# Patient Record
Sex: FEMALE | NOT HISPANIC OR LATINO | Employment: OTHER | URBAN - METROPOLITAN AREA
[De-identification: names, ages, dates, MRNs, and addresses within clinical notes are randomized per-mention and may not be internally consistent; named-entity substitution may affect disease eponyms.]

---

## 2023-09-01 PROBLEM — I10 ESSENTIAL HYPERTENSION: Status: ACTIVE | Noted: 2023-09-01

## 2023-09-01 PROBLEM — R60.0 EDEMA OF LEFT LOWER EXTREMITY: Status: ACTIVE | Noted: 2023-09-01

## 2023-09-01 PROBLEM — D72.829 LEUKOCYTOSIS: Status: ACTIVE | Noted: 2023-09-01

## 2023-09-01 PROBLEM — E78.5 HYPERLIPIDEMIA: Status: ACTIVE | Noted: 2023-09-01

## 2023-09-01 PROBLEM — E11.9 TYPE 2 DIABETES MELLITUS WITHOUT COMPLICATIONS (MULTI): Status: ACTIVE | Noted: 2023-09-01

## 2023-09-01 PROBLEM — C85.90 LYMPHOMA (MULTI): Status: ACTIVE | Noted: 2023-09-01

## 2023-09-01 RX ORDER — DOXYCYCLINE 100 MG/1
CAPSULE ORAL
COMMUNITY
Start: 2017-06-04 | End: 2023-11-30 | Stop reason: ALTCHOICE

## 2023-09-01 RX ORDER — METFORMIN HYDROCHLORIDE 500 MG/1
1 TABLET ORAL 2 TIMES DAILY
COMMUNITY
Start: 2017-09-19 | End: 2023-11-30 | Stop reason: DRUGHIGH

## 2023-09-01 RX ORDER — SULFAMETHOXAZOLE AND TRIMETHOPRIM 800; 160 MG/1; MG/1
1 TABLET ORAL 2 TIMES DAILY
COMMUNITY
Start: 2017-06-06 | End: 2023-10-20 | Stop reason: ALTCHOICE

## 2023-09-01 RX ORDER — CHOLECALCIFEROL (VITAMIN D3) 25 MCG
1 TABLET ORAL
COMMUNITY

## 2023-09-01 RX ORDER — LOSARTAN POTASSIUM 25 MG/1
1 TABLET ORAL DAILY
COMMUNITY
End: 2023-10-20

## 2023-09-01 RX ORDER — HYDROCORTISONE 1 %
CREAM (GRAM) TOPICAL
COMMUNITY
Start: 2017-06-06 | End: 2023-11-30 | Stop reason: WASHOUT

## 2023-09-01 RX ORDER — METFORMIN HYDROCHLORIDE 500 MG/1
TABLET ORAL NIGHTLY
COMMUNITY
End: 2023-11-30 | Stop reason: DRUGHIGH

## 2023-09-01 RX ORDER — VIT C/E/ZN/COPPR/LUTEIN/ZEAXAN 250MG-90MG
1 CAPSULE ORAL DAILY
COMMUNITY
End: 2023-11-30 | Stop reason: ALTCHOICE

## 2023-09-01 RX ORDER — AMLODIPINE BESYLATE 10 MG/1
1 TABLET ORAL DAILY
COMMUNITY
End: 2023-10-05

## 2023-09-01 RX ORDER — FLUTICASONE PROPIONATE 50 MCG
1 SPRAY, SUSPENSION (ML) NASAL DAILY
COMMUNITY
Start: 2023-05-30 | End: 2023-11-30 | Stop reason: ALTCHOICE

## 2023-09-01 RX ORDER — METFORMIN HYDROCHLORIDE 500 MG/1
3 TABLET ORAL DAILY
COMMUNITY
End: 2023-11-30 | Stop reason: DRUGHIGH

## 2023-09-01 RX ORDER — ASPIRIN 81 MG/1
1 TABLET ORAL DAILY
COMMUNITY
Start: 2016-11-09

## 2023-09-01 RX ORDER — ATENOLOL 50 MG/1
1 TABLET ORAL DAILY
COMMUNITY
End: 2023-10-05

## 2023-09-01 RX ORDER — GUAIFENESIN 1200 MG
TABLET, EXTENDED RELEASE 12 HR ORAL AS NEEDED
COMMUNITY
Start: 2016-11-09

## 2023-10-20 DIAGNOSIS — I10 PRIMARY HYPERTENSION: ICD-10-CM

## 2023-10-20 RX ORDER — LOSARTAN POTASSIUM 25 MG/1
25 TABLET ORAL DAILY
Qty: 90 TABLET | Refills: 3 | Status: SHIPPED | OUTPATIENT
Start: 2023-10-20 | End: 2024-02-19 | Stop reason: SDUPTHER

## 2023-11-21 ENCOUNTER — OFFICE VISIT (OUTPATIENT)
Dept: HEMATOLOGY/ONCOLOGY | Facility: CLINIC | Age: 88
End: 2023-11-21
Payer: MEDICARE

## 2023-11-21 VITALS
SYSTOLIC BLOOD PRESSURE: 163 MMHG | HEIGHT: 60 IN | DIASTOLIC BLOOD PRESSURE: 82 MMHG | OXYGEN SATURATION: 93 % | RESPIRATION RATE: 16 BRPM | HEART RATE: 67 BPM | TEMPERATURE: 97 F | WEIGHT: 159.39 LBS | BODY MASS INDEX: 31.29 KG/M2

## 2023-11-21 DIAGNOSIS — C83.30 DLBCL (DIFFUSE LARGE B CELL LYMPHOMA) (MULTI): ICD-10-CM

## 2023-11-21 LAB
BASOPHILS # BLD AUTO: 0.07 X10*3/UL (ref 0–0.1)
BASOPHILS NFR BLD AUTO: 0.6 %
EOSINOPHIL # BLD AUTO: 0.14 X10*3/UL (ref 0–0.4)
EOSINOPHIL NFR BLD AUTO: 1.3 %
ERYTHROCYTE [DISTWIDTH] IN BLOOD BY AUTOMATED COUNT: 13 % (ref 11.5–14.5)
HCT VFR BLD AUTO: 40.8 % (ref 36–46)
HGB BLD-MCNC: 13.3 G/DL (ref 12–16)
IMM GRANULOCYTES # BLD AUTO: 0.03 X10*3/UL (ref 0–0.5)
IMM GRANULOCYTES NFR BLD AUTO: 0.3 % (ref 0–0.9)
LYMPHOCYTES # BLD AUTO: 2.55 X10*3/UL (ref 0.8–3)
LYMPHOCYTES NFR BLD AUTO: 23.3 %
MCH RBC QN AUTO: 32.2 PG (ref 26–34)
MCHC RBC AUTO-ENTMCNC: 32.6 G/DL (ref 32–36)
MCV RBC AUTO: 99 FL (ref 80–100)
MONOCYTES # BLD AUTO: 0.97 X10*3/UL (ref 0.05–0.8)
MONOCYTES NFR BLD AUTO: 8.9 %
NEUTROPHILS # BLD AUTO: 7.18 X10*3/UL (ref 1.6–5.5)
NEUTROPHILS NFR BLD AUTO: 65.6 %
PLATELET # BLD AUTO: 424 X10*3/UL (ref 150–450)
RBC # BLD AUTO: 4.13 X10*6/UL (ref 4–5.2)
WBC # BLD AUTO: 10.9 X10*3/UL (ref 4.4–11.3)

## 2023-11-21 PROCEDURE — 1126F AMNT PAIN NOTED NONE PRSNT: CPT | Performed by: INTERNAL MEDICINE

## 2023-11-21 PROCEDURE — 85025 COMPLETE CBC W/AUTO DIFF WBC: CPT | Performed by: INTERNAL MEDICINE

## 2023-11-21 PROCEDURE — 1159F MED LIST DOCD IN RCRD: CPT | Performed by: INTERNAL MEDICINE

## 2023-11-21 PROCEDURE — 99213 OFFICE O/P EST LOW 20 MIN: CPT | Performed by: INTERNAL MEDICINE

## 2023-11-21 PROCEDURE — 3079F DIAST BP 80-89 MM HG: CPT | Performed by: INTERNAL MEDICINE

## 2023-11-21 PROCEDURE — 36415 COLL VENOUS BLD VENIPUNCTURE: CPT | Performed by: INTERNAL MEDICINE

## 2023-11-21 PROCEDURE — 3077F SYST BP >= 140 MM HG: CPT | Performed by: INTERNAL MEDICINE

## 2023-11-21 ASSESSMENT — PATIENT HEALTH QUESTIONNAIRE - PHQ9
1. LITTLE INTEREST OR PLEASURE IN DOING THINGS: NOT AT ALL
8. MOVING OR SPEAKING SO SLOWLY THAT OTHER PEOPLE COULD HAVE NOTICED. OR THE OPPOSITE, BEING SO FIGETY OR RESTLESS THAT YOU HAVE BEEN MOVING AROUND A LOT MORE THAN USUAL: NOT AT ALL
3. TROUBLE FALLING OR STAYING ASLEEP OR SLEEPING TOO MUCH: NOT AT ALL
3. TROUBLE FALLING OR STAYING ASLEEP OR SLEEPING TOO MUCH: NOT AT ALL
2. FEELING DOWN, DEPRESSED OR HOPELESS: NOT AT ALL
7. TROUBLE CONCENTRATING ON THINGS, SUCH AS READING THE NEWSPAPER OR WATCHING TELEVISION: NOT AT ALL
4. FEELING TIRED OR HAVING LITTLE ENERGY: NOT AT ALL
2. FEELING DOWN, DEPRESSED, IRRITABLE, OR HOPELESS: 0
SUM OF ALL RESPONSES TO PHQ QUESTIONS 1-9: 0
4. FEELING TIRED OR HAVING LITTLE ENERGY: 0
3. TROUBLE FALLING OR STAYING ASLEEP OR SLEEPING TOO MUCH: 0
7. TROUBLE CONCENTRATING ON THINGS, SUCH AS READING THE NEWSPAPER OR WATCHING TELEVISION: NOT AT ALL
10. IF YOU CHECKED OFF ANY PROBLEMS, HOW DIFFICULT HAVE THESE PROBLEMS MADE IT FOR YOU TO DO YOUR WORK, TAKE CARE OF THINGS AT HOME, OR GET ALONG WITH OTHER PEOPLE: NOT DIFFICULT AT ALL
1. LITTLE INTEREST OR PLEASURE IN DOING THINGS: NOT AT ALL
8. MOVING OR SPEAKING SO SLOWLY THAT OTHER PEOPLE COULD HAVE NOTICED. OR THE OPPOSITE, BEING SO FIGETY OR RESTLESS THAT YOU HAVE BEEN MOVING AROUND A LOT MORE THAN USUAL: NOT AT ALL
6. FEELING BAD ABOUT YOURSELF - OR THAT YOU ARE A FAILURE OR HAVE LET YOURSELF OR YOUR FAMILY DOWN: NOT AT ALL
9. THOUGHTS THAT YOU WOULD BE BETTER OFF DEAD, OR OF HURTING YOURSELF: 0
2. FEELING DOWN, DEPRESSED, IRRITABLE, OR HOPELESS: NOT AT ALL
6. FEELING BAD ABOUT YOURSELF - OR THAT YOU ARE A FAILURE OR HAVE LET YOURSELF OR YOUR FAMILY DOWN: NOT AT ALL
5. POOR APPETITE OR OVEREATING: NOT AT ALL
10. IF YOU CHECKED OFF ANY PROBLEMS, HOW DIFFICULT HAVE THESE PROBLEMS MADE IT FOR YOU TO DO YOUR WORK, TAKE CARE OF THINGS AT HOME, OR GET ALONG WITH OTHER PEOPLE: NOT DIFFICULT AT ALL
9. THOUGHTS THAT YOU WOULD BE BETTER OFF DEAD, OR OF HURTING YOURSELF: NOT AT ALL
1. LITTLE INTEREST OR PLEASURE IN DOING THINGS: 0
5. POOR APPETITE OR OVEREATING: NOT AT ALL
6. FEELING BAD ABOUT YOURSELF - OR THAT YOU ARE A FAILURE OR HAVE LET YOURSELF OR YOUR FAMILY DOWN: 0
4. FEELING TIRED OR HAVING LITTLE ENERGY: NOT AT ALL
SUM OF ALL RESPONSES TO PHQ QUESTIONS 1-9: 0
8. MOVING OR SPEAKING SO SLOWLY THAT OTHER PEOPLE COULD HAVE NOTICED. OR THE OPPOSITE, BEING SO FIGETY OR RESTLESS THAT YOU HAVE BEEN MOVING AROUND A LOT MORE THAN USUAL: 0
5. POOR APPETITE OR OVEREATING: 0
7. TROUBLE CONCENTRATING ON THINGS, SUCH AS READING THE NEWSPAPER OR WATCHING TELEVISION: 0
9. THOUGHTS THAT YOU WOULD BE BETTER OFF DEAD, OR OF HURTING YOURSELF: NOT AT ALL

## 2023-11-21 ASSESSMENT — PAIN SCALES - GENERAL: PAINLEVEL: 0-NO PAIN

## 2023-11-21 ASSESSMENT — ENCOUNTER SYMPTOMS
OCCASIONAL FEELINGS OF UNSTEADINESS: 0
DEPRESSION: 0
LOSS OF SENSATION IN FEET: 0

## 2023-11-21 NOTE — PATIENT INSTRUCTIONS
Today you met with Dr. Means and he has ordered a CT with a follow up appt afterward.  And...if all is good, you will be able to see your PCP going forward.

## 2023-11-21 NOTE — PROGRESS NOTES
"Patient ID: Soni Herrera is a 88 y.o. female.  Referring Physician: No referring provider defined for this encounter.  Primary Care Provider: MEGHANN Alcocer      Subjective    HPI  Treatment Synopsis:    s/p mini-R-CHOP x 6 and s/p splenectomy Sep 2014 for DLBCL      History of Present Illness:      Interval History:    Patient presents for follow up. On assessment she is doing well. Appetite and energy are good and she remains active. Denies fever, chills, night sweats, anorexia,  weight loss, CP, SOB, abd pain, N/V/D/C, bleeding, and any other complaints at this time.  Review of Systems   Constitutional: Negative.    HENT:  Negative.     Eyes: Negative.    Respiratory: Negative.     Cardiovascular: Negative.    Gastrointestinal: Negative.    Genitourinary: Negative.          Objective   BSA: 1.75 meters squared  /82 (BP Location: Left arm, Patient Position: Sitting, BP Cuff Size: Adult)   Pulse 67   Temp 36.1 °C (97 °F) (Temporal)   Resp 16   Ht 1.53 m (5' 0.24\")   Wt 72.3 kg (159 lb 6.3 oz)   SpO2 93%   BMI 30.89 kg/m²     Family History   Problem Relation Name Age of Onset    Heart disease Mother      Hypertension Mother      Prostate cancer Son          last june     Oncology History    No history exists.       Soni Herrera  has no history on file for tobacco use.  She  has no history on file for alcohol use.  She  has no history on file for drug use.    Physical Exam  Constitutional:       Appearance: Normal appearance.   HENT:      Head: Normocephalic and atraumatic.      Right Ear: External ear normal.      Left Ear: External ear normal.      Nose: Nose normal.      Mouth/Throat:      Mouth: Mucous membranes are moist.   Eyes:      Extraocular Movements: Extraocular movements intact.      Pupils: Pupils are equal, round, and reactive to light.   Pulmonary:      Effort: Pulmonary effort is normal.      Breath sounds: Normal breath sounds.   Abdominal:      General: Abdomen is flat.     "  Palpations: Abdomen is soft.   Musculoskeletal:         General: Normal range of motion.      Cervical back: Normal range of motion and neck supple.   Neurological:      General: No focal deficit present.      Mental Status: She is oriented to person, place, and time.         Performance Status:  Symptomatic; fully ambulatory    Assessment/Plan    CT CAP : RTC 8 weeks.  Patient with a history of lymphoma treated with Rituxan CHOP x 6 and splenectomy in 2014.  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  CT chest abdomen and pelvis prescribed.  If negative patient will be discharged from oncologic surveillance.    Diagnoses and all orders for this visit:  DLBCL (diffuse large B cell lymphoma) (CMS/HCC)  -     CBC and Auto Differential; Future  -     CT chest abdomen pelvis w IV contrast; Future  -     Clinic Appointment Request Follow Up; Future             Keith-Ez Means MD

## 2023-11-22 ASSESSMENT — ENCOUNTER SYMPTOMS
CARDIOVASCULAR NEGATIVE: 1
GASTROINTESTINAL NEGATIVE: 1
RESPIRATORY NEGATIVE: 1
EYES NEGATIVE: 1
CONSTITUTIONAL NEGATIVE: 1

## 2023-11-30 ENCOUNTER — OFFICE VISIT (OUTPATIENT)
Dept: PRIMARY CARE | Facility: CLINIC | Age: 88
End: 2023-11-30
Payer: MEDICARE

## 2023-11-30 VITALS
OXYGEN SATURATION: 93 % | WEIGHT: 160 LBS | DIASTOLIC BLOOD PRESSURE: 68 MMHG | HEIGHT: 62 IN | TEMPERATURE: 96.7 F | HEART RATE: 68 BPM | BODY MASS INDEX: 29.44 KG/M2 | SYSTOLIC BLOOD PRESSURE: 136 MMHG

## 2023-11-30 DIAGNOSIS — I10 ESSENTIAL HYPERTENSION: ICD-10-CM

## 2023-11-30 DIAGNOSIS — E78.2 MIXED HYPERLIPIDEMIA: ICD-10-CM

## 2023-11-30 DIAGNOSIS — N18.31 STAGE 3A CHRONIC KIDNEY DISEASE (MULTI): Primary | ICD-10-CM

## 2023-11-30 DIAGNOSIS — E11.9 TYPE 2 DIABETES MELLITUS WITHOUT COMPLICATION, WITHOUT LONG-TERM CURRENT USE OF INSULIN (MULTI): ICD-10-CM

## 2023-11-30 PROBLEM — R60.0 EDEMA OF LEFT LOWER EXTREMITY: Status: RESOLVED | Noted: 2023-09-01 | Resolved: 2023-11-30

## 2023-11-30 LAB — POC HEMOGLOBIN A1C: 7.2 % (ref 4.2–6.5)

## 2023-11-30 PROCEDURE — 83036 HEMOGLOBIN GLYCOSYLATED A1C: CPT | Performed by: NURSE PRACTITIONER

## 2023-11-30 PROCEDURE — 3078F DIAST BP <80 MM HG: CPT | Performed by: NURSE PRACTITIONER

## 2023-11-30 PROCEDURE — 1159F MED LIST DOCD IN RCRD: CPT | Performed by: NURSE PRACTITIONER

## 2023-11-30 PROCEDURE — 1036F TOBACCO NON-USER: CPT | Performed by: NURSE PRACTITIONER

## 2023-11-30 PROCEDURE — 1160F RVW MEDS BY RX/DR IN RCRD: CPT | Performed by: NURSE PRACTITIONER

## 2023-11-30 PROCEDURE — 1126F AMNT PAIN NOTED NONE PRSNT: CPT | Performed by: NURSE PRACTITIONER

## 2023-11-30 PROCEDURE — 99214 OFFICE O/P EST MOD 30 MIN: CPT | Performed by: NURSE PRACTITIONER

## 2023-11-30 PROCEDURE — 3075F SYST BP GE 130 - 139MM HG: CPT | Performed by: NURSE PRACTITIONER

## 2023-11-30 RX ORDER — METFORMIN HYDROCHLORIDE 500 MG/1
1000 TABLET ORAL
COMMUNITY

## 2023-11-30 ASSESSMENT — ENCOUNTER SYMPTOMS
ABDOMINAL PAIN: 0
SHORTNESS OF BREATH: 0
PHOTOPHOBIA: 0
HEMATURIA: 0
BACK PAIN: 0
BLOOD IN STOOL: 0
BRUISES/BLEEDS EASILY: 0
TREMORS: 0
ACTIVITY CHANGE: 0
PALPITATIONS: 0
COUGH: 0
SINUS PAIN: 0
ARTHRALGIAS: 0
NERVOUS/ANXIOUS: 0
CONSTIPATION: 0
ADENOPATHY: 0
DEPRESSION: 0
HEADACHES: 0
WHEEZING: 0
AGITATION: 0
WEAKNESS: 0
SORE THROAT: 0
DIZZINESS: 0
LOSS OF SENSATION IN FEET: 0
EYE DISCHARGE: 0
OCCASIONAL FEELINGS OF UNSTEADINESS: 0
JOINT SWELLING: 0
APPETITE CHANGE: 0
DYSURIA: 0
DIARRHEA: 0

## 2023-11-30 ASSESSMENT — PATIENT HEALTH QUESTIONNAIRE - PHQ9
SUM OF ALL RESPONSES TO PHQ9 QUESTIONS 1 AND 2: 0
1. LITTLE INTEREST OR PLEASURE IN DOING THINGS: NOT AT ALL
2. FEELING DOWN, DEPRESSED OR HOPELESS: NOT AT ALL

## 2023-11-30 ASSESSMENT — PAIN SCALES - GENERAL: PAINLEVEL: 0-NO PAIN

## 2023-11-30 NOTE — PROGRESS NOTES
Subjective   Patient ID: Soni Herrera is a 88 y.o. female who presents for Diabetes.    Presents today for follow-up on diabetes and hypertension.  She denies new concerns.  She brings with her today her daughter-in-law who helps with history and recent events.  She notes that her  passed away in August of this year.  He was sick for approximately 2 weeks before he passed away.  She states that she is living independently in her home.  Her children do check on her intermittently.  She is able to manage her meals and her ADLs and IADLs.  Hemoglobin A1c in office today is 7.2 which is improved from 7.4 in July.  She denies any periods of low blood sugars.    Diabetes  She presents for her follow-up diabetic visit. She has type 2 diabetes mellitus. Pertinent negatives for hypoglycemia include no dizziness, headaches, nervousness/anxiousness or tremors. Pertinent negatives for diabetes include no chest pain, no polyuria and no weakness. There are no hypoglycemic complications. Risk factors for coronary artery disease include dyslipidemia, diabetes mellitus, sedentary lifestyle and family history. Current diabetic treatment includes oral agent (monotherapy). She is compliant with treatment all of the time. She is following a diabetic and generally healthy diet. Meal planning includes avoidance of concentrated sweets. She rarely participates in exercise. There is no change in her home blood glucose trend. An ACE inhibitor/angiotensin II receptor blocker is being taken. She sees a podiatrist.       Review of Systems   Constitutional:  Negative for activity change and appetite change.   HENT:  Negative for congestion, ear pain, hearing loss, sinus pain and sore throat.    Eyes:  Negative for photophobia, discharge and visual disturbance.   Respiratory:  Negative for cough, shortness of breath and wheezing.    Cardiovascular:  Negative for chest pain, palpitations and leg swelling.   Gastrointestinal:  Negative for  "abdominal pain, blood in stool, constipation and diarrhea.   Endocrine: Negative for cold intolerance, heat intolerance and polyuria.   Genitourinary:  Negative for dysuria and hematuria.   Musculoskeletal:  Negative for arthralgias, back pain and joint swelling.   Skin:  Negative for rash.   Allergic/Immunologic: Negative for environmental allergies and food allergies.   Neurological:  Negative for dizziness, tremors, syncope, weakness and headaches.   Hematological:  Negative for adenopathy. Does not bruise/bleed easily.   Psychiatric/Behavioral:  Negative for agitation and suicidal ideas. The patient is not nervous/anxious.        Objective   /68   Pulse 68   Temp 35.9 °C (96.7 °F) (Temporal)   Ht 1.575 m (5' 2\")   Wt 72.6 kg (160 lb)   SpO2 93%   BMI 29.26 kg/m²     Physical Exam  Constitutional:       General: She is not in acute distress.     Appearance: Normal appearance.   HENT:      Head: Normocephalic.      Right Ear: Tympanic membrane normal.      Left Ear: Tympanic membrane normal.      Nose: Nose normal.      Mouth/Throat:      Mouth: Mucous membranes are moist.   Eyes:      Conjunctiva/sclera: Conjunctivae normal.      Pupils: Pupils are equal, round, and reactive to light.   Cardiovascular:      Rate and Rhythm: Normal rate and regular rhythm.      Pulses:           Dorsalis pedis pulses are 1+ on the right side and 1+ on the left side.      Heart sounds: Normal heart sounds.   Pulmonary:      Effort: Pulmonary effort is normal.      Breath sounds: Normal breath sounds.   Abdominal:      General: Bowel sounds are normal.      Palpations: Abdomen is soft.   Musculoskeletal:         General: Normal range of motion.   Feet:      Right foot:      Protective Sensation: 2 sites tested.  2 sites sensed.      Skin integrity: Skin integrity normal.      Left foot:      Protective Sensation: 2 sites tested.  2 sites sensed.      Skin integrity: Skin integrity normal.   Skin:     General: Skin is " warm and dry.      Capillary Refill: Capillary refill takes less than 2 seconds.   Neurological:      Mental Status: She is alert and oriented to person, place, and time.   Psychiatric:         Mood and Affect: Mood normal.         Speech: Speech normal.         Assessment/Plan   Problem List Items Addressed This Visit             ICD-10-CM    Type 2 diabetes mellitus without complications (CMS/HCC) E11.9    Essential hypertension I10    Hyperlipidemia E78.5    Stage 3a chronic kidney disease (CMS/[facility]) - Primary N18.31     Focus on healthy eating including lean proteins and vegetables.  Avoid high carbohydrate high sugar foods and drinks.  Try to increase physical activity as tolerated.  Goal is for 160 minutes/week of physical activity.  Check blood pressure 2-3 times a week.  Call for blood pressures greater than 140/90.  Bring list of blood pressures to next visit.  Hemoglobin A1C is 7.2 today improved from 7.3 in May.

## 2023-11-30 NOTE — PATIENT INSTRUCTIONS
Focus on healthy eating including lean proteins and vegetables.  Avoid high carbohydrate high sugar foods and drinks.  Try to increase physical activity as tolerated.  Goal is for 160 minutes/week of physical activity.  Check blood pressure 2-3 times a week.  Call for blood pressures greater than 140/90.  Bring list of blood pressures to next visit.  Hemoglobin A1C is 7.2 today improved from 7.3 in May.

## 2024-01-16 ENCOUNTER — APPOINTMENT (OUTPATIENT)
Dept: RADIOLOGY | Facility: HOSPITAL | Age: 89
End: 2024-01-16
Payer: MEDICARE

## 2024-01-22 ENCOUNTER — APPOINTMENT (OUTPATIENT)
Dept: HEMATOLOGY/ONCOLOGY | Facility: CLINIC | Age: 89
End: 2024-01-22
Payer: MEDICARE

## 2024-01-23 ENCOUNTER — APPOINTMENT (OUTPATIENT)
Dept: HEMATOLOGY/ONCOLOGY | Facility: CLINIC | Age: 89
End: 2024-01-23
Payer: MEDICARE

## 2024-01-23 DIAGNOSIS — I10 ESSENTIAL HYPERTENSION: ICD-10-CM

## 2024-01-23 RX ORDER — ATENOLOL 50 MG/1
50 TABLET ORAL DAILY
Qty: 90 TABLET | Refills: 3 | Status: SHIPPED | OUTPATIENT
Start: 2024-01-23 | End: 2024-01-24 | Stop reason: SDUPTHER

## 2024-01-24 DIAGNOSIS — I10 ESSENTIAL HYPERTENSION: ICD-10-CM

## 2024-01-24 RX ORDER — ATENOLOL 50 MG/1
50 TABLET ORAL DAILY
Qty: 90 TABLET | Refills: 3 | Status: SHIPPED | OUTPATIENT
Start: 2024-01-24 | End: 2025-01-23

## 2024-02-13 ENCOUNTER — HOSPITAL ENCOUNTER (OUTPATIENT)
Dept: RADIOLOGY | Facility: HOSPITAL | Age: 89
Discharge: HOME | End: 2024-02-13
Payer: MEDICARE

## 2024-02-13 DIAGNOSIS — C83.30 DLBCL (DIFFUSE LARGE B CELL LYMPHOMA) (MULTI): ICD-10-CM

## 2024-02-13 LAB
CREAT SERPL-MCNC: 0.8 MG/DL (ref 0.6–1.3)
GFR SERPL CREATININE-BSD FRML MDRD: 71 ML/MIN/1.73M*2

## 2024-02-13 PROCEDURE — 71260 CT THORAX DX C+: CPT | Performed by: RADIOLOGY

## 2024-02-13 PROCEDURE — 2550000001 HC RX 255 CONTRASTS: Performed by: INTERNAL MEDICINE

## 2024-02-13 PROCEDURE — 82565 ASSAY OF CREATININE: CPT

## 2024-02-13 PROCEDURE — 74177 CT ABD & PELVIS W/CONTRAST: CPT | Performed by: RADIOLOGY

## 2024-02-13 PROCEDURE — 74177 CT ABD & PELVIS W/CONTRAST: CPT

## 2024-02-13 RX ADMIN — IOHEXOL 75 ML: 350 INJECTION, SOLUTION INTRAVENOUS at 12:02

## 2024-02-16 ENCOUNTER — TELEPHONE (OUTPATIENT)
Dept: HEMATOLOGY/ONCOLOGY | Facility: CLINIC | Age: 89
End: 2024-02-16
Payer: MEDICARE

## 2024-02-16 ENCOUNTER — OFFICE VISIT (OUTPATIENT)
Dept: HEMATOLOGY/ONCOLOGY | Facility: CLINIC | Age: 89
End: 2024-02-16
Payer: MEDICARE

## 2024-02-16 VITALS
HEART RATE: 76 BPM | DIASTOLIC BLOOD PRESSURE: 69 MMHG | OXYGEN SATURATION: 96 % | WEIGHT: 160.6 LBS | BODY MASS INDEX: 29.38 KG/M2 | RESPIRATION RATE: 17 BRPM | TEMPERATURE: 96.3 F | SYSTOLIC BLOOD PRESSURE: 147 MMHG

## 2024-02-16 DIAGNOSIS — C83.30 DLBCL (DIFFUSE LARGE B CELL LYMPHOMA) (MULTI): ICD-10-CM

## 2024-02-16 DIAGNOSIS — I10 ESSENTIAL HYPERTENSION: ICD-10-CM

## 2024-02-16 PROCEDURE — 1159F MED LIST DOCD IN RCRD: CPT | Performed by: INTERNAL MEDICINE

## 2024-02-16 PROCEDURE — 99214 OFFICE O/P EST MOD 30 MIN: CPT | Performed by: INTERNAL MEDICINE

## 2024-02-16 PROCEDURE — 1036F TOBACCO NON-USER: CPT | Performed by: INTERNAL MEDICINE

## 2024-02-16 PROCEDURE — 3078F DIAST BP <80 MM HG: CPT | Performed by: INTERNAL MEDICINE

## 2024-02-16 PROCEDURE — 3077F SYST BP >= 140 MM HG: CPT | Performed by: INTERNAL MEDICINE

## 2024-02-16 PROCEDURE — 1126F AMNT PAIN NOTED NONE PRSNT: CPT | Performed by: INTERNAL MEDICINE

## 2024-02-16 PROCEDURE — 1157F ADVNC CARE PLAN IN RCRD: CPT | Performed by: INTERNAL MEDICINE

## 2024-02-16 RX ORDER — AMLODIPINE BESYLATE 10 MG/1
10 TABLET ORAL DAILY
Qty: 90 TABLET | Refills: 3 | Status: SHIPPED | OUTPATIENT
Start: 2024-02-16 | End: 2024-02-19 | Stop reason: SDUPTHER

## 2024-02-16 ASSESSMENT — PAIN SCALES - GENERAL: PAINLEVEL: 0-NO PAIN

## 2024-02-16 NOTE — PROGRESS NOTES
Patient ID: Soni Herrera is a 88 y.o. female.  Referring Physician: Josie Means MD  [address] [place] [address]  Primary Care Provider: MEGHANN Alcocer  Visit Type:  Follow Up       Subjective    HPI  Treatment Synopsis:    s/p mini-R-CHOP x 6 and s/p splenectomy Sep 2014 for DLBCL      History of Present Illness:      Interval History:    Patient presents for follow up. On assessment she is doing well. Appetite and energy are good and she remains active. Denies fever, chills, night sweats, anorexia,  weight loss, CP, SOB, abd pain, N/V/D/C, bleeding, and any other complaints at this time.  Review of Systems - Oncology     Objective   BSA: 1.78 meters squared  /69 (BP Location: Left arm, Patient Position: Sitting, BP Cuff Size: Adult)   Pulse 76   Temp 35.7 °C (96.3 °F) (Temporal)   Resp 17   Wt 72.8 kg (160 lb 9.7 oz)   SpO2 96%   BMI 29.38 kg/m²      has a past medical history of Left upper quadrant pain (04/03/2014), Pallor (04/03/2014), Personal history of other specified conditions (03/13/2014), and Personal history of other specified conditions (04/03/2014).   has a past surgical history that includes Total abdominal hysterectomy w/ bilateral salpingoophorectomy (03/13/2014) and Tonsillectomy (03/13/2014).  Family History   Problem Relation Name Age of Onset    Heart disease Mother      Hypertension Mother      Prostate cancer Son          last june     Oncology History    No history exists.       Soni Herrera  reports that she has never smoked. She has never used smokeless tobacco.  She  reports no history of alcohol use.  She  reports no history of drug use.    Physical Exam    WBC   Date/Time Value Ref Range Status   11/21/2023 11:07 AM 10.9 4.4 - 11.3 x10*3/uL Final   05/30/2023 01:50 PM 12.4 (H) 4.5 - 11.0 K/UL Final   11/22/2022 10:49 AM 12.4 (H) 4.4 - 11.3 x10E9/L Final   11/23/2021 01:49 PM 10.9 4.4 - 11.3 x10E9/L Final     nRBC   Date Value  Ref Range Status   05/30/2023 0 0 /100 WBC Final   05/11/2021 0 0 /100 WBC Final   11/16/2020 0 0 /100 WBC Final     RBC   Date Value Ref Range Status   11/21/2023 4.13 4.00 - 5.20 x10*6/uL Final   05/30/2023 4.06 4.0 - 4.9 M/UL Final   11/22/2022 3.95 (L) 4.00 - 5.20 x10E12/L Final   11/23/2021 3.91 (L) 4.00 - 5.20 x10E12/L Final     Hemoglobin   Date Value Ref Range Status   11/21/2023 13.3 12.0 - 16.0 g/dL Final   05/30/2023 12.5 12.0 - 15.0 GM/DL Final   11/22/2022 12.8 12.0 - 16.0 g/dL Final   11/23/2021 12.6 12.0 - 16.0 g/dL Final     Hematocrit   Date Value Ref Range Status   11/21/2023 40.8 36.0 - 46.0 % Final   05/30/2023 38.6 36 - 44 % Final   11/22/2022 40.0 36.0 - 46.0 % Final   11/23/2021 38.4 36.0 - 46.0 % Final     MCV   Date/Time Value Ref Range Status   11/21/2023 11:07 AM 99 80 - 100 fL Final   05/30/2023 01:50 PM 95.1 80 - 100 FL Final   11/22/2022 10:49  (H) 80 - 100 fL Final   11/23/2021 01:49 PM 98 80 - 100 fL Final     MCH   Date/Time Value Ref Range Status   11/21/2023 11:07 AM 32.2 26.0 - 34.0 pg Final   05/30/2023 01:50 PM 30.8 26 - 34 PG Final   05/11/2021 03:30 PM 31.6 26 - 34 PG Final     MCHC   Date/Time Value Ref Range Status   11/21/2023 11:07 AM 32.6 32.0 - 36.0 g/dL Final   05/30/2023 01:50 PM 32.4 31 - 37 % Final   11/22/2022 10:49 AM 32.0 32.0 - 36.0 g/dL Final   11/23/2021 01:49 PM 32.8 32.0 - 36.0 g/dL Final     RDW   Date/Time Value Ref Range Status   11/21/2023 11:07 AM 13.0 11.5 - 14.5 % Final   11/22/2022 10:49 AM 13.2 11.5 - 14.5 % Final   11/23/2021 01:49 PM 13.3 11.5 - 14.5 % Final     Platelets   Date/Time Value Ref Range Status   11/21/2023 11:07  150 - 450 x10*3/uL Final   05/30/2023 01:50  (H) 150 - 450 K/UL Final   11/22/2022 10:49  150 - 450 x10E9/L Final   11/23/2021 01:49  150 - 450 x10E9/L Final     MPV   Date/Time Value Ref Range Status   05/30/2023 01:50 PM 10.0 7.0 - 12.6 CU Final   05/11/2021 03:30 PM 10.0 7.0 - 12.6 CU Final    11/16/2020 09:50 AM 10.4 7.0 - 12.6 CU Final     Neutrophils %   Date/Time Value Ref Range Status   11/21/2023 11:07 AM 65.6 40.0 - 80.0 % Final   11/22/2022 10:49 AM 64.4 40.0 - 80.0 % Final   11/23/2021 01:49 PM 55.3 40.0 - 80.0 % Final     Immature Granulocytes %, Automated   Date/Time Value Ref Range Status   11/21/2023 11:07 AM 0.3 0.0 - 0.9 % Final     Comment:     Immature Granulocyte Count (IG) includes promyelocytes, myelocytes and metamyelocytes but does not include bands. Percent differential counts (%) should be interpreted in the context of the absolute cell counts (cells/UL).   11/22/2022 10:49 AM 0.2 0.0 - 0.9 % Final     Comment:      Immature Granulocyte Count (IG) includes promyelocytes,    myelocytes and metamyelocytes but does not include bands.   Percent differential counts (%) should be interpreted in the   context of the absolute cell counts (cells/L).     11/23/2021 01:49 PM 0.2 0.0 - 0.9 % Final     Comment:      Immature Granulocyte Count (IG) includes promyelocytes,    myelocytes and metamyelocytes but does not include bands.   Percent differential counts (%) should be interpreted in the   context of the absolute cell counts (cells/L).       Lymphocytes %   Date/Time Value Ref Range Status   11/21/2023 11:07 AM 23.3 13.0 - 44.0 % Final   05/30/2023 01:50 PM 20.10 20 - 40 % Final   11/22/2022 10:49 AM 23.9 13.0 - 44.0 % Final     Comment:      Percent differential counts (%) should be interpreted in the   context of the absolute cell counts (cells/L).     11/23/2021 01:49 PM 31.8 13.0 - 44.0 % Final     Comment:      Percent differential counts (%) should be interpreted in the   context of the absolute cell counts (cells/L).     05/11/2021 03:30 PM 19.30 (L) 20 - 40 % Final   11/16/2020 09:50 AM 21.80 20 - 40 % Final     Monocytes %   Date/Time Value Ref Range Status   11/21/2023 11:07 AM 8.9 2.0 - 10.0 % Final   05/30/2023 01:50 PM 7.70 0 - 8 % Final   11/22/2022 10:49 AM 8.7 2.0 - 10.0  % Final   11/23/2021 01:49 PM 10.5 2.0 - 10.0 % Final   05/11/2021 03:30 PM 8.00 0 - 8 % Final   11/16/2020 09:50 AM 8.20 (H) 0 - 8 % Final     Eosinophils %   Date/Time Value Ref Range Status   11/21/2023 11:07 AM 1.3 0.0 - 6.0 % Final   11/22/2022 10:49 AM 2.2 0.0 - 6.0 % Final   11/23/2021 01:49 PM 1.6 0.0 - 6.0 % Final     Basophils %   Date/Time Value Ref Range Status   11/21/2023 11:07 AM 0.6 0.0 - 2.0 % Final   05/30/2023 01:50 PM 0.60 0 - 1 % Final   11/22/2022 10:49 AM 0.6 0.0 - 2.0 % Final   11/23/2021 01:49 PM 0.6 0.0 - 2.0 % Final     Neutrophils Absolute   Date/Time Value Ref Range Status   11/21/2023 11:07 AM 7.18 (H) 1.60 - 5.50 x10*3/uL Final     Comment:     Percent differential counts (%) should be interpreted in the context of the absolute cell counts (cells/uL).   05/30/2023 01:50 PM 8.68 (H) 1.8 - 7.7 K/UL Final   11/22/2022 10:49 AM 7.99 (H) 1.60 - 5.50 x10E9/L Final   11/23/2021 01:49 PM 6.03 (H) 1.60 - 5.50 x10E9/L Final     Immature Granulocytes Absolute, Automated   Date/Time Value Ref Range Status   11/21/2023 11:07 AM 0.03 0.00 - 0.50 x10*3/uL Final   05/30/2023 01:50 PM 0.05 0.0 - 0.1 K/UL Final   05/11/2021 03:30 PM 0.07 0.0 - 0.1 K/UL Final   11/16/2020 09:50 AM 0.04 0.0 - 0.1 K/UL Final     Lymphocytes Absolute   Date/Time Value Ref Range Status   11/21/2023 11:07 AM 2.55 0.80 - 3.00 x10*3/uL Final   05/30/2023 01:50 PM 2.49 1.2 - 3.2 K/UL Final   11/22/2022 10:49 AM 2.96 0.80 - 3.00 x10E9/L Final   11/23/2021 01:49 PM 3.47 (H) 0.80 - 3.00 x10E9/L Final     Monocytes Absolute   Date/Time Value Ref Range Status   11/21/2023 11:07 AM 0.97 (H) 0.05 - 0.80 x10*3/uL Final   05/30/2023 01:50 PM 0.95 (H) 0 - 0.8 K/UL Final   11/22/2022 10:49 AM 1.08 (H) 0.05 - 0.80 x10E9/L Final   11/23/2021 01:49 PM 1.14 (H) 0.05 - 0.80 x10E9/L Final     Eosinophils Absolute   Date/Time Value Ref Range Status   11/21/2023 11:07 AM 0.14 0.00 - 0.40 x10*3/uL Final   05/30/2023 01:50 PM 0.16 0 - 0.45 K/UL  Final   11/22/2022 10:49 AM 0.27 0.00 - 0.40 x10E9/L Final   11/23/2021 01:49 PM 0.17 0.00 - 0.40 x10E9/L Final     Basophils Absolute   Date/Time Value Ref Range Status   11/21/2023 11:07 AM 0.07 0.00 - 0.10 x10*3/uL Final   05/30/2023 01:50 PM 0.07 0.00 - 0.22 K/UL Final   11/22/2022 10:49 AM 0.07 0.00 - 0.10 x10E9/L Final   11/23/2021 01:49 PM 0.07 0.00 - 0.10 x10E9/L Final       Assessment/Plan      CT CAP : RTC 8 weeks.  Patient with a history of lymphoma treated with Rituxan CHOP x 6 and splenectomy in 2014.  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  CT chest abdomen and pelvis prescribed.  If negative patient will be discharged from oncologic surveillance.    ABDOMEN AND PELVIS:      No significant lymphadenopathy. Likely status post splenectomy. 1.9 cm hypodense lesion in the pancreatic neck. See below.      Too small to characterize hypodense lesion in the dome of the liver. Noncalcified stones or sludge in the gallbladder. Distal colon diverticulosis. Broad left lateral abdominal wall/flank hernia.      MACRO:  Incidental Finding:  There is a small cystic lesion noted within the pancreas measuring less than or equal to 25 mm.  (**-YCF-**)      Instructions:  Follow-up contrast enhanced MRI or pancreas protocol CT every two years for 4 years or more frequent imaging versus EUS/FNA in case of interval growth. (Nash AJ, Esperanza ME, Josh DE, et al. Management of Incidental Pancreatic Cysts: A White Paper of the ACR Incidental Findings Committee. J Am Aaron Radiol. 2017;14(7):911-923.) [email or web address].[email or web address]      Signed by: Angle Burnette 2/14/2024     Will follow recommendations by radiology.  MRI of abdomen ordered in 1 year.     Diagnoses and all orders for this visit:  DLBCL (diffuse large B cell lymphoma) (CMS/HCC)  -     Clinic Appointment Request Follow Up  -     MR abdomen w and wo IV contrast; Future  -     Clinic Appointment Request JOSIE VALDEZ; Future           Josie Valdez MD

## 2024-02-16 NOTE — PATIENT INSTRUCTIONS
Today you met with Dr. Means.  He will see you back in 1 year after the MRI.      Please call the office for any questions or concerns.  We ask that you notify us 5-7 days before your medications need refilled.  WILLY Garcia is strongly encouraging all patients to access their MyChart for all results and to communicate with their provider for non-urgent messages.  If an urgent/same day/sick concern response is needed, please call the office at [phone]. Thank You!

## 2024-02-19 DIAGNOSIS — I10 PRIMARY HYPERTENSION: ICD-10-CM

## 2024-02-19 DIAGNOSIS — I10 ESSENTIAL HYPERTENSION: ICD-10-CM

## 2024-02-19 RX ORDER — LOSARTAN POTASSIUM 25 MG/1
25 TABLET ORAL DAILY
Qty: 90 TABLET | Refills: 3 | Status: SHIPPED | OUTPATIENT
Start: 2024-02-19

## 2024-02-19 RX ORDER — AMLODIPINE BESYLATE 10 MG/1
10 TABLET ORAL DAILY
Qty: 90 TABLET | Refills: 3 | Status: SHIPPED | OUTPATIENT
Start: 2024-02-19

## 2024-02-19 NOTE — TELEPHONE ENCOUNTER
Lov: 2023 upcomin2024. Pt called the office and stated that her amlodipine was sent to the mail order pharmacy. She said she needs this sent in locally please to the [facility] in [place] as well as another refill which I added.  Pt is also requesting a handicap placard letter. Is this ok? If so patient would like it mailed to her once completed.

## 2024-02-29 ENCOUNTER — TELEPHONE (OUTPATIENT)
Dept: PRIMARY CARE | Facility: CLINIC | Age: 89
End: 2024-02-29
Payer: MEDICARE

## 2024-02-29 DIAGNOSIS — E11.9 TYPE 2 DIABETES MELLITUS WITHOUT COMPLICATION, WITHOUT LONG-TERM CURRENT USE OF INSULIN (MULTI): Primary | ICD-10-CM

## 2024-02-29 DIAGNOSIS — N18.31 STAGE 3A CHRONIC KIDNEY DISEASE (MULTI): ICD-10-CM

## 2024-02-29 DIAGNOSIS — C85.90 LYMPHOMA, UNSPECIFIED BODY REGION, UNSPECIFIED LYMPHOMA TYPE (MULTI): ICD-10-CM

## 2024-05-30 ENCOUNTER — OFFICE VISIT (OUTPATIENT)
Dept: PRIMARY CARE | Facility: CLINIC | Age: 89
End: 2024-05-30
Payer: MEDICARE

## 2024-05-30 VITALS
DIASTOLIC BLOOD PRESSURE: 64 MMHG | BODY MASS INDEX: 28.63 KG/M2 | SYSTOLIC BLOOD PRESSURE: 132 MMHG | TEMPERATURE: 98.1 F | OXYGEN SATURATION: 97 % | HEART RATE: 55 BPM | HEIGHT: 62 IN | WEIGHT: 155.6 LBS

## 2024-05-30 DIAGNOSIS — N18.31 STAGE 3A CHRONIC KIDNEY DISEASE (MULTI): ICD-10-CM

## 2024-05-30 DIAGNOSIS — Z00.00 ROUTINE GENERAL MEDICAL EXAMINATION AT HEALTH CARE FACILITY: Primary | ICD-10-CM

## 2024-05-30 DIAGNOSIS — E11.9 TYPE 2 DIABETES MELLITUS WITHOUT COMPLICATION, WITHOUT LONG-TERM CURRENT USE OF INSULIN (MULTI): ICD-10-CM

## 2024-05-30 DIAGNOSIS — E55.9 VITAMIN D DEFICIENCY: ICD-10-CM

## 2024-05-30 DIAGNOSIS — I10 ESSENTIAL HYPERTENSION: ICD-10-CM

## 2024-05-30 DIAGNOSIS — R53.83 FATIGUE, UNSPECIFIED TYPE: ICD-10-CM

## 2024-05-30 DIAGNOSIS — L84 CALLUS OF FOOT: ICD-10-CM

## 2024-05-30 DIAGNOSIS — E78.2 MIXED HYPERLIPIDEMIA: ICD-10-CM

## 2024-05-30 LAB
ALBUMIN SERPL BCP-MCNC: 4.3 G/DL (ref 3.4–5)
ALP SERPL-CCNC: 66 U/L (ref 33–136)
ALT SERPL W P-5'-P-CCNC: 9 U/L (ref 7–45)
ANION GAP SERPL CALC-SCNC: 18 MMOL/L (ref 10–20)
AST SERPL W P-5'-P-CCNC: 14 U/L (ref 9–39)
BASOPHILS # BLD AUTO: 0.08 X10*3/UL (ref 0–0.1)
BASOPHILS NFR BLD AUTO: 0.6 %
BILIRUB SERPL-MCNC: 0.6 MG/DL (ref 0–1.2)
BUN SERPL-MCNC: 23 MG/DL (ref 6–23)
CALCIUM SERPL-MCNC: 10.1 MG/DL (ref 8.6–10.3)
CHLORIDE SERPL-SCNC: 103 MMOL/L (ref 98–107)
CHOLEST SERPL-MCNC: 229 MG/DL (ref 0–199)
CHOLESTEROL/HDL RATIO: 4.5
CO2 SERPL-SCNC: 23 MMOL/L (ref 21–32)
CREAT SERPL-MCNC: 0.81 MG/DL (ref 0.5–1.05)
EGFRCR SERPLBLD CKD-EPI 2021: 70 ML/MIN/1.73M*2
EOSINOPHIL # BLD AUTO: 0.15 X10*3/UL (ref 0–0.4)
EOSINOPHIL NFR BLD AUTO: 1.2 %
ERYTHROCYTE [DISTWIDTH] IN BLOOD BY AUTOMATED COUNT: 13.7 % (ref 11.5–14.5)
GLUCOSE SERPL-MCNC: 135 MG/DL (ref 74–99)
HCT VFR BLD AUTO: 41.1 % (ref 36–46)
HDLC SERPL-MCNC: 51.1 MG/DL
HGB BLD-MCNC: 13.5 G/DL (ref 12–16)
IMM GRANULOCYTES # BLD AUTO: 0.03 X10*3/UL (ref 0–0.5)
IMM GRANULOCYTES NFR BLD AUTO: 0.2 % (ref 0–0.9)
LDLC SERPL CALC-MCNC: 122 MG/DL
LYMPHOCYTES # BLD AUTO: 2.63 X10*3/UL (ref 0.8–3)
LYMPHOCYTES NFR BLD AUTO: 20.6 %
MCH RBC QN AUTO: 32.4 PG (ref 26–34)
MCHC RBC AUTO-ENTMCNC: 32.8 G/DL (ref 32–36)
MCV RBC AUTO: 99 FL (ref 80–100)
MONOCYTES # BLD AUTO: 0.94 X10*3/UL (ref 0.05–0.8)
MONOCYTES NFR BLD AUTO: 7.4 %
NEUTROPHILS # BLD AUTO: 8.93 X10*3/UL (ref 1.6–5.5)
NEUTROPHILS NFR BLD AUTO: 70 %
NON HDL CHOLESTEROL: 178 MG/DL (ref 0–149)
NRBC BLD-RTO: 0 /100 WBCS (ref 0–0)
PLATELET # BLD AUTO: 449 X10*3/UL (ref 150–450)
POC HEMOGLOBIN A1C: 7.2 % (ref 4.2–6.5)
POTASSIUM SERPL-SCNC: 5.3 MMOL/L (ref 3.5–5.3)
PROT SERPL-MCNC: 7.7 G/DL (ref 6.4–8.2)
RBC # BLD AUTO: 4.17 X10*6/UL (ref 4–5.2)
SODIUM SERPL-SCNC: 139 MMOL/L (ref 136–145)
TRIGL SERPL-MCNC: 282 MG/DL (ref 0–149)
TSH SERPL-ACNC: 3.71 MIU/L (ref 0.44–3.98)
VLDL: 56 MG/DL (ref 0–40)
WBC # BLD AUTO: 12.8 X10*3/UL (ref 4.4–11.3)

## 2024-05-30 PROCEDURE — 82306 VITAMIN D 25 HYDROXY: CPT

## 2024-05-30 PROCEDURE — 1036F TOBACCO NON-USER: CPT | Performed by: NURSE PRACTITIONER

## 2024-05-30 PROCEDURE — G0439 PPPS, SUBSEQ VISIT: HCPCS | Performed by: NURSE PRACTITIONER

## 2024-05-30 PROCEDURE — 1124F ACP DISCUSS-NO DSCNMKR DOCD: CPT | Performed by: NURSE PRACTITIONER

## 2024-05-30 PROCEDURE — 84443 ASSAY THYROID STIM HORMONE: CPT

## 2024-05-30 PROCEDURE — 1157F ADVNC CARE PLAN IN RCRD: CPT | Performed by: NURSE PRACTITIONER

## 2024-05-30 PROCEDURE — 1159F MED LIST DOCD IN RCRD: CPT | Performed by: NURSE PRACTITIONER

## 2024-05-30 PROCEDURE — 1170F FXNL STATUS ASSESSED: CPT | Performed by: NURSE PRACTITIONER

## 2024-05-30 PROCEDURE — 36415 COLL VENOUS BLD VENIPUNCTURE: CPT

## 2024-05-30 PROCEDURE — 85025 COMPLETE CBC W/AUTO DIFF WBC: CPT

## 2024-05-30 PROCEDURE — 80061 LIPID PANEL: CPT

## 2024-05-30 PROCEDURE — 3075F SYST BP GE 130 - 139MM HG: CPT | Performed by: NURSE PRACTITIONER

## 2024-05-30 PROCEDURE — 80053 COMPREHEN METABOLIC PANEL: CPT

## 2024-05-30 PROCEDURE — 1160F RVW MEDS BY RX/DR IN RCRD: CPT | Performed by: NURSE PRACTITIONER

## 2024-05-30 PROCEDURE — 83036 HEMOGLOBIN GLYCOSYLATED A1C: CPT | Performed by: NURSE PRACTITIONER

## 2024-05-30 PROCEDURE — 1126F AMNT PAIN NOTED NONE PRSNT: CPT | Performed by: NURSE PRACTITIONER

## 2024-05-30 PROCEDURE — 3078F DIAST BP <80 MM HG: CPT | Performed by: NURSE PRACTITIONER

## 2024-05-30 ASSESSMENT — ENCOUNTER SYMPTOMS
BRUISES/BLEEDS EASILY: 0
JOINT SWELLING: 0
SORE THROAT: 0
ADENOPATHY: 0
DIARRHEA: 0
BLOOD IN STOOL: 0
COUGH: 0
LOSS OF SENSATION IN FEET: 0
APPETITE CHANGE: 0
WHEEZING: 0
BACK PAIN: 0
DIZZINESS: 0
ACTIVITY CHANGE: 0
OCCASIONAL FEELINGS OF UNSTEADINESS: 0
HEMATURIA: 0
HEADACHES: 0
EYE DISCHARGE: 0
PALPITATIONS: 0
AGITATION: 0
SINUS PAIN: 0
TREMORS: 0
NERVOUS/ANXIOUS: 0
DYSURIA: 0
ARTHRALGIAS: 0
SHORTNESS OF BREATH: 0
WEAKNESS: 0
DEPRESSION: 0
PHOTOPHOBIA: 0
ABDOMINAL PAIN: 0
CONSTIPATION: 0

## 2024-05-30 ASSESSMENT — ACTIVITIES OF DAILY LIVING (ADL)
DRESSING: INDEPENDENT
MANAGING_FINANCES: INDEPENDENT
TAKING_MEDICATION: INDEPENDENT
GROCERY_SHOPPING: NEEDS ASSISTANCE
DOING_HOUSEWORK: NEEDS ASSISTANCE
BATHING: INDEPENDENT

## 2024-05-30 ASSESSMENT — PATIENT HEALTH QUESTIONNAIRE - PHQ9
1. LITTLE INTEREST OR PLEASURE IN DOING THINGS: NOT AT ALL
SUM OF ALL RESPONSES TO PHQ9 QUESTIONS 1 AND 2: 0
2. FEELING DOWN, DEPRESSED OR HOPELESS: NOT AT ALL

## 2024-05-30 ASSESSMENT — PAIN SCALES - GENERAL: PAINLEVEL: 0-NO PAIN

## 2024-05-30 ASSESSMENT — LIFESTYLE VARIABLES
HOW OFTEN DO YOU HAVE A DRINK CONTAINING ALCOHOL: NEVER
HOW MANY STANDARD DRINKS CONTAINING ALCOHOL DO YOU HAVE ON A TYPICAL DAY: PATIENT DOES NOT DRINK

## 2024-05-30 NOTE — PATIENT INSTRUCTIONS
Focus on healthy eating including lean proteins and vegetables.  Avoid high carbohydrate high sugar foods and drinks.  Try to increase physical activity as tolerated.  Goal is for 160 minutes/week of physical activity.  Check blood pressure 2-3 times a week.  Call for blood pressures greater than 140/90.  Bring list of blood pressures to next visit.  Consider using mediplanner to manage your medications

## 2024-05-30 NOTE — LETTER
May 31, 2024     Soni Herrera  [address] [place] [address]      Dear Ms. Herrera:    Below are the results from your recent visit:  Total cholesterol bad cholesterol and triglycerides are elevated.  Good cholesterol is normal.  Blood sugar is elevated at 135.  Kidney and liver numbers look good.  Blood count is good.  Vitamin D level is normal.  Thyroid level is in the normal range.     Resulted Orders   POCT glycosylated hemoglobin (Hb A1C) manually resulted   Result Value Ref Range    POC HEMOGLOBIN A1c 7.2 (A) 4.2 - 6.5 %   Lipid Panel   Result Value Ref Range    Cholesterol 229 (H) 0 - 199 mg/dL      Comment:            Age      Desirable   Borderline High   High     0-19 Y     0 - 169       170 - 199     >/= 200    20-24 Y     0 - 189       190 - 224     >/= 225         >24 Y     0 - 199       200 - 239     >/= 240   **All ranges are based on fasting samples. Specific   therapeutic targets will vary based on patient-specific   cardiac risk.    Pediatric guidelines reference:Pediatrics 2011, 128(S5).Adult guidelines reference: NCEP ATPIII Guidelines,SARA 2001, 258:2486-97    Venipuncture immediately after or during the administration of Metamizole may lead to falsely low results. Testing should be performed immediately prior to Metamizole dosing.    HDL-Cholesterol 51.1 mg/dL      Comment:        Age       Very Low   Low     Normal    High    0-19 Y    < 35      < 40     40-45     ----  20-24 Y    ----     < 40      >45      ----        >24 Y      ----     < 40     40-60      >60      Cholesterol/HDL Ratio 4.5       Comment:        Ref Values  Desirable  < 3.4  High Risk  > 5.0    LDL Calculated 122 (H) <=99 mg/dL      Comment:                                  Near   Borderline      AGE      Desirable  Optimal    High     High     Very High     0-19 Y     0 - 109     ---    110-129   >/= 130     ----    20-24 Y     0 - 119     ---    120-159   >/= 160     ----      >24 Y     0 -  99   100-129  130-159    160-189     >/=190      VLDL 56 (H) 0 - 40 mg/dL    Triglycerides 282 (H) 0 - 149 mg/dL      Comment:         Age         Desirable   Borderline High   High     Very High   0 D-90 D    19 - 174         ----         ----        ----  91 D- 9 Y     0 -  74        75 -  99     >/= 100      ----    10-19 Y     0 -  89        90 - 129     >/= 130      ----    20-24 Y     0 - 114       115 - 149     >/= 150      ----         >24 Y     0 - 149       150 - 199    200- 499    >/= 500    Venipuncture immediately after or during the administration of Metamizole may lead to falsely low results. Testing should be performed immediately prior to Metamizole dosing.    Non HDL Cholesterol 178 (H) 0 - 149 mg/dL      Comment:            Age       Desirable   Borderline High   High     Very High     0-19 Y     0 - 119       120 - 144     >/= 145    >/= 160    20-24 Y     0 - 149       150 - 189     >/= 190      ----         >24 Y    30 mg/dL above LDL Cholesterol goal     CBC and Auto Differential   Result Value Ref Range    WBC 12.8 (H) 4.4 - 11.3 x10*3/uL    nRBC 0.0 0.0 - 0.0 /100 WBCs    RBC 4.17 4.00 - 5.20 x10*6/uL    Hemoglobin 13.5 12.0 - 16.0 g/dL    Hematocrit 41.1 36.0 - 46.0 %    MCV 99 80 - 100 fL    MCH 32.4 26.0 - 34.0 pg    MCHC 32.8 32.0 - 36.0 g/dL    RDW 13.7 11.5 - 14.5 %    Platelets 449 150 - 450 x10*3/uL    Neutrophils % 70.0 40.0 - 80.0 %    Immature Granulocytes %, Automated 0.2 0.0 - 0.9 %      Comment:      Immature Granulocyte Count (IG) includes promyelocytes, myelocytes and metamyelocytes but does not include bands. Percent differential counts (%) should be interpreted in the context of the absolute cell counts (cells/UL).    Lymphocytes % 20.6 13.0 - 44.0 %    Monocytes % 7.4 2.0 - 10.0 %    Eosinophils % 1.2 0.0 - 6.0 %    Basophils % 0.6 0.0 - 2.0 %    Neutrophils Absolute 8.93 (H) 1.60 - 5.50 x10*3/uL      Comment:      Percent differential counts (%) should be interpreted in the context of the  absolute cell counts (cells/uL).    Immature Granulocytes Absolute, Automated 0.03 0.00 - 0.50 x10*3/uL    Lymphocytes Absolute 2.63 0.80 - 3.00 x10*3/uL    Monocytes Absolute 0.94 (H) 0.05 - 0.80 x10*3/uL    Eosinophils Absolute 0.15 0.00 - 0.40 x10*3/uL    Basophils Absolute 0.08 0.00 - 0.10 x10*3/uL   Comprehensive Metabolic Panel   Result Value Ref Range    Glucose 135 (H) 74 - 99 mg/dL    Sodium 139 136 - 145 mmol/L    Potassium 5.3 3.5 - 5.3 mmol/L    Chloride 103 98 - 107 mmol/L    Bicarbonate 23 21 - 32 mmol/L    Anion Gap 18 10 - 20 mmol/L    Urea Nitrogen 23 6 - 23 mg/dL    Creatinine 0.81 0.50 - 1.05 mg/dL    eGFR 70 >60 mL/min/1.73m*2      Comment:      Calculations of estimated GFR are performed using the 2021 CKD-EPI Study Refit equation without the race variable for the IDMS-Traceable creatinine methods.  https://jasn.asnjournals.org/content/early/2021/09/22/ASN.0189871597    Calcium 10.1 8.6 - 10.3 mg/dL    Albumin 4.3 3.4 - 5.0 g/dL    Alkaline Phosphatase 66 33 - 136 U/L    Total Protein 7.7 6.4 - 8.2 g/dL    AST 14 9 - 39 U/L    Bilirubin, Total 0.6 0.0 - 1.2 mg/dL    ALT 9 7 - 45 U/L      Comment:      Patients treated with Sulfasalazine may generate falsely decreased results for ALT.   TSH with reflex to Free T4 if abnormal   Result Value Ref Range    Thyroid Stimulating Hormone 3.71 0.44 - 3.98 mIU/L    Narrative    TSH testing is performed using different testing methodology at [facility] than at other [facility] hospitals. Direct result comparisons should only be made within the same method.     Vitamin D 25-Hydroxy,Total (for eval of Vitamin D levels)   Result Value Ref Range    Vitamin D, 25-Hydroxy, Total 30 30 - 100 ng/mL    Narrative    Deficiency:         < 20   ng/ml  Insufficiency:      20-29  ng/ml  Sufficiency:         ng/ml  This assay accurately quantifies the sum of Vitamin D3, 25-Hydroxy and Vitamin D2,25-Hydroxy.       If you have any questions or concerns,  please don't hesitate to call.         Sincerely,      [facility]

## 2024-05-30 NOTE — PROGRESS NOTES
Subjective   Reason for Visit: Soni Herrera is an 89 y.o. female here for a Medicare Wellness visit.     Past Medical, Surgical, and Family History reviewed and updated in chart.    Reviewed all medications by prescribing practitioner or clinical pharmacist (such as prescriptions, OTCs, herbal therapies and supplements) and documented in the medical record.    Presents today for annual Medicare wellness exam.  She denies new concerns today.  Her  passed away in August 2023.  She reports that she continues to live at home.  She does have additional help outside of the home.  She has help with laundry and housework.  She manages her own meals.  She also manages her own medications.  She reports that she takes her pills out of the bottles daily for her daily doses.  Discussed with her the benefits of utilizing Medi planner system.  She agrees this would be a benefit and will start to do that.  She denies chest pain or shortness of breath.  She denies numbness or tingling to her legs.  She denies issues with urinating or having bowel movements.  She denies blood in her urine or blood in her stools.  Hemoglobin A1c in the office today is 7.2 which is the same as it was in November.  She denies any low blood sugars.  * This note was partially generated using the Dragon Voice recognition system. There may be some incorrect wording, spelling and/or spelling errors or punctuation errors that were not corrected prior to committing the note to the medical record.*        Patient Care Team:  MEGHANN Alcocer as PCP - General (Family Medicine)  MEGHANN Alcocer as PCP - tna Medicare Advantage PCP  Josie Means MD as Referring Physician (Hematology and Oncology)     Review of Systems   Constitutional:  Negative for activity change and appetite change.   HENT:  Negative for congestion, ear pain, hearing loss, sinus pain and sore throat.    Eyes:  Negative for photophobia, discharge and visual  "disturbance.   Respiratory:  Negative for cough, shortness of breath and wheezing.    Cardiovascular:  Negative for chest pain, palpitations and leg swelling.   Gastrointestinal:  Negative for abdominal pain, blood in stool, constipation and diarrhea.   Endocrine: Negative for cold intolerance, heat intolerance and polyuria.   Genitourinary:  Negative for dysuria and hematuria.   Musculoskeletal:  Negative for arthralgias, back pain and joint swelling.   Skin:  Negative for rash.   Allergic/Immunologic: Negative for environmental allergies and food allergies.   Neurological:  Negative for dizziness, tremors, syncope, weakness and headaches.   Hematological:  Negative for adenopathy. Does not bruise/bleed easily.   Psychiatric/Behavioral:  Negative for agitation and suicidal ideas. The patient is not nervous/anxious.        Objective   Vitals:  /64 (BP Location: Right arm, Patient Position: Sitting)   Pulse 55   Temp 36.7 °C (98.1 °F) (Temporal)   Ht 1.575 m (5' 2\")   Wt 70.6 kg (155 lb 9.6 oz)   SpO2 97%   BMI 28.46 kg/m²       Physical Exam  Vitals reviewed.   Constitutional:       General: She is not in acute distress.     Appearance: Normal appearance.   HENT:      Head: Normocephalic.      Right Ear: Tympanic membrane normal.      Left Ear: Tympanic membrane normal.      Nose: Nose normal.      Mouth/Throat:      Mouth: Mucous membranes are moist.   Eyes:      Conjunctiva/sclera: Conjunctivae normal.      Pupils: Pupils are equal, round, and reactive to light.   Cardiovascular:      Rate and Rhythm: Normal rate and regular rhythm.      Pulses: Normal pulses.      Heart sounds: Normal heart sounds.   Pulmonary:      Effort: Pulmonary effort is normal.      Breath sounds: Normal breath sounds.   Abdominal:      General: Bowel sounds are normal.      Palpations: Abdomen is soft.   Musculoskeletal:         General: Normal range of motion.   Skin:     General: Skin is warm and dry.      Capillary Refill: " Capillary refill takes less than 2 seconds.   Neurological:      Mental Status: She is alert and oriented to person, place, and time.   Psychiatric:         Mood and Affect: Mood normal.         Speech: Speech normal.         Assessment/Plan   Problem List Items Addressed This Visit       Type 2 diabetes mellitus without complications (Multi)    Relevant Orders    POCT glycosylated hemoglobin (Hb A1C) manually resulted (Completed)    Comprehensive Metabolic Panel    Follow Up In Primary Care - Established    Referral to Podiatry    Essential hypertension    Relevant Orders    CBC and Auto Differential    Comprehensive Metabolic Panel    Follow Up In Primary Care - Established    Hyperlipidemia    Relevant Orders    Lipid Panel    Stage 3a chronic kidney disease (Multi)    Relevant Orders    CBC and Auto Differential    Comprehensive Metabolic Panel    Follow Up In Primary Care - Established     Other Visit Diagnoses       Routine general medical examination at health care facility    -  Primary    Vitamin D deficiency        Relevant Orders    Vitamin D 25-Hydroxy,Total (for eval of Vitamin D levels)    Fatigue, unspecified type        Relevant Orders    CBC and Auto Differential    TSH with reflex to Free T4 if abnormal    Callus of foot        Relevant Orders    Referral to Podiatry        Focus on healthy eating including lean proteins and vegetables.  Avoid high carbohydrate high sugar foods and drinks.  Try to increase physical activity as tolerated.  Goal is for 160 minutes/week of physical activity.  Check blood pressure 2-3 times a week.  Call for blood pressures greater than 140/90.  Bring list of blood pressures to next visit.  Consider using mediplanner to manage your medications

## 2024-05-31 LAB — 25(OH)D3 SERPL-MCNC: 30 NG/ML (ref 30–100)

## 2024-07-25 DIAGNOSIS — I10 ESSENTIAL HYPERTENSION: Primary | ICD-10-CM

## 2024-07-25 RX ORDER — ATENOLOL 50 MG/1
50 TABLET ORAL DAILY
Qty: 90 TABLET | Refills: 1 | Status: SHIPPED | OUTPATIENT
Start: 2024-07-25 | End: 2025-01-21

## 2024-10-07 DIAGNOSIS — E11.9 TYPE 2 DIABETES MELLITUS WITHOUT COMPLICATION, WITHOUT LONG-TERM CURRENT USE OF INSULIN (MULTI): Primary | ICD-10-CM

## 2024-10-07 RX ORDER — METFORMIN HYDROCHLORIDE 500 MG/1
1000 TABLET ORAL
Qty: 60 TABLET | Refills: 2 | Status: SHIPPED | OUTPATIENT
Start: 2024-10-07 | End: 2025-01-05

## 2024-11-05 DIAGNOSIS — E11.9 TYPE 2 DIABETES MELLITUS WITHOUT COMPLICATION, WITHOUT LONG-TERM CURRENT USE OF INSULIN (MULTI): ICD-10-CM

## 2024-11-05 RX ORDER — METFORMIN HYDROCHLORIDE 500 MG/1
1000 TABLET ORAL
Qty: 180 TABLET | Refills: 1 | Status: SHIPPED | OUTPATIENT
Start: 2024-11-05 | End: 2025-11-05

## 2024-11-25 DIAGNOSIS — I10 PRIMARY HYPERTENSION: ICD-10-CM

## 2024-11-26 RX ORDER — LOSARTAN POTASSIUM 25 MG/1
25 TABLET ORAL DAILY
Qty: 90 TABLET | Refills: 3 | Status: SHIPPED | OUTPATIENT
Start: 2024-11-26

## 2024-12-03 ENCOUNTER — APPOINTMENT (OUTPATIENT)
Dept: PRIMARY CARE | Facility: CLINIC | Age: 89
End: 2024-12-03
Payer: MEDICARE

## 2024-12-11 ENCOUNTER — APPOINTMENT (OUTPATIENT)
Dept: RADIOLOGY | Facility: HOSPITAL | Age: 89
DRG: 065 | End: 2024-12-11
Payer: MEDICARE

## 2024-12-11 ENCOUNTER — APPOINTMENT (OUTPATIENT)
Dept: CARDIOLOGY | Facility: HOSPITAL | Age: 89
DRG: 065 | End: 2024-12-11
Payer: MEDICARE

## 2024-12-11 ENCOUNTER — HOSPITAL ENCOUNTER (INPATIENT)
Facility: HOSPITAL | Age: 89
DRG: 065 | End: 2024-12-11
Attending: EMERGENCY MEDICINE | Admitting: INTERNAL MEDICINE
Payer: MEDICARE

## 2024-12-11 DIAGNOSIS — I49.8 OTHER CARDIAC ARRHYTHMIA: ICD-10-CM

## 2024-12-11 DIAGNOSIS — G45.8 OTHER TRANSIENT CEREBRAL ISCHEMIC ATTACKS AND RELATED SYNDROMES: ICD-10-CM
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
## 2024-12-11 DIAGNOSIS — R79.89 ELEVATED TROPONIN: ICD-10-CM

## 2024-12-11 DIAGNOSIS — I63.9 STROKE WITH CEREBRAL ISCHEMIA (MULTI): ICD-10-CM

## 2024-12-11 DIAGNOSIS — I63.522 ACUTE ISCHEMIC LEFT ACA STROKE (MULTI): ICD-10-CM

## 2024-12-11 DIAGNOSIS — R21 RASH: ICD-10-CM

## 2024-12-11 DIAGNOSIS — N30.00 ACUTE CYSTITIS WITHOUT HEMATURIA: ICD-10-CM

## 2024-12-11 LAB
ABO GROUP (TYPE) IN BLOOD: NORMAL
ALBUMIN SERPL BCP-MCNC: 3.6 G/DL (ref 3.4–5)
ALP SERPL-CCNC: 76 U/L (ref 33–136)
ALT SERPL W P-5'-P-CCNC: 16 U/L (ref 7–45)
ANION GAP BLDV CALCULATED.4IONS-SCNC: 12 MMOL/L (ref 10–25)
ANION GAP SERPL CALC-SCNC: 16 MMOL/L (ref 10–20)
ANTIBODY SCREEN: NORMAL
APPEARANCE UR: ABNORMAL
AST SERPL W P-5'-P-CCNC: 24 U/L (ref 9–39)
BACTERIA #/AREA URNS AUTO: ABNORMAL /HPF
BASE EXCESS BLDV CALC-SCNC: -1.2 MMOL/L (ref -2–3)
BASOPHILS # BLD AUTO: 0.1 X10*3/UL (ref 0–0.1)
BASOPHILS NFR BLD AUTO: 0.4 %
BILIRUB SERPL-MCNC: 1.5 MG/DL (ref 0–1.2)
BILIRUB UR STRIP.AUTO-MCNC: NEGATIVE MG/DL
BNP SERPL-MCNC: 136 PG/ML (ref 0–99)
BNP SERPL-MCNC: 143 PG/ML (ref 0–99)
BODY TEMPERATURE: ABNORMAL
BUN SERPL-MCNC: 28 MG/DL (ref 6–23)
CA-I BLDV-SCNC: 1.25 MMOL/L (ref 1.1–1.33)
CALCIUM SERPL-MCNC: 9.2 MG/DL (ref 8.6–10.3)
CARDIAC TROPONIN I PNL SERPL HS: 224 NG/L (ref 0–13)
CARDIAC TROPONIN I PNL SERPL HS: 226 NG/L (ref 0–13)
CHLORIDE BLDV-SCNC: 101 MMOL/L (ref 98–107)
CHLORIDE SERPL-SCNC: 101 MMOL/L (ref 98–107)
CHOLEST SERPL-MCNC: 197 MG/DL (ref 0–199)
CHOLESTEROL/HDL RATIO: 3.8
CK SERPL-CCNC: 432 U/L (ref 0–215)
CO2 SERPL-SCNC: 25 MMOL/L (ref 21–32)
COLOR UR: ABNORMAL
CREAT SERPL-MCNC: 0.87 MG/DL (ref 0.5–1.05)
EGFRCR SERPLBLD CKD-EPI 2021: 64 ML/MIN/1.73M*2
EOSINOPHIL # BLD AUTO: 0.05 X10*3/UL (ref 0–0.4)
EOSINOPHIL NFR BLD AUTO: 0.2 %
ERYTHROCYTE [DISTWIDTH] IN BLOOD BY AUTOMATED COUNT: 13.3 % (ref 11.5–14.5)
FLUAV RNA RESP QL NAA+PROBE: NOT DETECTED
FLUBV RNA RESP QL NAA+PROBE: NOT DETECTED
GLUCOSE BLD MANUAL STRIP-MCNC: 175 MG/DL (ref 74–99)
GLUCOSE BLD MANUAL STRIP-MCNC: 230 MG/DL (ref 74–99)
GLUCOSE BLDV-MCNC: 314 MG/DL (ref 74–99)
GLUCOSE SERPL-MCNC: 282 MG/DL (ref 74–99)
GLUCOSE UR STRIP.AUTO-MCNC: NORMAL MG/DL
HCO3 BLDV-SCNC: 24.8 MMOL/L (ref 22–26)
HCT VFR BLD AUTO: 40.9 % (ref 36–46)
HCT VFR BLD EST: 52 % (ref 36–46)
HDLC SERPL-MCNC: 51.3 MG/DL
HGB BLD-MCNC: 13.6 G/DL (ref 12–16)
HGB BLDV-MCNC: 17.3 G/DL (ref 12–16)
IMM GRANULOCYTES # BLD AUTO: 0.14 X10*3/UL (ref 0–0.5)
IMM GRANULOCYTES NFR BLD AUTO: 0.6 % (ref 0–0.9)
INHALED O2 CONCENTRATION: 21 %
INR PPP: 1.1 (ref 0.9–1.1)
KETONES UR STRIP.AUTO-MCNC: ABNORMAL MG/DL
LACTATE BLDV-SCNC: 2 MMOL/L (ref 0.4–2)
LACTATE SERPL-SCNC: 1.6 MMOL/L (ref 0.4–2)
LDLC SERPL CALC-MCNC: 113 MG/DL
LEUKOCYTE ESTERASE UR QL STRIP.AUTO: ABNORMAL
LYMPHOCYTES # BLD AUTO: 2.14 X10*3/UL (ref 0.8–3)
LYMPHOCYTES NFR BLD AUTO: 9.2 %
MAGNESIUM SERPL-MCNC: 1.57 MG/DL (ref 1.6–2.4)
MCH RBC QN AUTO: 32.2 PG (ref 26–34)
MCHC RBC AUTO-ENTMCNC: 33.3 G/DL (ref 32–36)
MCV RBC AUTO: 97 FL (ref 80–100)
MONOCYTES # BLD AUTO: 1.12 X10*3/UL (ref 0.05–0.8)
MONOCYTES NFR BLD AUTO: 4.8 %
NEUTROPHILS # BLD AUTO: 19.83 X10*3/UL (ref 1.6–5.5)
NEUTROPHILS NFR BLD AUTO: 84.8 %
NITRITE UR QL STRIP.AUTO: NEGATIVE
NON HDL CHOLESTEROL: 146 MG/DL (ref 0–149)
NRBC BLD-RTO: 0 /100 WBCS (ref 0–0)
OXYHGB MFR BLDV: 23.5 % (ref 45–75)
PCO2 BLDV: 45 MM HG (ref 41–51)
PH BLDV: 7.35 PH (ref 7.33–7.43)
PH UR STRIP.AUTO: 6 [PH]
PLATELET # BLD AUTO: 454 X10*3/UL (ref 150–450)
PO2 BLDV: 18 MM HG (ref 35–45)
POTASSIUM BLDV-SCNC: 3.6 MMOL/L (ref 3.5–5.3)
POTASSIUM SERPL-SCNC: 3.7 MMOL/L (ref 3.5–5.3)
PROT SERPL-MCNC: 6.9 G/DL (ref 6.4–8.2)
PROT UR STRIP.AUTO-MCNC: ABNORMAL MG/DL
PROTHROMBIN TIME: 12.2 SECONDS (ref 9.8–12.8)
RBC # BLD AUTO: 4.22 X10*6/UL (ref 4–5.2)
RBC # UR STRIP.AUTO: ABNORMAL /UL
RBC #/AREA URNS AUTO: ABNORMAL /HPF
RH FACTOR (ANTIGEN D): NORMAL
SAO2 % BLDV: 24 % (ref 45–75)
SARS-COV-2 RNA RESP QL NAA+PROBE: NOT DETECTED
SODIUM BLDV-SCNC: 134 MMOL/L (ref 136–145)
SODIUM SERPL-SCNC: 138 MMOL/L (ref 136–145)
SP GR UR STRIP.AUTO: 1.03
SQUAMOUS #/AREA URNS AUTO: ABNORMAL /HPF
TRIGL SERPL-MCNC: 165 MG/DL (ref 0–149)
UROBILINOGEN UR STRIP.AUTO-MCNC: ABNORMAL MG/DL
VLDL: 33 MG/DL (ref 0–40)
WBC # BLD AUTO: 23.4 X10*3/UL (ref 4.4–11.3)
WBC #/AREA URNS AUTO: ABNORMAL /HPF

## 2024-12-11 PROCEDURE — 70547 MR ANGIOGRAPHY NECK W/O DYE: CPT | Performed by: STUDENT IN AN ORGANIZED HEALTH CARE EDUCATION/TRAINING PROGRAM

## 2024-12-11 PROCEDURE — 82947 ASSAY GLUCOSE BLOOD QUANT: CPT

## 2024-12-11 PROCEDURE — 87086 URINE CULTURE/COLONY COUNT: CPT | Mod: GEALAB | Performed by: HEALTH CARE PROVIDER

## 2024-12-11 PROCEDURE — 85610 PROTHROMBIN TIME: CPT | Performed by: HEALTH CARE PROVIDER

## 2024-12-11 PROCEDURE — 36415 COLL VENOUS BLD VENIPUNCTURE: CPT | Performed by: HEALTH CARE PROVIDER

## 2024-12-11 PROCEDURE — 99223 1ST HOSP IP/OBS HIGH 75: CPT | Performed by: INTERNAL MEDICINE

## 2024-12-11 PROCEDURE — 70450 CT HEAD/BRAIN W/O DYE: CPT | Performed by: RADIOLOGY

## 2024-12-11 PROCEDURE — 36415 COLL VENOUS BLD VENIPUNCTURE: CPT | Performed by: EMERGENCY MEDICINE

## 2024-12-11 PROCEDURE — 87636 SARSCOV2 & INF A&B AMP PRB: CPT | Performed by: HEALTH CARE PROVIDER

## 2024-12-11 PROCEDURE — 1200000002 HC GENERAL ROOM WITH TELEMETRY DAILY

## 2024-12-11 PROCEDURE — 2500000002 HC RX 250 W HCPCS SELF ADMINISTERED DRUGS (ALT 637 FOR MEDICARE OP, ALT 636 FOR OP/ED): Performed by: INTERNAL MEDICINE

## 2024-12-11 PROCEDURE — 2500000004 HC RX 250 GENERAL PHARMACY W/ HCPCS (ALT 636 FOR OP/ED): Performed by: HEALTH CARE PROVIDER

## 2024-12-11 PROCEDURE — 72128 CT CHEST SPINE W/O DYE: CPT | Mod: RCN | Performed by: RADIOLOGY

## 2024-12-11 PROCEDURE — 70544 MR ANGIOGRAPHY HEAD W/O DYE: CPT

## 2024-12-11 PROCEDURE — 83880 ASSAY OF NATRIURETIC PEPTIDE: CPT | Performed by: INTERNAL MEDICINE

## 2024-12-11 PROCEDURE — 94760 N-INVAS EAR/PLS OXIMETRY 1: CPT

## 2024-12-11 PROCEDURE — 86850 RBC ANTIBODY SCREEN: CPT | Performed by: HEALTH CARE PROVIDER

## 2024-12-11 PROCEDURE — 82565 ASSAY OF CREATININE: CPT | Performed by: HEALTH CARE PROVIDER

## 2024-12-11 PROCEDURE — 72125 CT NECK SPINE W/O DYE: CPT

## 2024-12-11 PROCEDURE — 70551 MRI BRAIN STEM W/O DYE: CPT

## 2024-12-11 PROCEDURE — 84484 ASSAY OF TROPONIN QUANT: CPT | Performed by: HEALTH CARE PROVIDER

## 2024-12-11 PROCEDURE — 70544 MR ANGIOGRAPHY HEAD W/O DYE: CPT | Performed by: STUDENT IN AN ORGANIZED HEALTH CARE EDUCATION/TRAINING PROGRAM

## 2024-12-11 PROCEDURE — 93005 ELECTROCARDIOGRAM TRACING: CPT

## 2024-12-11 PROCEDURE — 72125 CT NECK SPINE W/O DYE: CPT | Performed by: RADIOLOGY

## 2024-12-11 PROCEDURE — 74176 CT ABD & PELVIS W/O CONTRAST: CPT | Performed by: RADIOLOGY

## 2024-12-11 PROCEDURE — 96374 THER/PROPH/DIAG INJ IV PUSH: CPT | Mod: 59

## 2024-12-11 PROCEDURE — 80061 LIPID PANEL: CPT | Performed by: INTERNAL MEDICINE

## 2024-12-11 PROCEDURE — 72131 CT LUMBAR SPINE W/O DYE: CPT | Mod: RCN

## 2024-12-11 PROCEDURE — 81001 URINALYSIS AUTO W/SCOPE: CPT | Performed by: HEALTH CARE PROVIDER

## 2024-12-11 PROCEDURE — 70450 CT HEAD/BRAIN W/O DYE: CPT

## 2024-12-11 PROCEDURE — 2500000001 HC RX 250 WO HCPCS SELF ADMINISTERED DRUGS (ALT 637 FOR MEDICARE OP): Performed by: INTERNAL MEDICINE

## 2024-12-11 PROCEDURE — 83036 HEMOGLOBIN GLYCOSYLATED A1C: CPT | Mod: GEALAB | Performed by: INTERNAL MEDICINE

## 2024-12-11 PROCEDURE — 70551 MRI BRAIN STEM W/O DYE: CPT | Performed by: STUDENT IN AN ORGANIZED HEALTH CARE EDUCATION/TRAINING PROGRAM

## 2024-12-11 PROCEDURE — P9612 CATHETERIZE FOR URINE SPEC: HCPCS

## 2024-12-11 PROCEDURE — 71250 CT THORAX DX C-: CPT | Performed by: RADIOLOGY

## 2024-12-11 PROCEDURE — 84132 ASSAY OF SERUM POTASSIUM: CPT | Performed by: HEALTH CARE PROVIDER

## 2024-12-11 PROCEDURE — 96361 HYDRATE IV INFUSION ADD-ON: CPT

## 2024-12-11 PROCEDURE — 72131 CT LUMBAR SPINE W/O DYE: CPT | Mod: RCN | Performed by: RADIOLOGY

## 2024-12-11 PROCEDURE — 83605 ASSAY OF LACTIC ACID: CPT | Performed by: HEALTH CARE PROVIDER

## 2024-12-11 PROCEDURE — 99285 EMERGENCY DEPT VISIT HI MDM: CPT | Mod: 25 | Performed by: EMERGENCY MEDICINE

## 2024-12-11 PROCEDURE — 85025 COMPLETE CBC W/AUTO DIFF WBC: CPT | Performed by: HEALTH CARE PROVIDER

## 2024-12-11 PROCEDURE — 72128 CT CHEST SPINE W/O DYE: CPT | Mod: RCN

## 2024-12-11 PROCEDURE — 70547 MR ANGIOGRAPHY NECK W/O DYE: CPT

## 2024-12-11 PROCEDURE — 2500000004 HC RX 250 GENERAL PHARMACY W/ HCPCS (ALT 636 FOR OP/ED): Performed by: INTERNAL MEDICINE

## 2024-12-11 PROCEDURE — 74176 CT ABD & PELVIS W/O CONTRAST: CPT

## 2024-12-11 PROCEDURE — 83735 ASSAY OF MAGNESIUM: CPT | Performed by: HEALTH CARE PROVIDER

## 2024-12-11 PROCEDURE — 83880 ASSAY OF NATRIURETIC PEPTIDE: CPT | Performed by: HEALTH CARE PROVIDER

## 2024-12-11 PROCEDURE — 84484 ASSAY OF TROPONIN QUANT: CPT | Performed by: EMERGENCY MEDICINE

## 2024-12-11 PROCEDURE — 82550 ASSAY OF CK (CPK): CPT | Performed by: HEALTH CARE PROVIDER

## 2024-12-11 RX ORDER — HYDRALAZINE HYDROCHLORIDE 25 MG/1
25 TABLET, FILM COATED ORAL EVERY 6 HOURS PRN
Status: DISCONTINUED | OUTPATIENT
Start: 2024-12-13 | End: 2024-12-15

## 2024-12-11 RX ORDER — ATENOLOL 50 MG/1
50 TABLET ORAL DAILY
Status: DISCONTINUED | OUTPATIENT
Start: 2024-12-11 | End: 2024-12-18 | Stop reason: HOSPADM

## 2024-12-11 RX ORDER — INSULIN LISPRO 100 [IU]/ML
0-10 INJECTION, SOLUTION INTRAVENOUS; SUBCUTANEOUS
Status: DISCONTINUED | OUTPATIENT
Start: 2024-12-11 | End: 2024-12-16

## 2024-12-11 RX ORDER — ENOXAPARIN SODIUM 100 MG/ML
40 INJECTION SUBCUTANEOUS EVERY 24 HOURS
Status: DISCONTINUED | OUTPATIENT
Start: 2024-12-11 | End: 2024-12-18 | Stop reason: HOSPADM

## 2024-12-11 RX ORDER — SODIUM CHLORIDE, SODIUM LACTATE, POTASSIUM CHLORIDE, CALCIUM CHLORIDE 600; 310; 30; 20 MG/100ML; MG/100ML; MG/100ML; MG/100ML
50 INJECTION, SOLUTION INTRAVENOUS CONTINUOUS
Status: ACTIVE | OUTPATIENT
Start: 2024-12-11 | End: 2024-12-12

## 2024-12-11 RX ORDER — MAGNESIUM SULFATE HEPTAHYDRATE 40 MG/ML
2 INJECTION, SOLUTION INTRAVENOUS ONCE
Status: COMPLETED | OUTPATIENT
Start: 2024-12-11 | End: 2024-12-11

## 2024-12-11 RX ORDER — CEFTRIAXONE 1 G/50ML
1 INJECTION, SOLUTION INTRAVENOUS ONCE
Status: COMPLETED | OUTPATIENT
Start: 2024-12-11 | End: 2024-12-11

## 2024-12-11 RX ORDER — ONDANSETRON HYDROCHLORIDE 2 MG/ML
4 INJECTION, SOLUTION INTRAVENOUS ONCE
Status: COMPLETED | OUTPATIENT
Start: 2024-12-11 | End: 2024-12-11

## 2024-12-11 RX ORDER — HYDRALAZINE HYDROCHLORIDE 20 MG/ML
10 INJECTION INTRAMUSCULAR; INTRAVENOUS
Status: ACTIVE | OUTPATIENT
Start: 2024-12-11 | End: 2024-12-13

## 2024-12-11 RX ORDER — DEXTROSE 50 % IN WATER (D50W) INTRAVENOUS SYRINGE
12.5
Status: DISCONTINUED | OUTPATIENT
Start: 2024-12-11 | End: 2024-12-18 | Stop reason: HOSPADM

## 2024-12-11 RX ORDER — DEXTROSE 50 % IN WATER (D50W) INTRAVENOUS SYRINGE
25
Status: DISCONTINUED | OUTPATIENT
Start: 2024-12-11 | End: 2024-12-18 | Stop reason: HOSPADM

## 2024-12-11 RX ORDER — ATORVASTATIN CALCIUM 80 MG/1
80 TABLET, FILM COATED ORAL NIGHTLY
Status: DISCONTINUED | OUTPATIENT
Start: 2024-12-11 | End: 2024-12-18 | Stop reason: HOSPADM

## 2024-12-11 RX ORDER — ACETAMINOPHEN 650 MG/1
650 SUPPOSITORY RECTAL EVERY 4 HOURS PRN
Status: DISCONTINUED | OUTPATIENT
Start: 2024-12-11 | End: 2024-12-18 | Stop reason: HOSPADM

## 2024-12-11 RX ORDER — AMLODIPINE BESYLATE 10 MG/1
10 TABLET ORAL DAILY
Status: DISCONTINUED | OUTPATIENT
Start: 2024-12-11 | End: 2024-12-18 | Stop reason: HOSPADM

## 2024-12-11 RX ORDER — ASPIRIN 81 MG/1
81 TABLET ORAL DAILY
Status: DISCONTINUED | OUTPATIENT
Start: 2024-12-11 | End: 2024-12-11 | Stop reason: SDUPTHER

## 2024-12-11 RX ORDER — LABETALOL HYDROCHLORIDE 5 MG/ML
10 INJECTION, SOLUTION INTRAVENOUS EVERY 10 MIN PRN
Status: ACTIVE | OUTPATIENT
Start: 2024-12-11 | End: 2024-12-13

## 2024-12-11 RX ORDER — ACETAMINOPHEN 325 MG/1
650 TABLET ORAL EVERY 4 HOURS PRN
Status: DISCONTINUED | OUTPATIENT
Start: 2024-12-11 | End: 2024-12-18 | Stop reason: HOSPADM

## 2024-12-11 RX ORDER — LOSARTAN POTASSIUM 50 MG/1
25 TABLET ORAL DAILY
Status: DISCONTINUED | OUTPATIENT
Start: 2024-12-11 | End: 2024-12-18 | Stop reason: HOSPADM

## 2024-12-11 RX ORDER — CEFTRIAXONE 1 G/50ML
1 INJECTION, SOLUTION INTRAVENOUS EVERY 24 HOURS
Status: DISCONTINUED | OUTPATIENT
Start: 2024-12-12 | End: 2024-12-14

## 2024-12-11 RX ORDER — ACETAMINOPHEN 160 MG/5ML
650 SOLUTION ORAL EVERY 4 HOURS PRN
Status: DISCONTINUED | OUTPATIENT
Start: 2024-12-11 | End: 2024-12-18 | Stop reason: HOSPADM

## 2024-12-11 RX ORDER — NAPROXEN SODIUM 220 MG/1
81 TABLET, FILM COATED ORAL DAILY
Status: DISCONTINUED | OUTPATIENT
Start: 2024-12-11 | End: 2024-12-13

## 2024-12-11 SDOH — SOCIAL STABILITY: SOCIAL INSECURITY: ABUSE: ADULT

## 2024-12-11 SDOH — SOCIAL STABILITY: SOCIAL INSECURITY: WITHIN THE LAST YEAR, HAVE YOU BEEN HUMILIATED OR EMOTIONALLY ABUSED IN OTHER WAYS BY YOUR PARTNER OR EX-PARTNER?: NO

## 2024-12-11 SDOH — SOCIAL STABILITY: SOCIAL INSECURITY
WITHIN THE LAST YEAR, HAVE YOU BEEN RAPED OR FORCED TO HAVE ANY KIND OF SEXUAL ACTIVITY BY YOUR PARTNER OR EX-PARTNER?: NO

## 2024-12-11 SDOH — SOCIAL STABILITY: SOCIAL INSECURITY
WITHIN THE LAST YEAR, HAVE YOU BEEN KICKED, HIT, SLAPPED, OR OTHERWISE PHYSICALLY HURT BY YOUR PARTNER OR EX-PARTNER?: NO

## 2024-12-11 SDOH — SOCIAL STABILITY: SOCIAL INSECURITY: WERE YOU ABLE TO COMPLETE ALL THE BEHAVIORAL HEALTH SCREENINGS?: YES

## 2024-12-11 SDOH — SOCIAL STABILITY: SOCIAL INSECURITY: HAVE YOU HAD ANY THOUGHTS OF HARMING ANYONE ELSE?: NO

## 2024-12-11 SDOH — SOCIAL STABILITY: SOCIAL INSECURITY: WITHIN THE LAST YEAR, HAVE YOU BEEN AFRAID OF YOUR PARTNER OR EX-PARTNER?: NO

## 2024-12-11 SDOH — ECONOMIC STABILITY: FOOD INSECURITY: WITHIN THE PAST 12 MONTHS, THE FOOD YOU BOUGHT JUST DIDN'T LAST AND YOU DIDN'T HAVE MONEY TO GET MORE.: NEVER TRUE

## 2024-12-11 SDOH — SOCIAL STABILITY: SOCIAL INSECURITY: ARE THERE ANY APPARENT SIGNS OF INJURIES/BEHAVIORS THAT COULD BE RELATED TO ABUSE/NEGLECT?: NO

## 2024-12-11 SDOH — SOCIAL STABILITY: SOCIAL INSECURITY: DO YOU FEEL UNSAFE GOING BACK TO THE PLACE WHERE YOU ARE LIVING?: NO

## 2024-12-11 SDOH — ECONOMIC STABILITY: INCOME INSECURITY: IN THE PAST 12 MONTHS HAS THE ELECTRIC, GAS, OIL, OR WATER COMPANY THREATENED TO SHUT OFF SERVICES IN YOUR HOME?: NO

## 2024-12-11 SDOH — ECONOMIC STABILITY: FOOD INSECURITY: WITHIN THE PAST 12 MONTHS, YOU WORRIED THAT YOUR FOOD WOULD RUN OUT BEFORE YOU GOT THE MONEY TO BUY MORE.: NEVER TRUE

## 2024-12-11 SDOH — SOCIAL STABILITY: SOCIAL INSECURITY: HAVE YOU HAD THOUGHTS OF HARMING ANYONE ELSE?: NO

## 2024-12-11 SDOH — SOCIAL STABILITY: SOCIAL INSECURITY: ARE YOU OR HAVE YOU BEEN THREATENED OR ABUSED PHYSICALLY, EMOTIONALLY, OR SEXUALLY BY ANYONE?: NO

## 2024-12-11 SDOH — SOCIAL STABILITY: SOCIAL INSECURITY: DO YOU FEEL ANYONE HAS EXPLOITED OR TAKEN ADVANTAGE OF YOU FINANCIALLY OR OF YOUR PERSONAL PROPERTY?: NO

## 2024-12-11 SDOH — SOCIAL STABILITY: SOCIAL INSECURITY: HAS ANYONE EVER THREATENED TO HURT YOUR FAMILY OR YOUR PETS?: NO

## 2024-12-11 SDOH — SOCIAL STABILITY: SOCIAL INSECURITY: DOES ANYONE TRY TO KEEP YOU FROM HAVING/CONTACTING OTHER FRIENDS OR DOING THINGS OUTSIDE YOUR HOME?: NO

## 2024-12-11 ASSESSMENT — LIFESTYLE VARIABLES
AUDIT-C TOTAL SCORE: 0
HOW OFTEN DO YOU HAVE 6 OR MORE DRINKS ON ONE OCCASION: NEVER
EVER FELT BAD OR GUILTY ABOUT YOUR DRINKING: NO
HOW OFTEN DO YOU HAVE A DRINK CONTAINING ALCOHOL: NEVER
AUDIT-C TOTAL SCORE: 0
TOTAL SCORE: 0
SKIP TO QUESTIONS 9-10: 1
HOW MANY STANDARD DRINKS CONTAINING ALCOHOL DO YOU HAVE ON A TYPICAL DAY: PATIENT DOES NOT DRINK
HAVE YOU EVER FELT YOU SHOULD CUT DOWN ON YOUR DRINKING: NO
HAVE PEOPLE ANNOYED YOU BY CRITICIZING YOUR DRINKING: NO
EVER HAD A DRINK FIRST THING IN THE MORNING TO STEADY YOUR NERVES TO GET RID OF A HANGOVER: NO

## 2024-12-11 ASSESSMENT — COGNITIVE AND FUNCTIONAL STATUS - GENERAL
WALKING IN HOSPITAL ROOM: A LOT
DAILY ACTIVITIY SCORE: 18
STANDING UP FROM CHAIR USING ARMS: A LOT
MOVING TO AND FROM BED TO CHAIR: A LOT
DAILY ACTIVITIY SCORE: 20
DRESSING REGULAR UPPER BODY CLOTHING: A LITTLE
MOBILITY SCORE: 13
TOILETING: A LITTLE
STANDING UP FROM CHAIR USING ARMS: A LOT
CLIMB 3 TO 5 STEPS WITH RAILING: A LOT
TOILETING: A LITTLE
DRESSING REGULAR LOWER BODY CLOTHING: A LITTLE
MOVING FROM LYING ON BACK TO SITTING ON SIDE OF FLAT BED WITH BEDRAILS: A LITTLE
CLIMB 3 TO 5 STEPS WITH RAILING: A LOT
HELP NEEDED FOR BATHING: A LITTLE
MOVING TO AND FROM BED TO CHAIR: A LOT
HELP NEEDED FOR BATHING: A LOT
TURNING FROM BACK TO SIDE WHILE IN FLAT BAD: A LOT
DRESSING REGULAR LOWER BODY CLOTHING: A LOT
PATIENT BASELINE BEDBOUND: NO
WALKING IN HOSPITAL ROOM: A LOT
TURNING FROM BACK TO SIDE WHILE IN FLAT BAD: A LOT
MOVING FROM LYING ON BACK TO SITTING ON SIDE OF FLAT BED WITH BEDRAILS: A LOT
DRESSING REGULAR UPPER BODY CLOTHING: A LITTLE
MOBILITY SCORE: 12

## 2024-12-11 ASSESSMENT — COLUMBIA-SUICIDE SEVERITY RATING SCALE - C-SSRS
6. HAVE YOU EVER DONE ANYTHING, STARTED TO DO ANYTHING, OR PREPARED TO DO ANYTHING TO END YOUR LIFE?: NO
2. HAVE YOU ACTUALLY HAD ANY THOUGHTS OF KILLING YOURSELF?: NO
1. IN THE PAST MONTH, HAVE YOU WISHED YOU WERE DEAD OR WISHED YOU COULD GO TO SLEEP AND NOT WAKE UP?: NO

## 2024-12-11 ASSESSMENT — ACTIVITIES OF DAILY LIVING (ADL)
FEEDING YOURSELF: INDEPENDENT
TOILETING: INDEPENDENT
HEARING - RIGHT EAR: FUNCTIONAL
JUDGMENT_ADEQUATE_SAFELY_COMPLETE_DAILY_ACTIVITIES: YES
DRESSING YOURSELF: INDEPENDENT
ADEQUATE_TO_COMPLETE_ADL: YES
GROOMING: INDEPENDENT
LACK_OF_TRANSPORTATION: NO
PATIENT'S MEMORY ADEQUATE TO SAFELY COMPLETE DAILY ACTIVITIES?: YES
ASSISTIVE_DEVICE: EYEGLASSES;CANE
BATHING: INDEPENDENT
HEARING - LEFT EAR: FUNCTIONAL
WALKS IN HOME: INDEPENDENT
LACK_OF_TRANSPORTATION: NO

## 2024-12-11 ASSESSMENT — PATIENT HEALTH QUESTIONNAIRE - PHQ9
SUM OF ALL RESPONSES TO PHQ9 QUESTIONS 1 & 2: 0
1. LITTLE INTEREST OR PLEASURE IN DOING THINGS: NOT AT ALL
2. FEELING DOWN, DEPRESSED OR HOPELESS: NOT AT ALL

## 2024-12-11 ASSESSMENT — PAIN - FUNCTIONAL ASSESSMENT
PAIN_FUNCTIONAL_ASSESSMENT: 0-10

## 2024-12-11 ASSESSMENT — PAIN SCALES - GENERAL
PAINLEVEL_OUTOF10: 0 - NO PAIN

## 2024-12-11 NOTE — PROGRESS NOTES
Pharmacy Medication History Review    Soni Herrera is a 89 y.o. female admitted for Stroke with cerebral ischemia (Multi). Pharmacy reviewed the patient's [id number] medications and allergies for accuracy.    The list below reflectives the updated PTA list. Please review each medication in order reconciliation for additional clarification and justification.  Prior to Admission Medications   Prescriptions Last Dose Informant Patient Reported? Taking?   acetaminophen (TylenoL) 325 mg capsule 12/10/2024 Morning Self Yes Yes   Sig: Take by mouth if needed.   amLODIPine (Norvasc) 10 mg tablet 12/10/2024 Morning Self Yes Yes   Sig: Take 1 tablet (10 mg) by mouth once daily.   aspirin 81 mg EC tablet 12/10/2024 Morning Self Yes Yes   Sig: Take 1 tablet (81 mg) by mouth once daily.   atenolol (Tenormin) 50 mg tablet 12/10/2024 Morning Self Yes Yes   Sig: Take 1 tablet (50 mg) by mouth once daily.   fish oil concentrate (Omega-3) 120-180 mg capsule 12/10/2024 Morning Self Yes Yes   Sig: Take 1 capsule (1 g) by mouth once daily in the morning.   losartan (Cozaar) 25 mg tablet 12/10/2024 Morning Self Yes Yes   Sig: Take 1 tablet (25 mg) by mouth once daily.   metFORMIN (Glucophage) 500 mg tablet 12/10/2024 Morning Self Yes Yes   Sig: Take 2 tablets (1,000 mg) by mouth once daily with breakfast.      Facility-Administered Medications: None           The list below reflectives the updated allergy list. Please review each documented allergy for additional clarification and justification.  Allergies  Reviewed by Neil Banegas RN on 12/11/2024        Severity Reactions Comments    Doxycycline Not Specified Unknown             Below are additional concerns with the patient's PTA list.      ZEKE ASH

## 2024-12-11 NOTE — PROGRESS NOTES
12/11/24 1411   Discharge Planning   Living Arrangements Alone  (May discharge to son and ashley in laws home in [facility])   Support Systems Children;Friends/neighbors   Assistance Needed A&OX4; independent with ADLs with cane at times; doesn't drive anymore; room air baseline and currently room air; PCP Jocelyn Bowens CNP   Type of Residence Private residence   Number of Stairs to Enter Residence 3   Number of Stairs Within Residence 14  (7up / 7down)   Do you have animals or pets at home? No   Who is requesting discharge planning? Provider   Home or Post Acute Services In home services;None   Expected Discharge Disposition Home H  (DC dispo pending workup and PT OT darryn. Patient denies snf need-history of [facility]. Preference is home with home care. Pt may dc to son/ashley in law home in [place])   Does the patient need discharge transport arranged? Yes   RoundTrip coordination needed? Yes   Has discharge transport been arranged? No   Financial Resource Strain   How hard is it for you to pay for the very basics like food, housing, medical care, and heating? Not hard   Housing Stability   In the last 12 months, was there a time when you were not able to pay the mortgage or rent on time? N   In the past 12 months, how many times have you moved where you were living? 0   At any time in the past 12 months, were you homeless or living in a shelter (including now)? N   Transportation Needs   In the past 12 months, has lack of transportation kept you from medical appointments or from getting medications? no   In the past 12 months, has lack of transportation kept you from meetings, work, or from getting things needed for daily living? No   Stroke Family Assessment   Stroke Family Assessment Needed Yes   Primary Caregiver/Family After Discharge Adult Child   Physical Layout of Home Two Story   Caregiver/Family can provide assistance with ADL's Yes   Caregiver/Family can provide mobility assistance for  transfers in and out of bed, chair or car Yes   Medications: Caregiver/Family can obtain prescriptions Yes   Medications: Caregiver/Family can assist with medication administration Yes   Medications: Caregiver/Family verbalizes understanding of medications Yes   Caregiver/Family agrees with discharge plan to home Yes   Caregiver/Family verbalizes capability of caring for patient at home Yes   Intensity of Service   Intensity of Service 0-30 min     12/11/2024 1415pm  Spoke with patient and patient's daughter in law bedside in ED. Patient answered all questions appropriately

## 2024-12-11 NOTE — H&P
History Of Present Illness  Soni Herrera is a 89 y.o. female with past medical history of diabetes, hypertension, hyperlipidemia, stage IIIa CKD, 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, who came to hospital secondary to fall.  Patient does not fully remember what happened.  She remembers being on the floor by her bed but was unsure how she had fallen.  She states she is unable to get up but not sure why she could not get up.  Patient lives alone and  came in today and found the patient and EMS was called.  Patient denies fever, chills, chest pain, shortness of breath, diarrhea, dysuria or edema of the lower extremities.  She does remember having an episode of vomiting this morning.  Patient denies any pain of her head.  Her son and daughter-in-law were at the bedside.  Son states that he attempted to call her last night but was unable to reach her, but this is not uncommon as she does go to bed early.  Son states he did talk to her 2 nights ago and that patient's grandson saw the patient over this past weekend.  Otherwise, 10 point review of systems is benign.     Past Medical History  Past Medical History:   Diagnosis Date    Left upper quadrant pain 04/03/2014    Abdominal pain, left upper quadrant    Pallor 04/03/2014    Pallor    Personal history of other specified conditions 03/13/2014    History of chest pain    Personal history of other specified conditions 04/03/2014    History of fatigue       Surgical History  Past Surgical History:   Procedure Laterality Date    TONSILLECTOMY  03/13/2014    Tonsillectomy    TOTAL ABDOMINAL HYSTERECTOMY W/ BILATERAL SALPINGOOPHORECTOMY  03/13/2014    Total Abdominal Hysterectomy With Removal Of Both Ovaries        Social History  She reports that she has never smoked. She has never used smokeless tobacco. She reports that she does not drink alcohol and does not use drugs.    Family History  Family History  "  Problem Relation Name Age of Onset    Heart disease Mother      Hypertension Mother      Prostate cancer Son          last june        Allergies  Doxycycline    Review of Systems  -As stated in the HPI.  Otherwise, 10 point review of systems is benign.  Physical Exam  General: Patient is alert and oriented to self, birthday, president, year, month and age.  No acute distress.  HEENT: Clear sclera.  CVS: RRR.  Lungs: CTAB.   Abdomen: Soft.  Nontender.  Bowel sounds present.  Extremities: No pitting edema bilateral ankles.  Right lower extremity, anterior and lateral proximal aspect of the with mild erythema.  Left, fourth toe with ecchymosis.  Bilateral feet are both warm and dorsalis pedis pulses present bilaterally.  Neurologic: NIH stroke score is approximately 4 (1 point for drift of the right leg, 1 point for drift of the left leg, 1 point for decreased sensation of the left arm and decrease sensation of the left leg, 1 point for mild to moderate aphasia)  Psychiatric: Cooperative.      Last Recorded Vitals  Blood pressure 149/83, pulse 68, temperature 36.5 °C (97.7 °F), temperature source Temporal, resp. rate 16, height 1.575 m (5' 2\"), weight 71.6 kg (157 lb 13.6 oz), SpO2 95%.    Relevant Results      Results for orders placed or performed during the hospital encounter of 12/11/24 (from the past 24 hours)   CBC and Auto Differential   Result Value Ref Range    WBC 23.4 (H) 4.4 - 11.3 x10*3/uL    nRBC 0.0 0.0 - 0.0 /100 WBCs    RBC 4.22 4.00 - 5.20 x10*6/uL    Hemoglobin 13.6 12.0 - 16.0 g/dL    Hematocrit 40.9 36.0 - 46.0 %    MCV 97 80 - 100 fL    MCH 32.2 26.0 - 34.0 pg    MCHC 33.3 32.0 - 36.0 g/dL    RDW 13.3 11.5 - 14.5 %    Platelets 454 (H) 150 - 450 x10*3/uL    Neutrophils % 84.8 40.0 - 80.0 %    Immature Granulocytes %, Automated 0.6 0.0 - 0.9 %    Lymphocytes % 9.2 13.0 - 44.0 %    Monocytes % 4.8 2.0 - 10.0 %    Eosinophils % 0.2 0.0 - 6.0 %    Basophils % 0.4 0.0 - 2.0 %    Neutrophils Absolute " 19.83 (H) 1.60 - 5.50 x10*3/uL    Immature Granulocytes Absolute, Automated 0.14 0.00 - 0.50 x10*3/uL    Lymphocytes Absolute 2.14 0.80 - 3.00 x10*3/uL    Monocytes Absolute 1.12 (H) 0.05 - 0.80 x10*3/uL    Eosinophils Absolute 0.05 0.00 - 0.40 x10*3/uL    Basophils Absolute 0.10 0.00 - 0.10 x10*3/uL   Magnesium   Result Value Ref Range    Magnesium 1.57 (L) 1.60 - 2.40 mg/dL   Comprehensive metabolic panel   Result Value Ref Range    Glucose 282 (H) 74 - 99 mg/dL    Sodium 138 136 - 145 mmol/L    Potassium 3.7 3.5 - 5.3 mmol/L    Chloride 101 98 - 107 mmol/L    Bicarbonate 25 21 - 32 mmol/L    Anion Gap 16 10 - 20 mmol/L    Urea Nitrogen 28 (H) 6 - 23 mg/dL    Creatinine 0.87 0.50 - 1.05 mg/dL    eGFR 64 >60 mL/min/1.73m*2    Calcium 9.2 8.6 - 10.3 mg/dL    Albumin 3.6 3.4 - 5.0 g/dL    Alkaline Phosphatase 76 33 - 136 U/L    Total Protein 6.9 6.4 - 8.2 g/dL    AST 24 9 - 39 U/L    Bilirubin, Total 1.5 (H) 0.0 - 1.2 mg/dL    ALT 16 7 - 45 U/L   Lactate   Result Value Ref Range    Lactate 1.6 0.4 - 2.0 mmol/L   Protime-INR   Result Value Ref Range    Protime 12.2 9.8 - 12.8 seconds    INR 1.1 0.9 - 1.1   Troponin I, High Sensitivity   Result Value Ref Range    Troponin I, High Sensitivity 226 (HH) 0 - 13 ng/L   B-Type Natriuretic Peptide   Result Value Ref Range     (H) 0 - 99 pg/mL   Blood Gas Venous Full Panel   Result Value Ref Range    POCT pH, Venous 7.35 7.33 - 7.43 pH    POCT pCO2, Venous 45 41 - 51 mm Hg    POCT pO2, Venous 18 (L) 35 - 45 mm Hg    POCT SO2, Venous 24 (L) 45 - 75 %    POCT Oxy Hemoglobin, Venous 23.5 (L) 45.0 - 75.0 %    POCT Hematocrit Calculated, Venous 52.0 (H) 36.0 - 46.0 %    POCT Sodium, Venous 134 (L) 136 - 145 mmol/L    POCT Potassium, Venous 3.6 3.5 - 5.3 mmol/L    POCT Chloride, Venous 101 98 - 107 mmol/L    POCT Ionized Calicum, Venous 1.25 1.10 - 1.33 mmol/L    POCT Glucose, Venous 314 (H) 74 - 99 mg/dL    POCT Lactate, Venous 2.0 0.4 - 2.0 mmol/L    POCT Base Excess,  Venous -1.2 -2.0 - 3.0 mmol/L    POCT HCO3 Calculated, Venous 24.8 22.0 - 26.0 mmol/L    POCT Hemoglobin, Venous 17.3 (H) 12.0 - 16.0 g/dL    POCT Anion Gap, Venous 12.0 10.0 - 25.0 mmol/L    Patient Temperature      FiO2 21 %   Type And Screen   Result Value Ref Range    ABO TYPE A     Rh TYPE POS     ANTIBODY SCREEN NEG    Creatine Kinase   Result Value Ref Range    Creatine Kinase 432 (H) 0 - 215 U/L   Sars-CoV-2 PCR   Result Value Ref Range    Coronavirus 2019, PCR Not Detected Not Detected   Influenza A, and B PCR   Result Value Ref Range    Flu A Result Not Detected Not Detected    Flu B Result Not Detected Not Detected   Urinalysis with Reflex Culture and Microscopic   Result Value Ref Range    Color, Urine Light-Orange (N) Light-Yellow, Yellow, Dark-Yellow    Appearance, Urine Ex.Turbid (N) Clear    Specific Gravity, Urine 1.031 1.005 - 1.035    pH, Urine 6.0 5.0, 5.5, 6.0, 6.5, 7.0, 7.5, 8.0    Protein, Urine 600 (3+) (A) NEGATIVE, 10 (TRACE), 20 (TRACE) mg/dL    Glucose, Urine Normal Normal mg/dL    Blood, Urine 0.2 (2+) (A) NEGATIVE    Ketones, Urine 10 (1+) (A) NEGATIVE mg/dL    Bilirubin, Urine NEGATIVE NEGATIVE    Urobilinogen, Urine 2 (1+) (A) Normal mg/dL    Nitrite, Urine NEGATIVE NEGATIVE    Leukocyte Esterase, Urine 25 Mahesh/µL (A) NEGATIVE   Troponin I, High Sensitivity   Result Value Ref Range    Troponin I, High Sensitivity 224 (HH) 0 - 13 ng/L   Microscopic Only, Urine   Result Value Ref Range    WBC, Urine 21-50 (A) 1-5, NONE /HPF    RBC, Urine 11-20 (A) NONE, 1-2, 3-5 /HPF    Squamous Epithelial Cells, Urine 10-25 (FEW) Reference range not established. /HPF    Bacteria, Urine 4+ (A) NONE SEEN /HPF   Lipid Panel   Result Value Ref Range    Cholesterol 197 0 - 199 mg/dL    HDL-Cholesterol 51.3 mg/dL    Cholesterol/HDL Ratio 3.8     LDL Calculated 113 (H) <=99 mg/dL    VLDL 33 0 - 40 mg/dL    Triglycerides 165 (H) 0 - 149 mg/dL    Non HDL Cholesterol 146 0 - 149 mg/dL   B-Type Natriuretic Peptide    Result Value Ref Range     (H) 0 - 99 pg/mL   POCT GLUCOSE   Result Value Ref Range    POCT Glucose 230 (H) 74 - 99 mg/dL        CT thoracic spine wo IV contrast    Result Date: 12/11/2024  No evidence for a fracture on this exam. Mild thoracic spondylosis.   MACRO: none   Signed by: Von Nettles 12/11/2024 1:21 PM Dictation workstation:   [id number]    CT lumbar spine wo IV contrast    Result Date: 12/11/2024  1. No acute fractures are identified. There is chronic deformity of L4 as detailed above, unchanged compared to the study from 02/13/2024. 2. L3 through S1 spondylosis is present as detailed above. Canal and foraminal assessment is limited from L3 through S1 due to patient body habitus.   MACRO: none   Signed by: Von Nettles 12/11/2024 1:19 PM Dictation workstation:   [id number]    CT chest abdomen pelvis wo IV contrast    Result Date: 12/11/2024  1. Herniation of a small amount of retroperitoneal fat into the lower left hemithorax through a probable chronic defect within left hemidiaphragm with associated left basilar atelectasis noted. 2. Cholelithiasis. 3. Status post splenectomy. 4. Hypodense lesion within the neck of the pancreas better depicted on the previous scan with contrast. This is probably unchanged. 5. Large herniation through the anterolateral abdominal wall on the left. No evidence for incarceration. 6. Extensive colonic diverticulosis without CT evidence for diverticulitis. 7. Status post hysterectomy. 8. Remote wedge deformity of L4.     MACRO: none   Signed by: Von Nettles 12/11/2024 1:12 PM Dictation workstation:   [id number]    CT cervical spine wo IV contrast    Result Date: 12/11/2024  No evidence for a fracture on this exam. Cervical spondylosis as described.   MACRO: none   Signed by: Von Nettles 12/11/2024 1:00 PM Dictation workstation:   [id number]    CT head wo IV contrast    Result Date: 12/11/2024  Exam demonstrates findings consistent with an acute left  anterior cerebral artery infarct. No evidence for hemorrhage. There is only regional mass effect upon the adjacent sulci with no midline shift. Age-related atrophy is present.     MACRO: Von Nettles communicated these findings  by EPIC secure chat with JAKOB CHOU on 12/11/2024 at 12:53 pm.  (**-RCF-**) Findings:  See findings.     Signed by: Von Nettles 12/11/2024 12:53 PM Dictation workstation:   [id number]         Assessment/Plan   Assessment & Plan  Stroke with cerebral ischemia (Multi)    Soni Herrera is a 89 y.o. 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     Acute stroke  -CT of the head with acute left anterior cerebral artery infarct with no evidence for hemorrhage.  -ER provider spoke with Dr. Bill, neurology at [facility], who did not recommend any acute interventions.  -Will place on aspirin, atorvastatin 80 mg.  -Check echo, MRI/MRI of the brain, MRA of the neck.  -Check lipid panel, HbA1c.  -Consult neurology.  -NIH stroke score is approximately 4 at the time my exam today as stated above.  -Monitor.  -Patient's son and daughter-in-law are at the bedside and all their questions were answered.    UTI  -Continue IV Rocephin that was started in the ER.  -Follow urine culture.   -Monitor.    Mildly elevated CPK  -Likely secondary to fall and patient being on the ground.  -Patient given IV fluids in the ER.  Will continue with maintenance IV fluids and monitor.    Elevated troponin  -Likely secondary to demand ischemia.  Patient denies chest pain but is not a good historian of the last few days.  -I reviewed patient's EKG which reveals normal sinus rhythm with no acute ST-T changes  -Check echo.  -Consult cardiology.    Right lower extremity skin changes  -Likely  secondary to fall.  Patient being placed on Rocephin for UTI as well.  Will continue to monitor.    Hypomagnesemia  -Replace and monitor.    Lumbar spine deformity  -L4 chronic deformity found on CT scan.  Will monitor and follow-up with PCP as an outpatient.    Diabetes  -Holding home medication of metformin.  -Will place on SSI and check an HbA1c and monitor.    Hypertension  -Holding home meds of amlodipine, losartan and atenolol to allow for permissive hypertension in the setting of acute CVA.  -Monitor.    History of diffuse large B-cell lymphoma  -Recommend follow-up with her regular oncologist as an outpatient and was seen by Dr. Means on 2/16/24.     Hyperlipidemia  -Will place on a atorvastatin 80 mg daily due to acute CVA.    Stage IIIa CKD  -Renal function is around baseline.  Monitor.    DVT prophylaxis  -Lovenox subcu.      Dorian Barry DO

## 2024-12-11 NOTE — ED PROVIDER NOTES
"HPI   Chief Complaint   Patient presents with    Fall     Pt with CO fall this am per pt.   found her on the ground and states that she has not been over for a week.  Pt was soiled in urine but no stool noted.  Pts bathroom was \"tore up\" according to EMS.  Pt has a large Red area on her R hip.         CC: Altered mental state, confusion, unwitnessed fall   HPI:   89-year-old female brought to ED via EMS EMS reported patient was found lying next to her bed next to emesis patient does not remember falling she does not know how long she was on the floor, apparently she has home health care unsure of how often, patient is alert and oriented x 2, confused about events over the last 24 hours, she is complaining of bilateral hip pain, she is denying any chest pain or abdominal pain, she is denying any shortness of breath, patient is a poor historian, per her EMR she does have a history of lymphoma in remission, s/p mini-R-CHOP x 6 and s/p splenectomy Sep 2014, hypertension hyperlipidemia, non-insulin-dependent type 2 diabetes.    Additional Limitations to History:   External Records Reviewed: I reviewed recent and relevant outside records including   History Obtained From:     Past Medical History: Per HPI  Medications: Reviewed in EMR and with patient  Allergies:  Reviewed in EMR  Past Surgical History:   Social History:     ------------------------------------------------------------------------------------------------------  Physical Exam:  --Vital signs reviewed in nursing triage note, EMR flow sheets, and at patient's bedside  GEN:  A&Ox2, no acute distress, appears comfortable.    EYES: EOMI, non-injected sclera.  ENT: Moist mucous membranes, no apparent injuries or lesions.   CARDIO: Normal rate and regular rhythm. No murmurs, rubs, or gallops.  2+ equal pulses of the distal extremities.   PULM: Clear to auscultation bilaterally. No rales, rhonchi, or wheezes. Good symmetric chest expansion.  GI: Soft, " non-tender, non-distended.  Large left abdominal hernia, no rebound tenderness or guarding.  SKIN: Warm and dry, no rashes or lesions.  MSK: Lower extremities range of motion is limited, bilateral weakness without contractures.  Upper extremities range of motion intact bilaterally  EXT: Nonpitting lower peripheral edema, no contusions, or wounds.   NEURO:  Sensation to light touch intact and equal bilaterally in upper and lower extremities.    PSYCH: Appropriate mood and behavior, converses and responds appropriately during exam.  -------------------------------------------------------------------------------------------------------        Differential Diagnoses Considered:   Chronic Medical Conditions Significantly Affecting Care:   Diagnostic testing considered: [PERC, D-Dimer, PECARN, etc.]    - EKG interpreted by myself normal sinus rhythm ventricular rate 66 KY interval 158 normal QRS duration no prolonged QT/QTc no obvious ST elevation, depression or acute ischemic findings.  - I independently interpreted: [CXR, CT, POCUS, etc. including your interpretation]  - Labs notable for     Escalation of Care: Appropriate for   Social Determinants of Health Significantly Affecting Care: [Homelessness, lacking transportation, uninsured, unable to afford medications]  Prescription Drug Consideration: [Antibiotics, antivirals, pain medications, etc.]  Discussion of Management with Other Providers:  I discussed the patient/results with: [admitting team, consultant, radiologist, social work, EPAT, case management, PT/OT, RT, PCP, etc.]      Azam Redman PA-C              Patient History   Past Medical History:   Diagnosis Date    Left upper quadrant pain 04/03/2014    Abdominal pain, left upper quadrant    Pallor 04/03/2014    Pallor    Personal history of other specified conditions 03/13/2014    History of chest pain    Personal history of other specified conditions 04/03/2014    History of fatigue     Past Surgical  History:   Procedure Laterality Date    TONSILLECTOMY  03/13/2014    Tonsillectomy    TOTAL ABDOMINAL HYSTERECTOMY W/ BILATERAL SALPINGOOPHORECTOMY  03/13/2014    Total Abdominal Hysterectomy With Removal Of Both Ovaries     Family History   Problem Relation Name Age of Onset    Heart disease Mother      Hypertension Mother      Prostate cancer Son          last june     Social History     Tobacco Use    Smoking status: Never    Smokeless tobacco: Never   Vaping Use    Vaping status: Never Used   Substance Use Topics    Alcohol use: Never    Drug use: Never       Physical Exam   ED Triage Vitals [12/11/24 1121]   Temperature Heart Rate Respirations BP   36.6 °C (97.9 °F) 67 18 148/56      Pulse Ox Temp src Heart Rate Source Patient Position   96 % -- -- --      BP Location FiO2 (%)     -- --       Physical Exam  Neurological:      Mental Status: She is alert.      Cranial Nerves: No dysarthria or facial asymmetry.      Sensory: Sensation is intact.           ED Course & MDM   Diagnoses as of 12/13/24 0758   Fall, initial encounter   Elevated troponin   Acute ischemic left ANASTACIO stroke (Multi)   Acute cystitis without hematuria   Stroke with cerebral ischemia (Multi)                 No data recorded     [place] Coma Scale Score: 14 (12/11/24 1127 : Neil Banegas RN)                           Medical Decision Making  89-year-old female with unwitnessed fall, unknown downtime, confusion in the setting of acute ischemic left ANASTACIO stroke, discussed with brain attack attending, TNK is not warranted, patient does not warrant transfer to [facility], she is hemodynamically stable, alert and oriented to person and place however confused about events, patient also found to have urinary tract infection, she appears nontoxic and well-hydrated, she does have elevated troponins and leukocytosis, possibly mild rhabdomyolysis with CK at 432 remaining electrolytes appear unremarkable she was given Rocephin and lactated Ringer's, patient will  be admitted for stroke workup        Procedure  Procedures     Azam Redamn PA-C  12/11/24 1133       Azam Redman PA-C  12/11/24 1304       Azam Redman PA-C  12/13/24 0825

## 2024-12-11 NOTE — ED PROVIDER NOTES
The patient was seen by the midlevel/resident.  I have personally saw the patient and made/approved the management plan and take responsibility for the patient management.  I reviewed the EKG's (when done) and agree with the interpretation.  I have seen and examined the patient; agree with the workup, evaluation, MDM, and diagnosis.  The care plan has been discussed with the midlevel/resident; I have reviewed the note and agree with the documented findings.   Patient presented after a fall that was unwitnessed.  She was found with some vomiting as well.  Worked up in ED with CT scan and labs.  She is found to have elevated troponin slight elevated CK.  Plan is to hospitalize and further evaluate and treat.  Patient is agreeable with plan. Found to have a stroke on CT. Talked to BAT and can keep at [facility].     Diagnoses as of 12/11/24 1510   Fall, initial encounter   Elevated troponin   Acute ischemic left ANASTACIO stroke (Multi)   Acute cystitis without hematuria   Stroke with cerebral ischemia (Multi)     MD Leoncio Whitney MD  12/11/24 1510

## 2024-12-11 NOTE — CARE PLAN
"The patient's goals for the shift include  \"go home\"    The clinical goals for the shift include  pt will show no new neuro deficits during shift      Problem: General Stroke  Goal: Establish a mutual long term goal with patient by discharge  Outcome: Progressing     Problem: General Stroke  Goal: Demonstrate improvement in neurological exam throughout the shift  Outcome: Progressing     Problem: General Stroke  Goal: Maintain BP within ordered limits throughout shift  Outcome: Progressing     Problem: General Stroke  Goal: Participate in treatment (ie., meds, therapy) throughout shift  Outcome: Progressing       "

## 2024-12-12 ENCOUNTER — APPOINTMENT (OUTPATIENT)
Dept: CARDIOLOGY | Facility: HOSPITAL | Age: 89
DRG: 065 | End: 2024-12-12
Payer: MEDICARE

## 2024-12-12 ENCOUNTER — APPOINTMENT (OUTPATIENT)
Dept: RADIOLOGY | Facility: HOSPITAL | Age: 89
DRG: 065 | End: 2024-12-12
Payer: MEDICARE

## 2024-12-12 LAB
ALBUMIN SERPL BCP-MCNC: 3.1 G/DL (ref 3.4–5)
ANION GAP SERPL CALC-SCNC: 17 MMOL/L (ref 10–20)
AORTIC VALVE MEAN GRADIENT: 13 MMHG
AORTIC VALVE PEAK VELOCITY: 2.47 M/S
ATRIAL RATE: 66 BPM
AV PEAK GRADIENT: 24 MMHG
AVA (PEAK VEL): 1.67 CM2
AVA (VTI): 1.92 CM2
BUN SERPL-MCNC: 48 MG/DL (ref 6–23)
CALCIUM SERPL-MCNC: 8.2 MG/DL (ref 8.6–10.3)
CHLORIDE SERPL-SCNC: 100 MMOL/L (ref 98–107)
CK SERPL-CCNC: 155 U/L (ref 0–215)
CO2 SERPL-SCNC: 23 MMOL/L (ref 21–32)
CREAT SERPL-MCNC: 1.04 MG/DL (ref 0.5–1.05)
EGFRCR SERPLBLD CKD-EPI 2021: 52 ML/MIN/1.73M*2
EJECTION FRACTION APICAL 4 CHAMBER: 67.7
EJECTION FRACTION: 63 %
ERYTHROCYTE [DISTWIDTH] IN BLOOD BY AUTOMATED COUNT: 13.7 % (ref 11.5–14.5)
EST. AVERAGE GLUCOSE BLD GHB EST-MCNC: 171 MG/DL
EST. AVERAGE GLUCOSE BLD GHB EST-MCNC: 174 MG/DL
GLUCOSE BLD MANUAL STRIP-MCNC: 195 MG/DL (ref 74–99)
GLUCOSE BLD MANUAL STRIP-MCNC: 233 MG/DL (ref 74–99)
GLUCOSE BLD MANUAL STRIP-MCNC: 257 MG/DL (ref 74–99)
GLUCOSE BLD MANUAL STRIP-MCNC: 275 MG/DL (ref 74–99)
GLUCOSE BLD MANUAL STRIP-MCNC: 279 MG/DL (ref 74–99)
GLUCOSE SERPL-MCNC: 299 MG/DL (ref 74–99)
HBA1C MFR BLD: 7.6 %
HBA1C MFR BLD: 7.7 %
HCT VFR BLD AUTO: 37.6 % (ref 36–46)
HGB BLD-MCNC: 12.1 G/DL (ref 12–16)
HOLD SPECIMEN: NORMAL
LEFT ATRIUM VOLUME AREA LENGTH INDEX BSA: 26.6 ML/M2
LEFT VENTRICLE INTERNAL DIMENSION DIASTOLE: 3.62 CM (ref 3.5–6)
LEFT VENTRICULAR OUTFLOW TRACT DIAMETER: 1.96 CM
MAGNESIUM SERPL-MCNC: 1.99 MG/DL (ref 1.6–2.4)
MCH RBC QN AUTO: 31.9 PG (ref 26–34)
MCHC RBC AUTO-ENTMCNC: 32.2 G/DL (ref 32–36)
MCV RBC AUTO: 99 FL (ref 80–100)
MITRAL VALVE E/A RATIO: 0.7
NRBC BLD-RTO: 0.1 /100 WBCS (ref 0–0)
P AXIS: 84 DEGREES
P OFFSET: 198 MS
P ONSET: 148 MS
PHOSPHATE SERPL-MCNC: 4.1 MG/DL (ref 2.5–4.9)
PLATELET # BLD AUTO: 417 X10*3/UL (ref 150–450)
POTASSIUM SERPL-SCNC: 3.4 MMOL/L (ref 3.5–5.3)
PR INTERVAL: 158 MS
Q ONSET: 227 MS
QRS COUNT: 11 BEATS
QRS DURATION: 82 MS
QT INTERVAL: 462 MS
QTC CALCULATION(BAZETT): 484 MS
QTC FREDERICIA: 477 MS
R AXIS: -23 DEGREES
RBC # BLD AUTO: 3.79 X10*6/UL (ref 4–5.2)
RIGHT VENTRICLE FREE WALL PEAK S': 12 CM/S
RIGHT VENTRICLE PEAK SYSTOLIC PRESSURE: 44.6 MMHG
SODIUM SERPL-SCNC: 137 MMOL/L (ref 136–145)
T AXIS: 64 DEGREES
T OFFSET: 458 MS
TRICUSPID ANNULAR PLANE SYSTOLIC EXCURSION: 2.2 CM
VENTRICULAR RATE: 66 BPM
WBC # BLD AUTO: 17.3 X10*3/UL (ref 4.4–11.3)

## 2024-12-12 PROCEDURE — 83735 ASSAY OF MAGNESIUM: CPT | Performed by: INTERNAL MEDICINE

## 2024-12-12 PROCEDURE — 99232 SBSQ HOSP IP/OBS MODERATE 35: CPT | Performed by: INTERNAL MEDICINE

## 2024-12-12 PROCEDURE — 99223 1ST HOSP IP/OBS HIGH 75: CPT | Performed by: STUDENT IN AN ORGANIZED HEALTH CARE EDUCATION/TRAINING PROGRAM

## 2024-12-12 PROCEDURE — 36415 COLL VENOUS BLD VENIPUNCTURE: CPT | Performed by: INTERNAL MEDICINE

## 2024-12-12 PROCEDURE — 93306 TTE W/DOPPLER COMPLETE: CPT

## 2024-12-12 PROCEDURE — 2500000001 HC RX 250 WO HCPCS SELF ADMINISTERED DRUGS (ALT 637 FOR MEDICARE OP): Performed by: INTERNAL MEDICINE

## 2024-12-12 PROCEDURE — 82550 ASSAY OF CK (CPK): CPT | Performed by: INTERNAL MEDICINE

## 2024-12-12 PROCEDURE — 2500000002 HC RX 250 W HCPCS SELF ADMINISTERED DRUGS (ALT 637 FOR MEDICARE OP, ALT 636 FOR OP/ED): Performed by: INTERNAL MEDICINE

## 2024-12-12 PROCEDURE — 82947 ASSAY GLUCOSE BLOOD QUANT: CPT

## 2024-12-12 PROCEDURE — 93306 TTE W/DOPPLER COMPLETE: CPT | Performed by: INTERNAL MEDICINE

## 2024-12-12 PROCEDURE — 2500000004 HC RX 250 GENERAL PHARMACY W/ HCPCS (ALT 636 FOR OP/ED): Performed by: INTERNAL MEDICINE

## 2024-12-12 PROCEDURE — 70450 CT HEAD/BRAIN W/O DYE: CPT

## 2024-12-12 PROCEDURE — 70450 CT HEAD/BRAIN W/O DYE: CPT | Performed by: RADIOLOGY

## 2024-12-12 PROCEDURE — 1200000002 HC GENERAL ROOM WITH TELEMETRY DAILY

## 2024-12-12 PROCEDURE — 85027 COMPLETE CBC AUTOMATED: CPT | Performed by: INTERNAL MEDICINE

## 2024-12-12 PROCEDURE — 80069 RENAL FUNCTION PANEL: CPT | Performed by: INTERNAL MEDICINE

## 2024-12-12 RX ORDER — POTASSIUM CHLORIDE 1.5 G/1.58G
40 POWDER, FOR SOLUTION ORAL ONCE
Status: COMPLETED | OUTPATIENT
Start: 2024-12-12 | End: 2024-12-12

## 2024-12-12 ASSESSMENT — PAIN SCALES - GENERAL
PAINLEVEL_OUTOF10: 0 - NO PAIN
PAINLEVEL_OUTOF10: 10 - WORST POSSIBLE PAIN
PAINLEVEL_OUTOF10: 0 - NO PAIN

## 2024-12-12 ASSESSMENT — COGNITIVE AND FUNCTIONAL STATUS - GENERAL
DAILY ACTIVITIY SCORE: 15
HELP NEEDED FOR BATHING: A LOT
CLIMB 3 TO 5 STEPS WITH RAILING: A LOT
MOBILITY SCORE: 12
MOVING FROM LYING ON BACK TO SITTING ON SIDE OF FLAT BED WITH BEDRAILS: A LOT
STANDING UP FROM CHAIR USING ARMS: A LOT
TURNING FROM BACK TO SIDE WHILE IN FLAT BAD: A LOT
MOVING TO AND FROM BED TO CHAIR: A LOT
DRESSING REGULAR LOWER BODY CLOTHING: A LOT
TOILETING: A LOT
WALKING IN HOSPITAL ROOM: A LOT
DRESSING REGULAR UPPER BODY CLOTHING: A LOT
PERSONAL GROOMING: A LITTLE

## 2024-12-12 ASSESSMENT — PAIN DESCRIPTION - LOCATION: LOCATION: HIP

## 2024-12-12 ASSESSMENT — PAIN - FUNCTIONAL ASSESSMENT: PAIN_FUNCTIONAL_ASSESSMENT: 0-10

## 2024-12-12 NOTE — PROGRESS NOTES
"  Subjective    Patient denies chest pain, shortness of breath, nausea, vomiting diarrhea.    Objective    Vitals  Visit Vitals  /74 (BP Location: Right arm, Patient Position: Sitting)   Pulse 51   Temp 36.8 °C (98.2 °F) (Temporal)   Resp 16   Ht 1.575 m (5' 2\")   Wt 71.6 kg (157 lb 13.6 oz)   SpO2 95%   BMI 28.87 kg/m²   Smoking Status Never   BSA 1.77 m²       Physical Exam   General: Patient is alert and oriented to self, bday, president, yr, month and age.  NAD.  HEENT: Clear sclera.  CVS: RRR.  Lungs: CTAB.   Abdomen: Soft.  NT. +BS.   Extremities: No pitting edema bilat ankles.  Right lower extremity, anterior and lateral proximal aspect of the with mild ecchymosis.  Left, second toe with ecchymosis.    Neurologic: NIH stroke score is approximately 3. (2 points for right leg weakness; 1 point for decreased sensation left arm and left leg).  Patient does have some mild weakness of the right arm as compared to the left arm but no significant drift at the time my exam.  Psychiatric: Cooperative.     IOs    Intake/Output Summary (Last 24 hours) at 12/12/2024 1542  Last data filed at 12/12/2024 1126  Gross per 24 hour   Intake 1002.5 ml   Output 300 ml   Net 702.5 ml       Labs:   Results from last 72 hours   Lab Units 12/11/24  1134   SODIUM mmol/L 138   POTASSIUM mmol/L 3.7   CHLORIDE mmol/L 101   CO2 mmol/L 25   BUN mg/dL 28*   CREATININE mg/dL 0.87   GLUCOSE mg/dL 282*   CALCIUM mg/dL 9.2   ANION GAP mmol/L 16   EGFR mL/min/1.73m*2 64      Results from last 72 hours   Lab Units 12/11/24  1134   WBC AUTO x10*3/uL 23.4*   HEMOGLOBIN g/dL 13.6   HEMATOCRIT % 40.9   PLATELETS AUTO x10*3/uL 454*   NEUTROS PCT AUTO % 84.8   LYMPHS PCT AUTO % 9.2   MONOS PCT AUTO % 4.8   EOS PCT AUTO % 0.2      Lab Results   Component Value Date    CALCIUM 9.2 12/11/2024      No results found for: \"CRP\"   [unfilled]       Images  CT brain attack head wo IV contrast  Narrative: Interpreted By:  Orville Esquivel,   STUDY:  CT " BRAIN ATTACK HEAD WO IV CONTRAST;  12/12/2024 10:57 am      INDICATION:  Signs/Symptoms:acute stroke.      COMPARISON:  Comparison studies from 12/11/2024.. Correlation with MRI from  12/11/2024.      ACCESSION NUMBER(S):  [id number]      ORDERING CLINICIAN:  KAL WILKINSON      TECHNIQUE:  Routine axial images were obtained from the skull base through the  vertex.  Sagittal and coronal reconstruction images were generated.  Brain, subdural, and bone windows were reviewed. N/A   N/A      FINDINGS:  INTRACRANIAL:  Mild-to-moderate prominence of ventricles and sulci. There is  mild-to-moderate patchy hypodensity throughout the deep  periventricular white matter. Small old lacunar infarct in the right  cerebellum. There is a localized grossly stable area of hypodensity  in the mid left parasagittal parietal white matter, consistent with  acute or subacute nonhemorrhagic infarct. There is no acute blood  density in the brain in this exam and there is no acute extra-axial  hematoma either in the current study. No midline shift. No  destructive bone lesion. No depressed skull fracture. Skullbase  arterial calcifications in the carotid siphons.      EXTRACRANIAL:  Visualized paranasal sinuses were clear.  Visualized mastoid air cells were clear.      Impression: Findings consistent with stable small acute to subacute  nonhemorrhagic infarct in the parasagittal mid left parietal white  matter just above the body of the left lateral ventricle.      No acute intracranial bleed based on today's exam. No midline shift.      Mild-to-moderate volume loss.      Mild-to-moderate chronic white matter ischemic disease in the deep  periventricular regions.      MACRO:  Orville Esquivel discussed the significance and urgency of this critical  finding by epic secure chat with  KAL WILKINSON on 12/12/2024 at 11:11  am.  (**-RCF-**) Findings:  See findings.      Signed by: Orville Esquivel 12/12/2024 11:11 AM  Dictation workstation:    [id number]  ECG 12 lead  Normal sinus rhythm  Nonspecific ST and T wave abnormality  QTcB >= 480 msec  Abnormal ECG  When compared with ECG of 27-AUG-2014 13:17,  No significant change was found      Meds  Scheduled medications  [Held by provider] amLODIPine, 10 mg, oral, Daily  aspirin, 81 mg, oral, Daily  [Held by provider] atenolol, 50 mg, oral, Daily  atorvastatin, 80 mg, oral, Nightly  cefTRIAXone, 1 g, intravenous, q24h  [Held by provider] enoxaparin, 40 mg, subcutaneous, q24h  insulin lispro, 0-10 Units, subcutaneous, TID AC  [Held by provider] losartan, 25 mg, oral, Daily  perflutren lipid microspheres, 0.5-10 mL of dilution, intravenous, Once in imaging  perflutren protein A microsphere, 0.5 mL, intravenous, Once in imaging  sulfur hexafluoride microsphr, 2 mL, intravenous, Once in imaging      Continuous medications  lactated Ringer's, 50 mL/hr, Last Rate: 50 mL/hr (12/12/24 1126)      PRN medications  PRN medications: acetaminophen **OR** acetaminophen **OR** acetaminophen, dextrose, dextrose, glucagon, glucagon, hydrALAZINE **FOLLOWED BY** [START ON 12/13/2024] hydrALAZINE, labetaloL, oxygen     Assessment and Plan    Soni Herrera is a 89 y.o. 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      Acute stroke  -CT of the head with acute left anterior cerebral artery infarct with no evidence for hemorrhage.  -ER provider spoke with Dr. Bill, neurology at [facility], who did not recommend any acute interventions.  -MRI of the brain with subacute ischemic infarct in the left anterior cerebral artery territory along the left parasagittal frontal lobe associated with mild petechial cortical hemorrhage.   -MRA of the brain and neck were also performed.  -Echo is pending.  -Patient  had an episode of increased weakness of one of the extremities today as well.  Repeat CT scan was performed which revealed stable small acute to subacute nonhemorrhagic infarct in the parasagittal mid left parietal white matter just above the body of the left lateral ventricle. Dr. Ruiz, neurologist, was also made aware of the CT scan findings and I discussed the case with him as well.  -Neurology recommending to switch from aspirin 81 mg daily, which patient reports she was taking prior to admission, to Plavix 75 mg daily.  Neurology recommends atorvastatin 80 mg nightly and Holter monitor upon discharge.  -Echo is pending.  -PT/OT is pending.  -Allow for permissive hypertension.  -Monitor.     UTI  -Continue IV Rocephin.  -Follow urine culture.   -Monitor.     Mildly elevated CPK  -Likely secondary to fall and patient being on the ground.  -Resolved.     Elevated troponin  -Likely secondary to demand ischemia.  Patient denies chest pain but is not a good historian of the last few days.  -I reviewed patient's EKG which reveals normal sinus rhythm with no acute ST-T changes  -Echo is pending.  -Cardiology following and patient will need further cardiac ambulatory monitoring on discharge.      Right lower extremity skin changes  -Likely secondary to fall.  Patient on Rocephin for UTI as well.  On exam today, skin is less red and likely secondary to bruising.  Will continue to monitor.       Hypomagnesemia  -Monitor and repeat labs today.     Lumbar spine deformity  -L4 chronic deformity found on CT scan.  Will monitor and follow-up with PCP as an outpatient.     Diabetes  -Holding home medication of metformin.  -HbA1c was 7.7 on 12/11/2024.  -Continue SSI and monitor.     Hypertension  -Holding home meds of amlodipine, losartan and atenolol to allow for permissive hypertension in the setting of acute CVA.  -Monitor.     History of diffuse large B-cell lymphoma  -Recommend follow-up with her regular oncologist as an  outpatient and was seen by Dr. Means on 2/16/24.      Hyperlipidemia  -Placed patient on atorvastatin 80 mg daily due to acute CVA.     Stage IIIa CKD  -Renal function is around baseline.  Monitor.     DVT prophylaxis  -SCDs and holding Lovenox subcu in the setting of petechial hemorrhages on the brain on MRI.

## 2024-12-12 NOTE — PROGRESS NOTES
Physical Therapy                 Therapy Communication Note    Patient Name: Soni Herrera  MRN: [id number]  Department: [address]  Room: [address] [place], [place] [address]  Today's Date: 12/12/2024     Discipline: Physical Therapy    Missed Visit Reason: Missed Visit Reason: Other (Comment) (Pt is just finishing working with neurologist and is being place on the bed pan. No evaluation as a result.)    Missed Time: Attempt    Comment:

## 2024-12-12 NOTE — PROGRESS NOTES
Physical Therapy                 Therapy Communication Note    Patient Name: Soni Herrera  MRN: [id number]  Department: [facility]  Room: 235/[facility]  Today's Date: 12/12/2024     Discipline: Physical Therapy    Missed Visit Reason: Missed Visit Reason: Patient placed on medical hold (Spoke with Pt's RN to clear Pt for PT after earlier stroke alert. RN states CT scan shows no new findings. RN states that Pt is very sleepy and not very responsive. RN is in agreement with PT to hold therapies and allow the Pt to rest today. No eval.)    Missed Time: Attempt    Comment:

## 2024-12-12 NOTE — CONSULTS
Reason For Consult  Elevated troponin, acute stroke     History Of Present Illness  Soni Herrera is a 89 y.o. 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 Pt is unable to recall events prior to the fall. She reports on Monday night, she experienced a fall. She is unsure of the preceding events, explaining, she woke up on the floor unable to orient herself to the location explaining her surroundings looked different. She states she was unable to stand so she laid on the floor throughout the night and all of Tuesday. She states Wednesday when her aide arrived, she called EMS and was brought to [facility] for further workup. Pt reports she is compliant with her medications and denies any CP, SOB, palpitations, fever, chills.     Past Medical History  She has a past medical history of Left upper quadrant pain (04/03/2014), Pallor (04/03/2014), Personal history of other specified conditions (03/13/2014), and Personal history of other specified conditions (04/03/2014).    Surgical History  She has a past surgical history that includes Total abdominal hysterectomy w/ bilateral salpingoophorectomy (03/13/2014) and Tonsillectomy (03/13/2014).     Social History  She reports that she has never smoked. She has never used smokeless tobacco. She reports that she does not drink alcohol and does not use drugs.    Family History  Family History   Problem Relation Name Age of Onset    Heart disease Mother      Hypertension Mother      Prostate cancer Son          last june        Allergies  Doxycycline       Physical Exam  Physical Exam  HENT:      Head: Normocephalic.   Eyes:      Extraocular Movements: Extraocular movements intact.   Cardiovascular:      Rate and Rhythm: Regular rhythm. Bradycardia present.  "  Pulmonary:      Effort: Pulmonary effort is normal.   Abdominal:      Palpations: Abdomen is soft.      Tenderness: There is no abdominal tenderness.   Musculoskeletal:         General: No swelling.      Cervical back: Normal range of motion.      Comments: Unable to dorsiflex L foot. Decreased strength B/L LE.    Skin:     General: Skin is warm and dry.   Neurological:      Mental Status: She is alert and oriented to person, place, and time.   Psychiatric:         Mood and Affect: Mood normal.       Last Recorded Vitals  Blood pressure 147/71, pulse 54, temperature 36.2 °C (97.2 °F), temperature source Temporal, resp. rate 16, height 1.575 m (5' 2\"), weight 71.6 kg (157 lb 13.6 oz), SpO2 93%.      Assessment/Plan   Soni Herrera is a 89 y.o. 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     Plan:   - ECHO pending   - EKG reviewed at [facility] with no atrial arrhythmias noted. Recommend further cardiac ambulatory monitoring upon discharge     Malina Lyles DO  Internal Medicine, PGY-1     Seen, discussed and examined with Dr. Lyles,, above is representative of my medical decision making.  In addition, echo from today shows preserved ejection fraction, mild AS/AI, no evidence of PFO.  Will arrange for ambulatory monitor and cardiology follow-up on discharge.  DAPT per primary team/neurology.    Emery Tijerina MD   "

## 2024-12-12 NOTE — PROGRESS NOTES
Physical Therapy                 Therapy Communication Note    Patient Name: Soni Herrera  MRN: [id number]  Department: [facility]  Room: [facility]  Today's Date: 12/12/2024     Discipline: Physical Therapy    Missed Visit Reason: Missed Visit Reason: Patient placed on medical hold (Spoke with Pt's RN who was in the process of calling a stroke alert on the Pt. No evaluation as a result.)    Missed Time: Attempt    Comment:

## 2024-12-12 NOTE — CARE PLAN
Problem: General Stroke  Goal: Maintain BP within ordered limits throughout shift  Outcome: Progressing  Goal: No symptoms of aspiration throughout shift  Outcome: Progressing  Goal: Controlled blood glucose throughout shift  Outcome: Progressing   The patient's goals for the shift include get rest    The clinical goals for the shift include pt will have no new neuro deficits during shift

## 2024-12-12 NOTE — SIGNIFICANT EVENT
I reviewed the patient's MRI results.  The following findings were seen:    MRI BRAIN: 1. Subacute ischemic infarct in the left anterior cerebral artery territory along the left parasagittal frontal lobe associated with mild petechial cortical hemorrhage. 2. Moderate burden of supratentorial chronic small vessel ischemic disease with a chronic infarct in the right cerebellar hemisphere.   MRA HEAD AND NECK: 1. Absent visualization of the right V2, V3 and proximal V4 vertebral arteries which is age indeterminate given lack of priors but is felt to be chronic given the associated chronic infarct in the right cerebellar hemisphere and absence of an acute infarct in the right vertebral artery territory. The distal right intracranial vertebral artery and the right PICA are patent. Correlate with outside imaging recommended. 2. Otherwise, no large vessel intracranial occlusion, significant stenosis or aneurysm. The visualized portions of the left anterior cerebral artery are patent.   MACRO: None.   Signed by: Orville Zavala 12/11/2024 8:28 PM     I discussed the case with stroke neurology via the transfer center. They reviewed imaging and recommended continuing current plan.

## 2024-12-12 NOTE — CARE PLAN
Problem: General Stroke  Goal: Demonstrate improvement in neurological exam throughout the shift  Outcome: Progressing     The patient's goals for the shift include to go home    The clinical goals for the shift include Improvement in neurological exam by end of shift    Stroke alert called this morning for RUE change. CT performed, nothing new. Continue q4 neuro checks.

## 2024-12-12 NOTE — CONSULTS
Date of Service: 12/12/24  Patient: Soni Herrera  MRN: [id number]    History Of Present Illness  Soni Herrera is a 89 y.o. female presenting with fall.  Past medical history is notable for diabetes, hypertension, hyperlipidemia, stage IIIa CKD, 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     Last known well: three days ago  Had stroke symptoms resolved at time of presentation: No    The patient woke up on the floor two days ago.  She remembers being on the floor by the bed but cannot recall how she had fallen.  She was unable to get up at that time and reported she couldn't move her right leg.  The patient lives alone but has a  that came yesterday and found her.  EMS was called.    On presentation there was concern for stroke as she could not move the right lower extremity.    Review of Systems:  The systems were reviewed with pertinent positives and negatives documented in the HPI.      Past Medical & Surgical History  Past Medical History:   Diagnosis Date    Left upper quadrant pain 04/03/2014    Abdominal pain, left upper quadrant    Pallor 04/03/2014    Pallor    Personal history of other specified conditions 03/13/2014    History of chest pain    Personal history of other specified conditions 04/03/2014    History of fatigue     Past Surgical History:   Procedure Laterality Date    TONSILLECTOMY  03/13/2014    Tonsillectomy    TOTAL ABDOMINAL HYSTERECTOMY W/ BILATERAL SALPINGOOPHORECTOMY  03/13/2014    Total Abdominal Hysterectomy With Removal Of Both Ovaries     Social History:   Social History     Tobacco Use    Smoking status: Never    Smokeless tobacco: Never   Substance Use Topics    Alcohol use: Never     Family History:   No pertinent family history to chief complaint     Medications:     Current Facility-Administered Medications:     acetaminophen (Tylenol) tablet 650 mg, 650 mg, oral, q4h PRN **OR** acetaminophen (Tylenol) oral liquid 650  mg, 650 mg, oral, q4h PRN **OR** acetaminophen (Tylenol) suppository 650 mg, 650 mg, rectal, q4h PRN, Dorian Barry DO    [Held by provider] amLODIPine (Norvasc) tablet 10 mg, 10 mg, oral, Daily, Dorian Barry DO, 10 mg at 12/11/24 1643    aspirin chewable tablet 81 mg, 81 mg, oral, Daily, Dorian Barry DO, 81 mg at 12/11/24 1643    [Held by provider] atenolol (Tenormin) tablet 50 mg, 50 mg, oral, Daily, Dorian Barry DO, 50 mg at 12/11/24 1643    atorvastatin (Lipitor) tablet 80 mg, 80 mg, oral, Nightly, Dorian Barry DO, 80 mg at 12/11/24 2116    cefTRIAXone (Rocephin) 1 g in dextrose (iso) IV 50 mL, 1 g, intravenous, q24h, Dorian Barry DO    dextrose 50 % injection 12.5 g, 12.5 g, intravenous, q15 min PRN, Dorian Barry DO    dextrose 50 % injection 25 g, 25 g, intravenous, q15 min PRN, Dorian Barry DO    [Held by provider] enoxaparin (Lovenox) syringe 40 mg, 40 mg, subcutaneous, q24h, Dorian Barry DO, 40 mg at 12/11/24 1643    glucagon (Glucagen) injection 1 mg, 1 mg, intramuscular, q15 min PRN, Dorian Barry DO    glucagon (Glucagen) injection 1 mg, 1 mg, intramuscular, q15 min PRN, Dorian Barry DO    hydrALAZINE (Apresoline) injection 10 mg, 10 mg, intravenous, q20 min PRN **FOLLOWED BY** [START ON 12/13/2024] hydrALAZINE (Apresoline) tablet 25 mg, 25 mg, oral, q6h PRN, Dorian Barry DO    insulin lispro injection 0-10 Units, 0-10 Units, subcutaneous, TID AC, Dorian Barry DO, 4 Units at 12/11/24 1641    labetaloL (Normodyne,Trandate) injection 10 mg, 10 mg, intravenous, q10 min PRN, Dorian Barry DO    lactated Ringer's infusion, 50 mL/hr, intravenous, Continuous, Dorian Barry DO, Last Rate: 50 mL/hr at 12/11/24 1847, 50 mL/hr at 12/11/24 1847    [Held by provider] losartan (Cozaar) tablet 25 mg, 25 mg, oral, Daily, Dorian Barry DO, 25 mg at 12/11/24 1643    oxygen (O2) therapy, , inhalation, Continuous PRN - O2/gases, Dorian Barry DO    perflutren lipid microspheres (Definity)  "injection 0.5-10 mL of dilution, 0.5-10 mL of dilution, intravenous, Once in imaging, Dorian Barry, DO    perflutren protein A microsphere (Optison) injection 0.5 mL, 0.5 mL, intravenous, Once in imaging, Dorian Barry DO    sulfur hexafluoride microsphr (Lumason) injection 24.28 mg, 2 mL, intravenous, Once in imaging, Dorian Barry, DO     General Physical Exam:  /55   Pulse 58   Temp 36.4 °C (97.5 °F)   Resp 16   Ht 1.575 m (5' 2\")   Wt 71.6 kg (157 lb 13.6 oz)   SpO2 94%   BMI 28.87 kg/m²      She looks well and is not in any acute distress. Breathing comfortably on room air.     Neurological Exam:   Mental status reveals her to be alert and oriented to person, place, and date. Speech is intact to expression, comprehension, naming and repetition.     Cranial nerves:  CN 2   Visual fields full to confrontation.   CN 3, 4, 6   Pupils round, 4 mm in diameter, equally reactive to light. Lids symmetric; no ptosis. EOMs normal alignment, full range.   No nystagmus.   CN 5   Facial sensation intact bilaterally.   CN 7   Normal and symmetric facial strength. Nasolabial folds symmetric.   CN 8   Hearing intact to conversation.   CN 9   Palate elevates symmetrically.   CN 11   Normal strength of shoulder shrug and neck turning.   CN 12   Tongue midline, with normal bulk     Motor:    R L   5 5 Shoulder abduction  5 5 Elbow flexion  5 5 Elbow extension  5 5 Finger flexion  5 5 Finger extension  5 5 Finger abduction  0 4 Hip flexion  0 5+ Knee flexion  0 5 Knee extension  0 5 Ankle dorsiflexion  0 5 Ankle plantarflexion     Reflexes                         R     L  Biceps           2      2  Brachiorad    2      2  Patellar         1      1   Achilles         0      0    Babinski: toes downgoing to plantar stimulation. No clonus or other pathologic reflexes present.      Sensory:   In right lower extremity sensation diminished to light touch.     Coordination:  In both upper extremities, finger-nose-finger was " intact without dysmetria or overshoot.   In left lower extremities, heel-to-shin was intact. Unable to test on right due to weakness    Gait:  Station was stable with a normal base. Gait was stable with a normal arm swing and speed. No ataxia, shuffling, steppage or waddling was present. No circumduction was present. Tandem gait was intact. No Romberg sign was present.    NIHSS:  1a  Level of consciousness: 0=alert; keenly responsive   1b. LOC questions:  0=Performs both tasks correctly   1c. LOC commands: 0=Performs both tasks correctly   2.  Best Gaze: 0=normal   3. Visual: 0=No visual loss   4. Facial Palsy: 0=Normal symmetric movement   5a. Motor left arm: 0=No drift, limb holds 90 (or 45) degrees for full 10 seconds   5b.  Motor right arm: 0=No drift, limb holds 90 (or 45) degrees for full 10 seconds   6a. motor left le=No drift, limb holds 90 (or 45) degrees for full 10 seconds   6b  Motor right le=No movement   7. Limb Ataxia: 0=Absent   8.  Sensory: 1=Mild to moderate sensory loss; patient feels pinprick is less sharp or is dull on the affected side; there is a loss of superficial pain with pinprick but patient is aware She is being touched   9. Best Language:  0=No aphasia, normal   10. Dysarthria: 0=Normal   11. Extinction and Inattention: 0=No abnormality          Total:   5     Relevant Results  Results for orders placed or performed during the hospital encounter of 24 (from the past 24 hours)   CBC and Auto Differential   Result Value Ref Range    WBC 23.4 (H) 4.4 - 11.3 x10*3/uL    nRBC 0.0 0.0 - 0.0 /100 WBCs    RBC 4.22 4.00 - 5.20 x10*6/uL    Hemoglobin 13.6 12.0 - 16.0 g/dL    Hematocrit 40.9 36.0 - 46.0 %    MCV 97 80 - 100 fL    MCH 32.2 26.0 - 34.0 pg    MCHC 33.3 32.0 - 36.0 g/dL    RDW 13.3 11.5 - 14.5 %    Platelets 454 (H) 150 - 450 x10*3/uL    Neutrophils % 84.8 40.0 - 80.0 %    Immature Granulocytes %, Automated 0.6 0.0 - 0.9 %    Lymphocytes % 9.2 13.0 - 44.0 %    Monocytes  % 4.8 2.0 - 10.0 %    Eosinophils % 0.2 0.0 - 6.0 %    Basophils % 0.4 0.0 - 2.0 %    Neutrophils Absolute 19.83 (H) 1.60 - 5.50 x10*3/uL    Immature Granulocytes Absolute, Automated 0.14 0.00 - 0.50 x10*3/uL    Lymphocytes Absolute 2.14 0.80 - 3.00 x10*3/uL    Monocytes Absolute 1.12 (H) 0.05 - 0.80 x10*3/uL    Eosinophils Absolute 0.05 0.00 - 0.40 x10*3/uL    Basophils Absolute 0.10 0.00 - 0.10 x10*3/uL   Magnesium   Result Value Ref Range    Magnesium 1.57 (L) 1.60 - 2.40 mg/dL   Comprehensive metabolic panel   Result Value Ref Range    Glucose 282 (H) 74 - 99 mg/dL    Sodium 138 136 - 145 mmol/L    Potassium 3.7 3.5 - 5.3 mmol/L    Chloride 101 98 - 107 mmol/L    Bicarbonate 25 21 - 32 mmol/L    Anion Gap 16 10 - 20 mmol/L    Urea Nitrogen 28 (H) 6 - 23 mg/dL    Creatinine 0.87 0.50 - 1.05 mg/dL    eGFR 64 >60 mL/min/1.73m*2    Calcium 9.2 8.6 - 10.3 mg/dL    Albumin 3.6 3.4 - 5.0 g/dL    Alkaline Phosphatase 76 33 - 136 U/L    Total Protein 6.9 6.4 - 8.2 g/dL    AST 24 9 - 39 U/L    Bilirubin, Total 1.5 (H) 0.0 - 1.2 mg/dL    ALT 16 7 - 45 U/L   Lactate   Result Value Ref Range    Lactate 1.6 0.4 - 2.0 mmol/L   Protime-INR   Result Value Ref Range    Protime 12.2 9.8 - 12.8 seconds    INR 1.1 0.9 - 1.1   Troponin I, High Sensitivity   Result Value Ref Range    Troponin I, High Sensitivity 226 (HH) 0 - 13 ng/L   B-Type Natriuretic Peptide   Result Value Ref Range     (H) 0 - 99 pg/mL   Blood Gas Venous Full Panel   Result Value Ref Range    POCT pH, Venous 7.35 7.33 - 7.43 pH    POCT pCO2, Venous 45 41 - 51 mm Hg    POCT pO2, Venous 18 (L) 35 - 45 mm Hg    POCT SO2, Venous 24 (L) 45 - 75 %    POCT Oxy Hemoglobin, Venous 23.5 (L) 45.0 - 75.0 %    POCT Hematocrit Calculated, Venous 52.0 (H) 36.0 - 46.0 %    POCT Sodium, Venous 134 (L) 136 - 145 mmol/L    POCT Potassium, Venous 3.6 3.5 - 5.3 mmol/L    POCT Chloride, Venous 101 98 - 107 mmol/L    POCT Ionized Calicum, Venous 1.25 1.10 - 1.33 mmol/L    POCT  Glucose, Venous 314 (H) 74 - 99 mg/dL    POCT Lactate, Venous 2.0 0.4 - 2.0 mmol/L    POCT Base Excess, Venous -1.2 -2.0 - 3.0 mmol/L    POCT HCO3 Calculated, Venous 24.8 22.0 - 26.0 mmol/L    POCT Hemoglobin, Venous 17.3 (H) 12.0 - 16.0 g/dL    POCT Anion Gap, Venous 12.0 10.0 - 25.0 mmol/L    Patient Temperature      FiO2 21 %   Type And Screen   Result Value Ref Range    ABO TYPE A     Rh TYPE POS     ANTIBODY SCREEN NEG    Creatine Kinase   Result Value Ref Range    Creatine Kinase 432 (H) 0 - 215 U/L   Hemoglobin A1C   Result Value Ref Range    Hemoglobin A1C 7.6 (H) See comment %    Estimated Average Glucose 171 Not Established mg/dL   Sars-CoV-2 PCR   Result Value Ref Range    Coronavirus 2019, PCR Not Detected Not Detected   Influenza A, and B PCR   Result Value Ref Range    Flu A Result Not Detected Not Detected    Flu B Result Not Detected Not Detected   Urinalysis with Reflex Culture and Microscopic   Result Value Ref Range    Color, Urine Light-Orange (N) Light-Yellow, Yellow, Dark-Yellow    Appearance, Urine Ex.Turbid (N) Clear    Specific Gravity, Urine 1.031 1.005 - 1.035    pH, Urine 6.0 5.0, 5.5, 6.0, 6.5, 7.0, 7.5, 8.0    Protein, Urine 600 (3+) (A) NEGATIVE, 10 (TRACE), 20 (TRACE) mg/dL    Glucose, Urine Normal Normal mg/dL    Blood, Urine 0.2 (2+) (A) NEGATIVE    Ketones, Urine 10 (1+) (A) NEGATIVE mg/dL    Bilirubin, Urine NEGATIVE NEGATIVE    Urobilinogen, Urine 2 (1+) (A) Normal mg/dL    Nitrite, Urine NEGATIVE NEGATIVE    Leukocyte Esterase, Urine 25 Mahesh/µL (A) NEGATIVE   Extra Urine Gray Tube   Result Value Ref Range    Extra Tube Hold for add-ons.    Troponin I, High Sensitivity   Result Value Ref Range    Troponin I, High Sensitivity 224 (HH) 0 - 13 ng/L   Microscopic Only, Urine   Result Value Ref Range    WBC, Urine 21-50 (A) 1-5, NONE /HPF    RBC, Urine 11-20 (A) NONE, 1-2, 3-5 /HPF    Squamous Epithelial Cells, Urine 10-25 (FEW) Reference range not established. /HPF    Bacteria, Urine  4+ (A) NONE SEEN /HPF   Lipid Panel   Result Value Ref Range    Cholesterol 197 0 - 199 mg/dL    HDL-Cholesterol 51.3 mg/dL    Cholesterol/HDL Ratio 3.8     LDL Calculated 113 (H) <=99 mg/dL    VLDL 33 0 - 40 mg/dL    Triglycerides 165 (H) 0 - 149 mg/dL    Non HDL Cholesterol 146 0 - 149 mg/dL   Hemoglobin A1C   Result Value Ref Range    Hemoglobin A1C 7.7 (H) See comment %    Estimated Average Glucose 174 Not Established mg/dL   B-Type Natriuretic Peptide   Result Value Ref Range     (H) 0 - 99 pg/mL   POCT GLUCOSE   Result Value Ref Range    POCT Glucose 230 (H) 74 - 99 mg/dL   POCT GLUCOSE   Result Value Ref Range    POCT Glucose 175 (H) 74 - 99 mg/dL   POCT GLUCOSE   Result Value Ref Range    POCT Glucose 195 (H) 74 - 99 mg/dL        IMAGING  TTE  No echocardiogram results found for the past 14 days     MR brain wo IV contrast  MR brain wo IV contrast    Result Date: 12/11/2024  MRI BRAIN: 1. Subacute ischemic infarct in the left anterior cerebral artery territory along the left parasagittal frontal lobe associated with mild petechial cortical hemorrhage. 2. Moderate burden of supratentorial chronic small vessel ischemic disease with a chronic infarct in the right cerebellar hemisphere.   MRA HEAD AND NECK: 1. Absent visualization of the right V2, V3 and proximal V4 vertebral arteries which is age indeterminate given lack of priors but is felt to be chronic given the associated chronic infarct in the right cerebellar hemisphere and absence of an acute infarct in the right vertebral artery territory. The distal right intracranial vertebral artery and the right PICA are patent. Correlate with outside imaging recommended. 2. Otherwise, no large vessel intracranial occlusion, significant stenosis or aneurysm. The visualized portions of the left anterior cerebral artery are patent.   MACRO: None.   Signed by: Orville Zavala 12/11/2024 8:28 PM Dictation workstation:   [id number]      CT head wo IV  contrast  CT head wo IV contrast    Result Date: 12/11/2024  Exam demonstrates findings consistent with an acute left anterior cerebral artery infarct. No evidence for hemorrhage. There is only regional mass effect upon the adjacent sulci with no midline shift. Age-related atrophy is present.     MACRO: Von Nettles communicated these findings  by EPIC secure chat with JAKOB CHOU on 12/11/2024 at 12:53 pm.  (**-RCF-**) Findings:  See findings.     Signed by: Von Nettles 12/11/2024 12:53 PM Dictation workstation:   [id number]      IV Thrombolysis    IV Thrombolysis Given: No; Thrombolysis contraindication reason: Time from Last Known Well (or stroke onset) is >4.5 hours     Impression:  Soni Herrera is a 89 y.o. who presents after a fall. LNK unknown. Not a TNK candidate as LKN unknown. MRI brain shows subacute infarct in left ANASTACIO territory along the left parasagittal frontal lobe, mild petechial cortical hemorrhage, very small focus of infarct in right parietal lobe. MRA shows chronic R vertebral occlusion, otherwise no LVO or significant stenosis, L ANASTACIO is patent.    Type: Ischemic stroke  Subtype/etiology: Possibly cardioembolic given infarct in L ANASTCAIO territory, along with very small infarct in right parietal lobe  Vessels involved: L ANASTACIO  Neurological manifestations:  NIHSS (worst at presentation): 4   Risk Factors: Diabetes, Hypertension, and Hyperlipidemia  Diagnostic evaluation: Hemoglobin A1c 7.7, Tcholesterol 197, ,     Plan:    - Patient on ASA 81mg daily at time of stroke. Will switch to plavix 75mg daily  - Atorva 80mg QHS  - TTE pending  - Holter monitor upon discharge given stroke in L ANASTACIO and small infarct in R parietal lobe  - PT/OT    Vascular Risk Factor modification goals:  Blood pressure goals: avoid hypotension SBP <100 that could worsen cerebral perfusion, Ischemic stroke- early permissive hypertension SBP < 220 mmHg with cautious inpatient lowering  Lipid Goals: education  on healthy diet and statin therapy to maintain or achieve goal LDL-cholesterol < 70mg  Glucose Goals: early treatment of hyperglycemia to goal glucose 140-180 mg/dl with long-term goal A1c < 7%   Smoking Cessation and Education  Assessment for Rehabilitation needs   Patient and family education on signs and symptoms of stroke, calling 911, healthy strategies for stroke prevention.      Reviewed and approved by JUSTO BURNETT on 12/12/24 at 7:05 AM.    Medical decision making is high. Patient has acute illness that poses a threat to bodily function. I performed independent interpretation of neuroimaging, discussed management with another health professional and reviewed multiple notes from other providers. There is a high risk of morbidity.

## 2024-12-12 NOTE — PROGRESS NOTES
Physical Therapy                 Therapy Communication Note    Patient Name: Soni Herrera  MRN: [id number]  Department: [facility]  Room: [address] [place], [place] [address]  Today's Date: 12/12/2024     Discipline: Physical Therapy    Missed Visit Reason: Missed Visit Reason: Patient in a medical procedure ([place] with Pt's RN to clear Pt for PT, RN states that Pt is off of floor at echo testing. No evaluation as a result.)    Missed Time: Attempt    Comment:

## 2024-12-12 NOTE — PROGRESS NOTES
Occupational Therapy                 Therapy Communication Note    Patient Name: Soni Herrera  MRN: [id number]  Department: [facility]  Room: [address] [place] [address]A  Today's Date: 12/12/2024     Discipline: Occupational Therapy    Missed Visit Reason: Patient placed on medical hold (stroke alert; OT evaluation not occur)    Missed Time: Attempt    Comment: Stroke alert was called on pt and due to this medical instability on this date OT evaluation will be held

## 2024-12-13 LAB
ALBUMIN SERPL BCP-MCNC: 2.9 G/DL (ref 3.4–5)
ANION GAP SERPL CALC-SCNC: 14 MMOL/L (ref 10–20)
BACTERIA UR CULT: ABNORMAL
BUN SERPL-MCNC: 44 MG/DL (ref 6–23)
CALCIUM SERPL-MCNC: 8.2 MG/DL (ref 8.6–10.3)
CHLORIDE SERPL-SCNC: 105 MMOL/L (ref 98–107)
CO2 SERPL-SCNC: 23 MMOL/L (ref 21–32)
CREAT SERPL-MCNC: 0.87 MG/DL (ref 0.5–1.05)
EGFRCR SERPLBLD CKD-EPI 2021: 64 ML/MIN/1.73M*2
ERYTHROCYTE [DISTWIDTH] IN BLOOD BY AUTOMATED COUNT: 13.6 % (ref 11.5–14.5)
GLUCOSE BLD MANUAL STRIP-MCNC: 198 MG/DL (ref 74–99)
GLUCOSE BLD MANUAL STRIP-MCNC: 219 MG/DL (ref 74–99)
GLUCOSE BLD MANUAL STRIP-MCNC: 237 MG/DL (ref 74–99)
GLUCOSE BLD MANUAL STRIP-MCNC: 292 MG/DL (ref 74–99)
GLUCOSE SERPL-MCNC: 215 MG/DL (ref 74–99)
HCT VFR BLD AUTO: 34.5 % (ref 36–46)
HGB BLD-MCNC: 11.3 G/DL (ref 12–16)
MAGNESIUM SERPL-MCNC: 1.99 MG/DL (ref 1.6–2.4)
MCH RBC QN AUTO: 32.3 PG (ref 26–34)
MCHC RBC AUTO-ENTMCNC: 32.8 G/DL (ref 32–36)
MCV RBC AUTO: 99 FL (ref 80–100)
NRBC BLD-RTO: 0 /100 WBCS (ref 0–0)
PHOSPHATE SERPL-MCNC: 3.3 MG/DL (ref 2.5–4.9)
PLATELET # BLD AUTO: 374 X10*3/UL (ref 150–450)
POTASSIUM SERPL-SCNC: 3.8 MMOL/L (ref 3.5–5.3)
RBC # BLD AUTO: 3.5 X10*6/UL (ref 4–5.2)
SODIUM SERPL-SCNC: 138 MMOL/L (ref 136–145)
WBC # BLD AUTO: 12.5 X10*3/UL (ref 4.4–11.3)

## 2024-12-13 PROCEDURE — 99232 SBSQ HOSP IP/OBS MODERATE 35: CPT | Performed by: INTERNAL MEDICINE

## 2024-12-13 PROCEDURE — 97161 PT EVAL LOW COMPLEX 20 MIN: CPT | Mod: GP

## 2024-12-13 PROCEDURE — 2500000001 HC RX 250 WO HCPCS SELF ADMINISTERED DRUGS (ALT 637 FOR MEDICARE OP): Performed by: INTERNAL MEDICINE

## 2024-12-13 PROCEDURE — 83735 ASSAY OF MAGNESIUM: CPT | Performed by: INTERNAL MEDICINE

## 2024-12-13 PROCEDURE — 2500000002 HC RX 250 W HCPCS SELF ADMINISTERED DRUGS (ALT 637 FOR MEDICARE OP, ALT 636 FOR OP/ED): Performed by: INTERNAL MEDICINE

## 2024-12-13 PROCEDURE — 99233 SBSQ HOSP IP/OBS HIGH 50: CPT | Performed by: STUDENT IN AN ORGANIZED HEALTH CARE EDUCATION/TRAINING PROGRAM

## 2024-12-13 PROCEDURE — 84100 ASSAY OF PHOSPHORUS: CPT | Performed by: INTERNAL MEDICINE

## 2024-12-13 PROCEDURE — 97166 OT EVAL MOD COMPLEX 45 MIN: CPT | Mod: GO

## 2024-12-13 PROCEDURE — 94760 N-INVAS EAR/PLS OXIMETRY 1: CPT

## 2024-12-13 PROCEDURE — 1200000002 HC GENERAL ROOM WITH TELEMETRY DAILY

## 2024-12-13 PROCEDURE — 85027 COMPLETE CBC AUTOMATED: CPT | Performed by: INTERNAL MEDICINE

## 2024-12-13 PROCEDURE — 2500000004 HC RX 250 GENERAL PHARMACY W/ HCPCS (ALT 636 FOR OP/ED): Performed by: INTERNAL MEDICINE

## 2024-12-13 PROCEDURE — 36415 COLL VENOUS BLD VENIPUNCTURE: CPT | Performed by: INTERNAL MEDICINE

## 2024-12-13 PROCEDURE — 97530 THERAPEUTIC ACTIVITIES: CPT | Mod: GP

## 2024-12-13 PROCEDURE — 97535 SELF CARE MNGMENT TRAINING: CPT | Mod: GO

## 2024-12-13 PROCEDURE — 82947 ASSAY GLUCOSE BLOOD QUANT: CPT

## 2024-12-13 RX ORDER — CLOPIDOGREL BISULFATE 75 MG/1
75 TABLET ORAL DAILY
Status: DISCONTINUED | OUTPATIENT
Start: 2024-12-14 | End: 2024-12-18 | Stop reason: HOSPADM

## 2024-12-13 ASSESSMENT — COGNITIVE AND FUNCTIONAL STATUS - GENERAL
WALKING IN HOSPITAL ROOM: A LOT
DAILY ACTIVITIY SCORE: 15
MOVING FROM LYING ON BACK TO SITTING ON SIDE OF FLAT BED WITH BEDRAILS: A LOT
DRESSING REGULAR LOWER BODY CLOTHING: A LOT
MOVING TO AND FROM BED TO CHAIR: TOTAL
STANDING UP FROM CHAIR USING ARMS: A LOT
MOBILITY SCORE: 12
MOBILITY SCORE: 8
PERSONAL GROOMING: A LITTLE
DRESSING REGULAR UPPER BODY CLOTHING: A LOT
TURNING FROM BACK TO SIDE WHILE IN FLAT BAD: A LOT
TOILETING: A LOT
EATING MEALS: A LITTLE
STANDING UP FROM CHAIR USING ARMS: TOTAL
WALKING IN HOSPITAL ROOM: A LOT
WALKING IN HOSPITAL ROOM: TOTAL
CLIMB 3 TO 5 STEPS WITH RAILING: TOTAL
MOVING FROM LYING ON BACK TO SITTING ON SIDE OF FLAT BED WITH BEDRAILS: A LOT
CLIMB 3 TO 5 STEPS WITH RAILING: A LOT
PERSONAL GROOMING: A LITTLE
DRESSING REGULAR UPPER BODY CLOTHING: A LOT
DRESSING REGULAR UPPER BODY CLOTHING: A LOT
MOVING TO AND FROM BED TO CHAIR: A LOT
HELP NEEDED FOR BATHING: A LOT
DRESSING REGULAR LOWER BODY CLOTHING: TOTAL
TURNING FROM BACK TO SIDE WHILE IN FLAT BAD: A LOT
PERSONAL GROOMING: A LITTLE
DAILY ACTIVITIY SCORE: 15
TOILETING: TOTAL
MOBILITY SCORE: 12
CLIMB 3 TO 5 STEPS WITH RAILING: A LOT
MOVING FROM LYING ON BACK TO SITTING ON SIDE OF FLAT BED WITH BEDRAILS: A LOT
HELP NEEDED FOR BATHING: A LOT
DAILY ACTIVITIY SCORE: 12
TOILETING: A LOT
TURNING FROM BACK TO SIDE WHILE IN FLAT BAD: A LOT
HELP NEEDED FOR BATHING: A LOT
MOVING TO AND FROM BED TO CHAIR: A LOT
STANDING UP FROM CHAIR USING ARMS: A LOT
DRESSING REGULAR LOWER BODY CLOTHING: A LOT

## 2024-12-13 ASSESSMENT — PAIN SCALES - GENERAL
PAINLEVEL_OUTOF10: 0 - NO PAIN

## 2024-12-13 ASSESSMENT — ACTIVITIES OF DAILY LIVING (ADL)
BATHING_ASSISTANCE: MODERATE
ADL_ASSISTANCE: INDEPENDENT
ADL_ASSISTANCE: INDEPENDENT
HOME_MANAGEMENT_TIME_ENTRY: 14
ADLS_ADDRESSED: YES

## 2024-12-13 ASSESSMENT — PAIN - FUNCTIONAL ASSESSMENT
PAIN_FUNCTIONAL_ASSESSMENT: 0-10

## 2024-12-13 NOTE — CARE PLAN
Problem: General Stroke  Goal: Demonstrate improvement in neurological exam throughout the shift  Outcome: Progressing      The clinical goals for the shift include Improvement in neuro exam by end of shift    PT/OT worked with patient and recommended moderate intensity rehab. No changes in neurological exam. Continue to monitor.

## 2024-12-13 NOTE — PROGRESS NOTES
"Soni Herrera is a 89 y.o. female on day 2 of admission presenting with Stroke with cerebral ischemia (Multi).    Subjective   She reports being tire this morning. No additional complaints.    Yesterday, stroke code called as the patient's left upper extremity was flaccid during PT visit. On evaluation minor drift in the left upper extremity and NIH remained 5, similar to prior evaluation. CTH was repeated without evidence of acute hemorrhage. Evidence of table acute infarct in the left parasaggital region.         Objective     Last Recorded Vitals  Blood pressure 154/72, pulse 62, temperature 36.4 °C (97.5 °F), temperature source Temporal, resp. rate 16, height 1.575 m (5' 2\"), weight 71.6 kg (157 lb 13.6 oz), SpO2 94%.    Mental status: A&Ox3. Follows simple commands. Language intact to naming, comprehension and repetition.   CN: PERRL 3 mm. EOM full range. Visual field full. Muscles of facial expression intact. Facial sensation intact bilaterally  Motor: LUE with 4+/5 strength. RUE 5/5 strength. RLE 1/5 (wiggles toes)  and LLE 5/5  Sensory: Diminished LT in RLE    Scheduled medications  [Held by provider] amLODIPine, 10 mg, oral, Daily  aspirin, 81 mg, oral, Daily  [Held by provider] atenolol, 50 mg, oral, Daily  atorvastatin, 80 mg, oral, Nightly  cefTRIAXone, 1 g, intravenous, q24h  [Held by provider] enoxaparin, 40 mg, subcutaneous, q24h  insulin lispro, 0-10 Units, subcutaneous, TID AC  [Held by provider] losartan, 25 mg, oral, Daily  perflutren lipid microspheres, 0.5-10 mL of dilution, intravenous, Once in imaging  perflutren protein A microsphere, 0.5 mL, intravenous, Once in imaging  sulfur hexafluoride microsphr, 2 mL, intravenous, Once in imaging      Continuous medications     PRN medications  PRN medications: acetaminophen **OR** acetaminophen **OR** acetaminophen, dextrose, dextrose, glucagon, glucagon, hydrALAZINE **FOLLOWED BY** hydrALAZINE, labetaloL, oxygen    Impression  Soni Herrera is a 89 " y.o. who presented after a fall on 12/10. LNK 12/9, but exact time unknown. She was not a TNK candidate as LKN unknown. MRI brain shows subacute infarct in left ANASTACIO territory along the left parasagittal frontal lobe, mild petechial cortical hemorrhage, very small focus of infarct in right parietal lobe. MRA shows chronic R vertebral occlusion, otherwise no LVO or significant stenosis, L ANASTACIO is patent.     Type: Ischemic stroke  Subtype/etiology: Possibly cardioembolic given infarct in L ANASTACIO territory, along with very small infarct in right parietal lobe  Vessels involved: L ANASTACIO  Neurological manifestations: RLE weakness, RUE drift  NIHSS (worst at presentation): 4, remains 4 today  Risk Factors: Diabetes, Hypertension, and Hyperlipidemia  Diagnostic evaluation: Hemoglobin A1c 7.7, Tcholesterol 197, , . TTE: LVEF 60-65%, LA normal, No PFO     Plan:     - Plavix 75mg daily  - Atorva 80mg QHS  - Holter monitor to eval for afib upon discharge given stroke in L ANASTACIO and small infarct in R parietal lobe  - PT/OT pending     Vascular Risk Factor modification goals:  Blood pressure goals: avoid hypotension SBP <100 that could worsen cerebral perfusion, Ischemic stroke- early permissive hypertension SBP < 220 mmHg with cautious inpatient lowering  Lipid Goals: education on healthy diet and statin therapy to maintain or achieve goal LDL-cholesterol < 70mg  Glucose Goals: early treatment of hyperglycemia to goal glucose 140-180 mg/dl with long-term goal A1c < 7%   Smoking Cessation and Education  Assessment for Rehabilitation needs   Patient and family education on signs and symptoms of stroke, calling 911, healthy strategies for stroke prevention.      I spent 50 minutes in the professional and overall care of this patient.    Reyes Ruiz MD

## 2024-12-13 NOTE — PROGRESS NOTES
Occupational Therapy    Evaluation    Patient Name: Soni Herrera  MRN: [id number]  Department: [address]  Room: [address] [place], [place] [address]  Today's Date: 12/13/2024  Time Calculation  Start Time: 1042  Stop Time: 1116  Time Calculation (min): 34 min    Assessment  IP OT Assessment  OT Assessment: Pt presents with acute neurological deficits resulting in impaired mobility and decreased ADL performance. Will benefit from skilled OT services to increase functional outcomes  Prognosis: Good  Barriers to Discharge Home: Caregiver assistance, Physical needs  Caregiver Assistance: Patient lives alone and/or does not have reliable caregiver assistance  Physical Needs: Stair navigation to access bed limited by function/safety, Ambulating household distances limited by function/safety, 24hr mobility assistance needed, 24hr ADL assistance needed, High falls risk due to function or environment  Evaluation/Treatment Tolerance: Patient tolerated treatment well  Medical Staff Made Aware: Yes  End of Session Communication: Bedside nurse  End of Session Patient Position: Bed, 3 rail up, Alarm on  Plan:  Treatment Interventions: ADL retraining, Functional transfer training, UE strengthening/ROM, Endurance training, Neuromuscular reeducation, Compensatory technique education  OT Frequency: 3 times per week  OT Discharge Recommendations: Moderate intensity level of continued care  Equipment Recommended upon Discharge:  (TBD)  OT - OK to Discharge: Yes    Subjective     General:  General  Reason for Referral: acute ischemic left ANASTACIO stroke; referred to OT for impaired ADL  Past Medical History Relevant to Rehab: history of diabetes, hypertension, hyperlipidemia, stage IIIa CKD, 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  Precautions:  Medical Precautions: Fall precautions (purewick, tele, pressure relief boots)      Pain:  Pain Assessment  Pain Assessment: 0-10  0-10 (Numeric) Pain Score: 0 - No pain    Objective   Cognition:  Overall Cognitive Status: Within Functional Limits  Orientation Level: Oriented X4           Home Living:  Type of Home: House  Lives With: Alone  Home Layout: Multi-level (split level)  Alternate Level Stairs-Rails: Left  Alternate Level Stairs-Number of Steps: 7  Home Access: Stairs to enter with rails  Entrance Stairs-Rails: Left  Entrance Stairs-Number of Steps: 4   Prior Function:  ADL Assistance: Independent  Homemaking Assistance: Needs assistance (DIL assists with transportation)  Prior Function Comments: Pt reports having help on Wednesdays to complete housework and laundry    ADL:  Eating Assistance: Minimal  Eating Deficit: Setup, Supervision/safety, Increased time to complete, Bringing food to mouth assist  Grooming Assistance: Minimal  Grooming Deficit: Setup, Verbal cueing, Supervision/safety, Increased time to complete  Bathing Assistance: Moderate  Bathing Deficit: Setup, Verbal cueing, Supervision/safety, Increased time to complete   UE Dressing Assistance: Moderate  UE Dressing Deficit: Setup, Verbal cueing, Supervision/safety  LE Dressing Assistance: Total  Toileting Assistance with Device: Total  Toileting Deficit: Incontinent, Perineal hygiene (incontinent urine and BM, extended time spent in completing hygiene)  Activity Tolerance:  Endurance: Tolerates 10 - 20 min exercise with multiple rests  Bed Mobility/Transfers: Bed Mobility  Bed Mobility: Yes  Bed Mobility 1  Bed Mobility 1: Supine to sitting, Sitting to supine  Level of Assistance 1: Maximum assistance  Bed Mobility Comments 1: x2 assist  Bed Mobility 2  Bed Mobility  2: Rolling right, Rolling left  Level of Assistance 2: Maximum assistance  Bed Mobility Comments 2:  cues and assists to maintain  on bed rails. When rolling towards L, pt requires MaxA to maintain sidelying position for kev care  Bed Mobility 3  Bed Mobility 3: Scooting  Level of Assistance 3: Maximum assistance (x2)  Bed Mobility Comments 3: repositoning in bed    Transfers  Transfer: No (Unable to safely attemps transfers today due to observed sitting balance/trunk control deficit)    Sitting Balance:  Static Sitting Balance  Static Sitting-Balance Support: Bilateral upper extremity supported, Feet supported  Static Sitting-Level of Assistance: Moderate assistance  Static Sitting-Comment/Number of Minutes: forward and R lateral lean observed with trails at unassisted sitting. Pt demos and reports dereased awareness of positioning in space and requires cues and assistance to maintain midline sitting balance    Sensation:  Proprioception:  (Not formally tested, observed likely impairment during seated activity)  Strength:  Strength Comments: LUE 4-/5; RUE 3/5    Coordination:  Finger to Nose: Impaired (R and L both with observed deficits, ? baseline)     Extremities: RUE   RUE :  (< 90 degrees shoulder overhead elevation, WFL distally. Pt reports significant ROM improvement within 24 hrs. Able to feed herself breakfast this AM with with some difficulty) and LUE   LUE: Within Functional Limits    Outcome Measures: [facility] Daily Activity  Putting on and taking off regular lower body clothing: Total  Bathing (including washing, rinsing, drying): A lot  Putting on and taking off regular upper body clothing: A lot  Toileting, which includes using toilet, bedpan or urinal: Total  Taking care of personal grooming such as brushing teeth: A little  Eating Meals: A little  Daily Activity - Total Score: 12      Education Documentation  Body Mechanics, taught by Harper Huffman OT at 12/13/2024  1:16 PM.  Learner: Patient  Readiness: Acceptance  Method: Explanation  Response: Needs Reinforcement    Goals:   Encounter  Problems       Encounter Problems (Active)       OT Goals       Pt will complete bed mobility activities with Kellen (Progressing)       Start:  12/13/24    Expected End:  12/27/24            Pt will demo functional transfers to/ from EOB, chair and commode with ModA and LRD (Progressing)       Start:  12/13/24    Expected End:  12/27/24            Pt will demo ADL routine and meaningful daily activities with Kellen using modifications as needed  (Progressing)       Start:  12/13/24    Expected End:  12/27/24            Pt will demo improved static/ dynamic sitting balance, evidenced by completion of ADL or functional activity with < CG assist for duration of activity.   (Progressing)       Start:  12/13/24    Expected End:  12/27/24            Pt will complete A/AAROM BUE exercises, 10-15 reps 1-2 sets in all functional planes, to demo improved functional strength and NM re-edu for increased participation in ADL and mobility  (Progressing)       Start:  12/13/24    Expected End:  12/27/24

## 2024-12-13 NOTE — PROGRESS NOTES
Physical Therapy    Physical Therapy Evaluation & Treatment    Patient Name: Soni Herrera  MRN: [id number]  Department: [address]  [facility]  Today's Date: 12/13/2024   Time Calculation  Start Time: 1042  Stop Time: 1118  Time Calculation (min): 36 min    Assessment/Plan   PT Assessment  PT Assessment Results: Decreased strength, Decreased endurance, Impaired balance, Decreased mobility  Rehab Prognosis: Fair  Barriers to Discharge Home: Physical needs  Physical Needs: 24hr mobility assistance needed  Evaluation/Treatment Tolerance: Patient tolerated treatment well  Medical Staff Made Aware: Yes  Strengths: Ability to acquire knowledge  Barriers to Participation: Comorbidities  End of Session Communication: Bedside nurse  Assessment Comment: Patient with significant R sided weakness specifially at trunk and R LE resulting in decreased functional mobility and increased assist. Patient would continue to benefit from MOD intensity PT intervention.  End of Session Patient Position: Bed, 3 rail up, Alarm on   IP OR SWING BED PT PLAN  Inpatient or Swing Bed: Inpatient  PT Plan  Treatment/Interventions: Bed mobility, Transfer training, Gait training, Stair training, Balance training, Strengthening, Endurance training, Therapeutic exercise  PT Plan: Ongoing PT  PT Frequency: 3 times per week  PT Discharge Recommendations: Moderate intensity level of continued care  Equipment Recommended upon Discharge:  (TBD)  PT Recommended Transfer Status: Assist x2  PT - OK to Discharge: Yes (per PT POC)      Subjective     General Visit Information:  General  Reason for Referral: acute ischemic left ANASTACIO stroke; referred to PT for impaired functional mobility  Referred By: Dorian Barry  Past Medical History Relevant to Rehab: history of diabetes, hypertension, hyperlipidemia, stage IIIa CKD, 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  Family/Caregiver Present: No  Co-Treatment:  OT  Co-Treatment Reason: To maximize pt outcomes  Prior to Session Communication: Bedside nurse, PCT/NA/CTA  Patient Position Received: Bed, 3 rail up, Alarm on  General Comment: Patient pleasant, cooperative and agreeable to therapy assessment  Home Living:  Home Living  Type of Home: House  Lives With: Alone  Home Layout: Multi-level (split level)  Alternate Level Stairs-Rails: Left  Alternate Level Stairs-Number of Steps: 7  Home Access: Stairs to enter with rails  Entrance Stairs-Rails: Left  Entrance Stairs-Number of Steps: 4  Prior Level of Function:  Prior Function Per Pt/Caregiver Report  Level of [place]: Independent with ADLs and functional transfers, Independent with homemaking with ambulation  ADL Assistance: Independent  Homemaking Assistance: Needs assistance (Cleaning women 1x/week, dtr manages driving)  Ambulatory Assistance: Independent  Precautions:  Precautions  Medical Precautions: Fall precautions (purewick, tele, pressure relief boots)    Vital Signs (Past 2hrs)        Date/Time Vitals Session Patient Position Pulse Resp SpO2 BP MAP (mmHg)    12/13/24 1236 --  --  72  17  96 %  131/55  --                        Objective   Pain:  Pain Assessment  Pain Assessment: 0-10  0-10 (Numeric) Pain Score: 0 - No pain  Cognition:  Cognition  Overall Cognitive Status: Within Functional Limits  Orientation Level: Oriented X4    General Assessments:     Activity Tolerance  Endurance: Tolerates 10 - 20 min exercise with multiple rests    Sensation  Light Touch: No apparent deficits    Strength  Strength Comments: R hip 1/5, R knee flexion 1/5, R ankle 0/5; L LE 3+/5    Coordination  Movements are Fluid and Coordinated: No    Postural Control  Postural Control: Impaired (decreased R trunk control)    Static Sitting Balance  Static Sitting-Balance Support: Bilateral upper extremity supported, Feet supported  Static Sitting-Level of Assistance: Moderate assistance  Static Sitting-Comment/Number of Minutes:  R sided lean       Functional Assessments:    Bed Mobility  Bed Mobility: Yes  Bed Mobility 1  Bed Mobility 1: Supine to sitting, Sitting to supine  Level of Assistance 1: Maximum assistance (x 2)  Bed Mobility 2  Bed Mobility  2: Rolling right, Rolling left  Level of Assistance 2: Maximum assistance  Bed Mobility Comments 2: cues for use of rail  Bed Mobility 3  Bed Mobility 3:  (Repositioning up in bed)  Level of Assistance 3: Maximum assistance (x 2)    Transfers  Transfer: No (Unable to safely attemps transfers today due to observed sitting balance/trunk control deficit, R LE signiifcant weakness)         Treatments:   ADL  ADL's Addressed: Yes (Patient incontinent of bowel and bladder: max A for hygiene and linen change). Multiple rolls completed     Bed Mobility  Bed Mobility: Yes  Bed Mobility 1  Bed Mobility 1: Supine to sitting, Sitting to supine  Level of Assistance 1: Maximum assistance (x 2)  Bed Mobility 2  Bed Mobility  2: Rolling right, Rolling left  Level of Assistance 2: Maximum assistance  Bed Mobility Comments 2: cues for use of rail  Bed Mobility 3  Bed Mobility 3:  (Repositioning up in bed)  Level of Assistance 3: Maximum assistance (x 2)       Transfers  Transfer: No (Unable to safely attemps transfers today due to observed sitting balance/trunk control deficit, R LE signiifcant weakness)       Outcome Measures:  [facility] Basic Mobility  Turning from your back to your side while in a flat bed without using bedrails: A lot  Moving from lying on your back to sitting on the side of a flat bed without using bedrails: A lot  Moving to and from bed to chair (including a wheelchair): Total  Standing up from a chair using your arms (e.g. wheelchair or bedside chair): Total  To walk in hospital room: Total  Climbing 3-5 steps with railing: Total  Basic Mobility - Total Score: 8    Encounter Problems       Encounter Problems (Active)       Balance       STG - Maintains static standing balance with upper  extremity support x 3' min A  (Progressing)       Start:  12/13/24    Expected End:  12/27/24            STG - Maintains dynamic sitting balance with upper extremity support x 10' supervision  (Progressing)       Start:  12/13/24    Expected End:  12/27/24               Mobility       STG - Patient will ambulate with LRD 10' min A  (Progressing)       Start:  12/13/24    Expected End:  12/27/24            STG - Patient will ascend and descend 4 stairs B rails min A  (Progressing)       Start:  12/13/24    Expected End:  12/27/24               PT Transfers       STG - Patient will perform bed mobility min A   (Progressing)       Start:  12/13/24    Expected End:  12/27/24            STG - Patient will transfer sit to and from stand min A   (Progressing)       Start:  12/13/24    Expected End:  12/27/24                   Education Documentation  Precautions, taught by Liliana Manzano, PT at 12/13/2024  2:14 PM.  Learner: Patient  Readiness: Acceptance  Method: Explanation  Response: Verbalizes Understanding    Body Mechanics, taught by Liliana Manzano PT at 12/13/2024  2:14 PM.  Learner: Patient  Readiness: Acceptance  Method: Explanation  Response: Verbalizes Understanding    Mobility Training, taught by Liliana Manzano, PT at 12/13/2024  2:14 PM.  Learner: Patient  Readiness: Acceptance  Method: Explanation  Response: Verbalizes Understanding    Education Comments  No comments found.

## 2024-12-13 NOTE — CARE PLAN
Problem: General Stroke  Goal: Maintain BP within ordered limits throughout shift  Outcome: Progressing  Goal: No symptoms of aspiration throughout shift  Outcome: Progressing   The patient's goals for the shift include to go home    The clinical goals for the shift include patient will have no new neuro deficits during shift     Patient remained safe from injury during shift, was repositioned every 2 hours, no new neuro deficits noted. Call light was in reach with bed in the lowest position.

## 2024-12-13 NOTE — PROGRESS NOTES
12/13/24 1336   Discharge Planning   Living Arrangements Alone  ((May discharge to son and daalethea in laws home in [facility]))   Support Systems Children;Friends/neighbors   Assistance Needed Alert and oriented x 3, Independent with ADL's, Uses cane as needed, Doesn't drive anymore; Room air baseline and currently room air; PCP Jocelyn Bowens CNP   Type of Residence Private residence   Number of Stairs to Enter Residence 3   Number of Stairs Within Residence 14   Do you have animals or pets at home? No   Who is requesting discharge planning? Provider   Home or Post Acute Services Post acute facilities (Rehab/SNF/etc)   Type of Post Acute Facility Services Skilled nursing   Expected Discharge Disposition SNF  (PT/OT receommending moderate intensity rehab, will need to provide patient and family with PAQN list, so they can review and preference a facility, patient will then need a precert.)   Does the patient need discharge transport arranged? Yes   RoundTrip coordination needed? Yes   Has discharge transport been arranged? No   Patient Choice   Provider Choice list and CMS website (https://medicare.gov/care-compare#search) for post-acute Quality and Resource Measure Data were provided and reviewed with: Patient;Family   Patient / Family choosing to utilize agency / facility established prior to hospitalization No   Intensity of Service   Intensity of Service 0-30 min     12/13/24: 15:21 Spoke with the patient at the bedside to discuss discharge plan and PT/OT recommendations for moderate intensity rehab. The patient is now agreeable to skilled rehab facility and referrals were [facility] to [facility], [facility], and [facility], will await for facility acceptance and patient will need a precert. TCC to follow

## 2024-12-13 NOTE — PROGRESS NOTES
"  Subjective    Patient reports right arm is working better but she states right leg is still not working well.  She reports that the left arm and left leg are working fine.  She denies chest pain, shortness of breath, nausea, vomiting or diarrhea.  Objective    Vitals  Visit Vitals  /55 (BP Location: Right arm, Patient Position: Lying) Comment (BP Location): forearm   Pulse 72   Temp 36 °C (96.8 °F) (Temporal)   Resp 17   Ht 1.575 m (5' 2\")   Wt 71.6 kg (157 lb 13.6 oz)   SpO2 96%   BMI 28.87 kg/m²   Smoking Status Never   BSA 1.77 m²       Physical Exam   General: Patient is alert and oriented to self, birthday, president, year, age and month.  No acute distress.    HEENT: Clear sclera.  CVS: RRR.  Lungs: CTAB.   Abdomen: Soft.  Nontender.  Bowel sounds present.  Extremities: No pitting edema bilat ankles.  Right lower extremity, anterior and lateral proximal aspect with very mild discoloration of ecchymosis.  Left, second toe with ecchymosis.    Neurologic: NIH stroke score is approximately 3. (3 points for right leg weakness).   Psychiatric: Cooperative.     IOs    Intake/Output Summary (Last 24 hours) at 12/13/2024 1428  Last data filed at 12/13/2024 0831  Gross per 24 hour   Intake 887.5 ml   Output 600 ml   Net 287.5 ml       Labs:   Results from last 72 hours   Lab Units 12/13/24  0659 12/12/24  1609 12/11/24  1134   SODIUM mmol/L 138 137 138   POTASSIUM mmol/L 3.8 3.4* 3.7   CHLORIDE mmol/L 105 100 101   CO2 mmol/L 23 23 25   BUN mg/dL 44* 48* 28*   CREATININE mg/dL 0.87 1.04 0.87   GLUCOSE mg/dL 215* 299* 282*   CALCIUM mg/dL 8.2* 8.2* 9.2   ANION GAP mmol/L 14 17 16   EGFR mL/min/1.73m*2 64 52* 64   PHOSPHORUS mg/dL 3.3 4.1  --       Results from last 72 hours   Lab Units 12/13/24  0659 12/12/24  1609 12/11/24  1134   WBC AUTO x10*3/uL 12.5* 17.3* 23.4*   HEMOGLOBIN g/dL 11.3* 12.1 13.6   HEMATOCRIT % 34.5* 37.6 40.9   PLATELETS AUTO x10*3/uL 374 417 454*   NEUTROS PCT AUTO %  --   --  84.8   LYMPHS " "PCT AUTO %  --   --  9.2   MONOS PCT AUTO %  --   --  4.8   EOS PCT AUTO %  --   --  0.2      Lab Results   Component Value Date    CALCIUM 8.2 (L) 12/13/2024    PHOS 3.3 12/13/2024      No results found for: \"CRP\"   [unfilled]       Images  Transthoracic Echo (TTE) Complete     [facility], [address] [place] [address]                Tel [phone] and Fax [phone]    TRANSTHORACIC ECHOCARDIOGRAM REPORT       Patient Name:       DOREEN ALCALA ORALIA        Reading Physician:    [id number] Emery Tijerina MD  Study Date:         12/12/2024          Ordering Provider:    [id number] KAL WILKINSON  MRN/PID:            [id number]            Fellow:  Accession#:         [id number]        Nurse:                Elba Huffman RN  Date of Birth/Age:  1935 / 89 years Sonographer:          Jossie Seo RDCS  Gender assigned at  F                   Additional Staff:  Birth:  Height:             157.48 cm           Admit Date:           12/11/2024  Weight:             71.21 kg            Admission Status:     Inpatient -                                                                Routine  BSA / BMI:          1.72 m2 / 28.72     Encounter#:           [id number]                      kg/m2  Blood Pressure:     124/55 mmHg         Department Location:  [facility]                                                                Invasive    Study Type:    TRANSTHORACIC ECHO (TTE) COMPLETE  Diagnosis/ICD: Other transient cerebral ischemic attacks and related                 syndromes-G45.8  Indication:    Stroke  CPT Code:      Echo Complete w Full Doppler-01251    Patient History:  Diabetes:          Yes  Pertinent History: HTN, Hyperlipidemia and Cancer. CKD.    Study Detail: The following Echo studies were performed: 2D, " M-Mode, Doppler and                color flow. Technically challenging study due to patient lying in                supine position. Agitated saline used as a contrast agent for                intraseptal flow evaluation. The patient was asleep.       PHYSICIAN INTERPRETATION:  Left Ventricle: The left ventricular systolic function is normal, with a visually estimated ejection fraction of 60-65%. There are no regional left ventricular wall motion abnormalities. The left ventricular cavity size is normal. There is mildly increased septal and mildly increased posterior left ventricular wall thickness. There is left ventricular concentric remodeling. Spectral Doppler shows a Grade I (impaired relaxation pattern) of left ventricular diastolic filling with normal left atrial filling pressure.  Left Atrium: The left atrium is normal in size. There is no evidence of a patent foramen ovale.  Right Ventricle: The right ventricle is normal in size. There is normal right ventricular global systolic function.  Right Atrium: The right atrium is normal in size.  Aortic Valve: The aortic valve is trileaflet. There is evidence of mild aortic valve stenosis. The aortic valve dimensionless index is 0.64. There is mild aortic valve regurgitation. The peak instantaneous gradient of the aortic valve is 24 mmHg. The mean gradient of the aortic valve is 13 mmHg.  Mitral Valve: The mitral valve is normal in structure. There is mild mitral valve regurgitation.  Tricuspid Valve: The tricuspid valve is structurally normal. There is mild tricuspid regurgitation.  Pulmonic Valve: The pulmonic valve is structurally normal. There is mild pulmonic valve regurgitation.  Pericardium: There is no pericardial effusion noted.  Aorta: The aortic root is normal.  Pulmonary Artery: The tricuspid regurgitant velocity is 2.94 m/s, and with an estimated right atrial pressure of 10 mmHg, the estimated pulmonary artery pressure is mildly elevated with the RVSP  at 44.6 mmHg.  Systemic Veins: The inferior vena cava appears normal in size.       CONCLUSIONS:   1. The left ventricular systolic function is normal, with a visually estimated ejection fraction of 60-65%.   2. Spectral Doppler shows a Grade I (impaired relaxation pattern) of left ventricular diastolic filling with normal left atrial filling pressure.   3. There is normal right ventricular global systolic function.   4. Mild aortic valve stenosis.   5. Mild aortic valve regurgitation.   6. There is no evidence of a patent foramen ovale.   7. The estimated pulmonary artery pressure is mildly elevated with the RVSP at 44.6 mmHg.    QUANTITATIVE DATA SUMMARY:     2D MEASUREMENTS:          Normal Ranges:  LAs:             1.37 cm  (2.7-4.0cm)  IVSd:            1.00 cm  (0.6-1.1cm)  LVPWd:           1.11 cm  (0.6-1.1cm)  LVIDd:           3.62 cm  (3.9-5.9cm)  LVIDs:           2.36 cm  LV Mass Index:   68 g/m2  LVEDV Index:     24 ml/m2  LV % FS          34.7 %       LA VOLUME:                    Normal Ranges:  LA Vol A4C:        45.0 ml    (22+/-6mL/m2)  LA Vol A2C:        46.9 ml  LA Vol BP:         45.9 ml  LA Vol Index A4C:  26.1ml/m2  LA Vol Index A2C:  27.2 ml/m2  LA Vol Index BP:   26.6 ml/m2  LA Area A4C:       18.4 cm2  LA Area A2C:       18.8 cm2  LA Major Axis A4C: 6.4 cm  LA Major Axis A2C: 6.4 cm  LA Volume Index:   26.4 ml/m2  LA Vol A4C:        42.1 ml  LA Vol A2C:        45.3 ml  LA Vol Index BSA:  25.3 ml/m2       RA VOLUME BY A/L METHOD:          Normal Ranges:  RA Area A4C:             12.7 cm2       M-MODE MEASUREMENTS:         Normal Ranges:  Ao Root:             3.00 cm (2.0-3.7cm)  LAs:                 2.97 cm (2.7-4.0cm)       AORTA MEASUREMENTS:         Normal Ranges:  Ao Sinus, d:        2.90 cm (2.1-3.5cm)  Asc Ao, d:          3.20 cm (2.1-3.4cm)       LV SYSTOLIC FUNCTION BY 2D PLANIMETRY (MOD):                       Normal Ranges:  EF-A4C View:    68 % (>=55%)  EF-A2C View:    68  %  EF-Biplane:     69 %  EF-Visual:      63 %  LV EF Reported: 63 %       LV DIASTOLIC FUNCTION:             Normal Ranges:  MV Peak E:             0.63 m/s    (0.7-1.2 m/s)  MV Peak A:             0.89 m/s    (0.42-0.7 m/s)  E/A Ratio:             0.70        (1.0-2.2)  MV e'                  0.050 m/s   (>8.0)  MV lateral e'          0.06 m/s  MV medial e'           0.04 m/s  MV A Dur:              159.17 msec  E/e' Ratio:            12.57       (<8.0)  PulmV Sys Payam:         63.10 cm/s  PulmV Salazar Payam:        27.84 cm/s  PulmV S/D Payam:         2.27  PulmV A Revs Payam:      25.22 cm/s  PulmV A Revs Dur:      141.87 msec       MITRAL VALVE:          Normal Ranges:  MV DT:        286 msec (150-240msec)       AORTIC VALVE:                      Normal Ranges:  AoV Vmax:                2.47 m/s  (<=1.7m/s)  AoV Peak P.3 mmHg (<20mmHg)  AoV Mean P.8 mmHg (1.7-11.5mmHg)  LVOT Max Payam:            1.37 m/s  (<=1.1m/s)  AoV VTI:                 49.86 cm  (18-25cm)  LVOT VTI:                31.76 cm  LVOT Diameter:           1.96 cm   (1.8-2.4cm)  AoV Area, VTI:           1.92 cm2  (2.5-5.5cm2)  AoV Area,Vmax:           1.67 cm2  (2.5-4.5cm2)  AoV Dimensionless Index: 0.64       AORTIC INSUFFICIENCY:  AI Vmax:       2.98 m/s  AI Half-time:  512 msec  AI Decel Time: 1767 msec  AI Decel Rate: 168.58 cm/s2       RIGHT VENTRICLE:  RV Basal 2.90 cm  RV Mid   2.00 cm  RV Major 6.7 cm  TAPSE:   22.0 mm  RV s'    0.12 m/s       TRICUSPID VALVE/RVSP:          Normal Ranges:  Peak TR Velocity:     2.94 m/s  RV Syst Pressure:     45 mmHg  (< 30mmHg)  IVC Diam:             1.28 cm       PULMONIC VALVE:          Normal Ranges:  PV Max Payam:     0.9 m/s  (0.6-0.9m/s)  PV Max PG:      3.1 mmHg       Pulmonary Veins:  PulmV A Revs Dur: 141.87 msec  PulmV A Revs Payam: 25.22 cm/s  PulmV Salazar Payam:   27.84 cm/s  PulmV S/D Payam:    2.27  PulmV Sys Payam:    63.10 cm/s       AORTA:  Asc Ao Diam 3.21 cm       [id number]  Emery Tijerina MD  Electronically signed on 12/12/2024 at 4:03:44 PM       ** Final **  CT brain attack head wo IV contrast  Narrative: Interpreted By:  Orville Esquivel,   STUDY:  CT BRAIN ATTACK HEAD WO IV CONTRAST;  12/12/2024 10:57 am      INDICATION:  Signs/Symptoms:acute stroke.      COMPARISON:  Comparison studies from 12/11/2024.. Correlation with MRI from  12/11/2024.      ACCESSION NUMBER(S):  [id number]      ORDERING CLINICIAN:  KAL WILKINSON      TECHNIQUE:  Routine axial images were obtained from the skull base through the  vertex.  Sagittal and coronal reconstruction images were generated.  Brain, subdural, and bone windows were reviewed. N/A   N/A      FINDINGS:  INTRACRANIAL:  Mild-to-moderate prominence of ventricles and sulci. There is  mild-to-moderate patchy hypodensity throughout the deep  periventricular white matter. Small old lacunar infarct in the right  cerebellum. There is a localized grossly stable area of hypodensity  in the mid left parasagittal parietal white matter, consistent with  acute or subacute nonhemorrhagic infarct. There is no acute blood  density in the brain in this exam and there is no acute extra-axial  hematoma either in the current study. No midline shift. No  destructive bone lesion. No depressed skull fracture. Skullbase  arterial calcifications in the carotid siphons.      EXTRACRANIAL:  Visualized paranasal sinuses were clear.  Visualized mastoid air cells were clear.      Impression: Findings consistent with stable small acute to subacute  nonhemorrhagic infarct in the parasagittal mid left parietal white  matter just above the body of the left lateral ventricle.      No acute intracranial bleed based on today's exam. No midline shift.      Mild-to-moderate volume loss.      Mild-to-moderate chronic white matter ischemic disease in the deep  periventricular regions.      MACRO:  Orville Esquivel discussed the significance and urgency of this critical  finding by epic  secure chat with  KAL JJAA on 12/12/2024 at 11:11  am.  (**-RCF-**) Findings:  See findings.      Signed by: Orville Esquivel 12/12/2024 11:11 AM  Dictation workstation:   [id number]  ECG 12 lead  Normal sinus rhythm  Nonspecific ST and T wave abnormality  QTcB >= 480 msec  Abnormal ECG  When compared with ECG of 27-AUG-2014 13:17,  No significant change was found      Meds  Scheduled medications  [Held by provider] amLODIPine, 10 mg, oral, Daily  aspirin, 81 mg, oral, Daily  [Held by provider] atenolol, 50 mg, oral, Daily  atorvastatin, 80 mg, oral, Nightly  cefTRIAXone, 1 g, intravenous, q24h  [Held by provider] enoxaparin, 40 mg, subcutaneous, q24h  insulin lispro, 0-10 Units, subcutaneous, TID AC  [Held by provider] losartan, 25 mg, oral, Daily  perflutren lipid microspheres, 0.5-10 mL of dilution, intravenous, Once in imaging  perflutren protein A microsphere, 0.5 mL, intravenous, Once in imaging  sulfur hexafluoride microsphr, 2 mL, intravenous, Once in imaging      Continuous medications     PRN medications  PRN medications: acetaminophen **OR** acetaminophen **OR** acetaminophen, dextrose, dextrose, glucagon, glucagon, hydrALAZINE **FOLLOWED BY** hydrALAZINE, labetaloL, oxygen     Assessment and Plan    Soni Herrera is a 89 y.o. 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      Acute stroke  -CT of the head with acute left anterior cerebral artery infarct with no evidence for hemorrhage.  -ER provider spoke with Dr. Bill, neurology at [facility], who did not recommend any acute interventions.  -MRI of the brain with subacute ischemic infarct in the left anterior cerebral artery territory along the left parasagittal frontal lobe associated with mild petechial cortical hemorrhage.    -MRA of the brain and neck were also performed.  -Patient had an episode of increased weakness of the right upper extremity on 12/12.  Repeat CT scan was performed which revealed stable small acute to subacute nonhemorrhagic infarct in the parasagittal mid left parietal white matter just above the body of the left lateral ventricle. Dr. Ruiz, neurologist, was also made aware of the CT scan findings and I discussed the case with him as well at that time.  -Neurology recommending to switch from aspirin 81 mg daily, which patient reports she was taking prior to admission, to Plavix 75 mg daily.  Neurology recommends atorvastatin 80 mg nightly and Holter monitor upon discharge.  -Echo was performed on 12/12/2024 with no evidence of a patent foramen ovale.  -PT/OT are following.  -Allowing for permissive hypertension.  -Monitor.     UTI  -Continue IV Rocephin.  -Follow urine culture.   -Monitor.     Mildly elevated CPK  -Likely secondary to fall and patient being on the ground.  -Resolved.      Elevated troponin  -Likely secondary to demand ischemia.  Patient denies chest pain but is not a good historian of the last few days.  -I reviewed patient's EKG which reveals normal sinus rhythm with no acute ST-T changes  -Echo is pending.  -Cardiology following and patient will need further cardiac ambulatory monitoring on discharge.     Right lower extremity skin changes  -Likely secondary to fall.  Patient on Rocephin for UTI as well.  On exam today, there is only minor erythema if any at all and has improved. Will continue to monitor.       Hypomagnesemia  -Monitor and repeat labs today.     Lumbar spine deformity  -L4 chronic deformity found on CT scan.  Will monitor and follow-up with PCP as an outpatient.     Diabetes  -Holding home medication of metformin.  -HbA1c was 7.7 on 12/11/2024.  -Continue SSI and monitor.     Hypertension  -Holding home meds of amlodipine, losartan and atenolol to allow for permissive  hypertension in the setting of acute CVA.  -Monitor.     History of diffuse large B-cell lymphoma  -Recommend follow-up with her regular oncologist as an outpatient and was seen by Dr. Means on 2/16/24.      Hyperlipidemia  -Placed patient on atorvastatin 80 mg daily due to acute CVA.     Stage IIIa CKD  -Renal function is around baseline.  Monitor.     DVT prophylaxis  -SCDs and holding Lovenox subcu in the setting of petechial hemorrhages on the brain on MRI.

## 2024-12-14 LAB
ALBUMIN SERPL BCP-MCNC: 2.7 G/DL (ref 3.4–5)
ANION GAP SERPL CALC-SCNC: 15 MMOL/L (ref 10–20)
BUN SERPL-MCNC: 27 MG/DL (ref 6–23)
CALCIUM SERPL-MCNC: 8 MG/DL (ref 8.6–10.3)
CHLORIDE SERPL-SCNC: 104 MMOL/L (ref 98–107)
CO2 SERPL-SCNC: 27 MMOL/L (ref 21–32)
CREAT SERPL-MCNC: 0.75 MG/DL (ref 0.5–1.05)
EGFRCR SERPLBLD CKD-EPI 2021: 77 ML/MIN/1.73M*2
ERYTHROCYTE [DISTWIDTH] IN BLOOD BY AUTOMATED COUNT: 13.8 % (ref 11.5–14.5)
GLUCOSE BLD MANUAL STRIP-MCNC: 211 MG/DL (ref 74–99)
GLUCOSE BLD MANUAL STRIP-MCNC: 228 MG/DL (ref 74–99)
GLUCOSE BLD MANUAL STRIP-MCNC: 235 MG/DL (ref 74–99)
GLUCOSE BLD MANUAL STRIP-MCNC: 241 MG/DL (ref 74–99)
GLUCOSE SERPL-MCNC: 240 MG/DL (ref 74–99)
HCT VFR BLD AUTO: 35.1 % (ref 36–46)
HGB BLD-MCNC: 11 G/DL (ref 12–16)
MAGNESIUM SERPL-MCNC: 1.72 MG/DL (ref 1.6–2.4)
MCH RBC QN AUTO: 31.5 PG (ref 26–34)
MCHC RBC AUTO-ENTMCNC: 31.3 G/DL (ref 32–36)
MCV RBC AUTO: 101 FL (ref 80–100)
NRBC BLD-RTO: 0 /100 WBCS (ref 0–0)
PHOSPHATE SERPL-MCNC: 2.8 MG/DL (ref 2.5–4.9)
PLATELET # BLD AUTO: 364 X10*3/UL (ref 150–450)
POTASSIUM SERPL-SCNC: 4 MMOL/L (ref 3.5–5.3)
RBC # BLD AUTO: 3.49 X10*6/UL (ref 4–5.2)
SODIUM SERPL-SCNC: 142 MMOL/L (ref 136–145)
WBC # BLD AUTO: 14.4 X10*3/UL (ref 4.4–11.3)

## 2024-12-14 PROCEDURE — 2500000004 HC RX 250 GENERAL PHARMACY W/ HCPCS (ALT 636 FOR OP/ED): Performed by: INTERNAL MEDICINE

## 2024-12-14 PROCEDURE — 1200000002 HC GENERAL ROOM WITH TELEMETRY DAILY

## 2024-12-14 PROCEDURE — 2500000001 HC RX 250 WO HCPCS SELF ADMINISTERED DRUGS (ALT 637 FOR MEDICARE OP): Performed by: INTERNAL MEDICINE

## 2024-12-14 PROCEDURE — 85027 COMPLETE CBC AUTOMATED: CPT | Performed by: INTERNAL MEDICINE

## 2024-12-14 PROCEDURE — 2500000005 HC RX 250 GENERAL PHARMACY W/O HCPCS: Performed by: INTERNAL MEDICINE

## 2024-12-14 PROCEDURE — 99232 SBSQ HOSP IP/OBS MODERATE 35: CPT | Performed by: INTERNAL MEDICINE

## 2024-12-14 PROCEDURE — 94760 N-INVAS EAR/PLS OXIMETRY 1: CPT

## 2024-12-14 PROCEDURE — 80069 RENAL FUNCTION PANEL: CPT | Performed by: INTERNAL MEDICINE

## 2024-12-14 PROCEDURE — 36415 COLL VENOUS BLD VENIPUNCTURE: CPT | Performed by: INTERNAL MEDICINE

## 2024-12-14 PROCEDURE — 97110 THERAPEUTIC EXERCISES: CPT | Mod: GP

## 2024-12-14 PROCEDURE — 2500000002 HC RX 250 W HCPCS SELF ADMINISTERED DRUGS (ALT 637 FOR MEDICARE OP, ALT 636 FOR OP/ED): Performed by: INTERNAL MEDICINE

## 2024-12-14 PROCEDURE — 83735 ASSAY OF MAGNESIUM: CPT | Performed by: INTERNAL MEDICINE

## 2024-12-14 PROCEDURE — 82947 ASSAY GLUCOSE BLOOD QUANT: CPT

## 2024-12-14 PROCEDURE — 97530 THERAPEUTIC ACTIVITIES: CPT | Mod: GP

## 2024-12-14 RX ORDER — CIPROFLOXACIN 250 MG/1
250 TABLET, FILM COATED ORAL EVERY 12 HOURS SCHEDULED
Status: DISCONTINUED | OUTPATIENT
Start: 2024-12-14 | End: 2024-12-15

## 2024-12-14 ASSESSMENT — COGNITIVE AND FUNCTIONAL STATUS - GENERAL
CLIMB 3 TO 5 STEPS WITH RAILING: TOTAL
MOVING TO AND FROM BED TO CHAIR: A LOT
MOVING FROM LYING ON BACK TO SITTING ON SIDE OF FLAT BED WITH BEDRAILS: A LOT
TOILETING: A LOT
MOBILITY SCORE: 10
DRESSING REGULAR LOWER BODY CLOTHING: A LOT
MOVING TO AND FROM BED TO CHAIR: TOTAL
HELP NEEDED FOR BATHING: A LOT
PERSONAL GROOMING: A LITTLE
MOVING TO AND FROM BED TO CHAIR: A LOT
TURNING FROM BACK TO SIDE WHILE IN FLAT BAD: TOTAL
WALKING IN HOSPITAL ROOM: TOTAL
MOBILITY SCORE: 7
MOVING FROM LYING ON BACK TO SITTING ON SIDE OF FLAT BED WITH BEDRAILS: A LOT
WALKING IN HOSPITAL ROOM: A LOT
STANDING UP FROM CHAIR USING ARMS: A LOT
TURNING FROM BACK TO SIDE WHILE IN FLAT BAD: A LOT
STANDING UP FROM CHAIR USING ARMS: TOTAL
WALKING IN HOSPITAL ROOM: TOTAL
CLIMB 3 TO 5 STEPS WITH RAILING: A LOT
DAILY ACTIVITIY SCORE: 15
DRESSING REGULAR UPPER BODY CLOTHING: A LOT
HELP NEEDED FOR BATHING: A LOT
PERSONAL GROOMING: A LITTLE
DRESSING REGULAR LOWER BODY CLOTHING: A LOT
TOILETING: A LOT
CLIMB 3 TO 5 STEPS WITH RAILING: TOTAL
MOVING FROM LYING ON BACK TO SITTING ON SIDE OF FLAT BED WITH BEDRAILS: A LOT
DAILY ACTIVITIY SCORE: 15
DRESSING REGULAR UPPER BODY CLOTHING: A LOT
MOBILITY SCORE: 12
STANDING UP FROM CHAIR USING ARMS: A LOT
TURNING FROM BACK TO SIDE WHILE IN FLAT BAD: A LOT

## 2024-12-14 ASSESSMENT — PAIN SCALES - GENERAL
PAINLEVEL_OUTOF10: 0 - NO PAIN

## 2024-12-14 ASSESSMENT — PAIN - FUNCTIONAL ASSESSMENT
PAIN_FUNCTIONAL_ASSESSMENT: 0-10
PAIN_FUNCTIONAL_ASSESSMENT: 0-10

## 2024-12-14 NOTE — PROGRESS NOTES
Physical Therapy                 Therapy Communication Note    Patient Name: Soni Herrera  MRN: [id number]  Department: [address]  [facility]  Today's Date: 12/14/2024     Discipline: Physical Therapy    PT Missed Visit: Yes     Missed Visit Reason: Missed Visit Reason: Parent/caregiver refused (RN gave clearance for PT tx.  Upon arrival, pt. had just received her dinner and declined PT tx at this time.  Pt. acknowledged desire to eat her meal,but needed assist to place meal in her visual field (midline or toward L side), prep food.  Assisted pt until PCA available to take over with assist feeding.)    Missed Time: Attempt    Comment:  Time: 4488-7515

## 2024-12-14 NOTE — PROGRESS NOTES
12/14/24 1100   Discharge Planning   Expected Discharge Disposition SNF  ([facility] and [facility] starting precert today at time of this note)

## 2024-12-14 NOTE — CARE PLAN
The patient's goals for the shift include to go home    The clinical goals for the shift include pt will have no new neurological changes during shift      Problem: General Stroke  Goal: Establish a mutual long term goal with patient by discharge  Outcome: Progressing     Problem: General Stroke  Goal: Demonstrate improvement in neurological exam throughout the shift  Outcome: Progressing     Problem: General Stroke  Goal: Maintain BP within ordered limits throughout shift  Outcome: Progressing

## 2024-12-14 NOTE — PROGRESS NOTES
Physical Therapy    Physical Therapy Treatment    Patient Name: Soni Herrera  MRN: [id number]  Department: [address]  [facility]  Today's Date: 12/14/2024  Time Calculation  Start Time: 1610  Stop Time: 1638  Time Calculation (min): 28 min         Assessment/Plan   PT Assessment  PT Assessment Results: Decreased strength, Decreased endurance, Impaired balance, Decreased mobility  Rehab Prognosis: Fair  Barriers to Discharge Home: Physical needs  Physical Needs: 24hr mobility assistance needed  Evaluation/Treatment Tolerance: Patient tolerated treatment well  Medical Staff Made Aware: Yes  Strengths: Ability to acquire knowledge  Barriers to Participation: Comorbidities  End of Session Communication: Bedside nurse  Assessment Comment: Patient with significant R sided weakness specifially at trunk and R LE resulting in decreased functional mobility and increased assist. Continue to recommend MOD intensity PT intervention.  End of Session Patient Position: Bed, 3 rail up, Alarm on     PT Plan  Treatment/Interventions: Bed mobility, Transfer training, Gait training, Stair training, Balance training, Strengthening, Endurance training, Therapeutic exercise  PT Plan: Ongoing PT  PT Frequency: 3 times per week  PT Discharge Recommendations: Moderate intensity level of continued care  Equipment Recommended upon Discharge:  (TBD)  PT Recommended Transfer Status: Assist x2  PT - OK to Discharge: Yes      General Visit Information:   PT  Visit  PT Received On: 12/14/24  Response to Previous Treatment: Patient with no complaints from previous session.  General  Reason for Referral: 88 yo female admit with acute ischemic left ANASTACIO stroke; referred to PT for impaired functional mobility  Referred By: Dorian Barry  Past Medical History Relevant to Rehab: history of diabetes, hypertension, hyperlipidemia, stage IIIa CKD, 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  Family/Caregiver  Present: No (Family present initially but stepped out of the room during tx)  Prior to Session Communication: Bedside nurse, PCT/NA/CTA  Patient Position Received: Bed, 3 rail up, Alarm on  General Comment: Patient pleasant, cooperative and agreeable to therapy tx    Subjective   Precautions:  Precautions  Medical Precautions: Fall precautions  Precautions Comment: tony sheriff B SCD's, pillow positioned for heel pressure relief    Vital Signs (Past 2hrs)        Date/Time Vitals Session Patient Position Pulse Resp SpO2 BP MAP (mmHg)    12/14/24 1645 --  --  66  18  95 %  --  --     12/14/24 1659 --  --  --  --  --  162/72  --                         Objective   Pain:  Pain Assessment  Pain Assessment: 0-10  0-10 (Numeric) Pain Score: 0 - No pain  Cognition:  Cognition  Overall Cognitive Status: Within Functional Limits  Orientation Level: Oriented X4  Coordination:  Movements are Fluid and Coordinated: No  Coordination Comment: motor control in LLE significantly impaired - severe weakness contributing factor       Activity Tolerance:  Activity Tolerance  Endurance: Tolerates 30 min exercise with multiple rests  Early Mobility/Exercise Safety Screen: Proceed with mobilization - No exclusion criteria met  Treatments:  Therapeutic Exercise  Therapeutic Exercise Performed: Yes  Therapeutic Exercise Activity 1: LLE and Faciliated RLE supine exercises x10-15 reps each: ankle DF/PF, hip flex w/knee flex, facilitated knee ext from hip flex with knee flex 90/90 position, hooklying trunk rotation and hip abd/add.  Pt. required max to total assist for all RLE exercises.  Deep pressure and vibration to facilitate RLE muscle activation completed.    Therapeutic Activity  Therapeutic Activity Performed: Yes  Therapeutic Activity 1: Completed bed mobility training with focus on rolling L+R x 7 reps each direction. Verbal/Tactile cues required for positioning, to facilitate inproved muscle activation and performance.  Pt.  required Mod A to roll toward R, MaxA to roll toward L with assist to position and move RLE During rolling training, pt. became incontinent of bowel, assisted pt. with pericare during session.  Rn made aware    Bed Mobility  Bed Mobility: Yes  Bed Mobility 1  Bed Mobility 1: Rolling right  Level of Assistance 1: Moderate assistance  Bed Mobility Comments 1: x7 trials  Bed Mobility 2  Bed Mobility  2: Rolling left  Level of Assistance 2: Maximum assistance, Moderate verbal cues, Moderate tactile cues  Bed Mobility Comments 2: x7 trials positioning of RLE required; instruction in proper technique  Bed Mobility 3  Bed Mobility 3: Scooting  Level of Assistance 3: Maximum assistance (x2)    Ambulation/Gait Training  Ambulation/Gait Training Performed: No  Transfers  Transfer: No (Unable to safely attempt supine/sit mobility or transfer this date d/t fatigue from other bed moblity training and faciliated ther ex.)    Outcome Measures:  [facility] Basic Mobility  Turning from your back to your side while in a flat bed without using bedrails: A lot  Moving from lying on your back to sitting on the side of a flat bed without using bedrails: Total  Moving to and from bed to chair (including a wheelchair): Total  Standing up from a chair using your arms (e.g. wheelchair or bedside chair): Total  To walk in hospital room: Total  Climbing 3-5 steps with railing: Total  Basic Mobility - Total Score: 7    Education Documentation  Body Mechanics, taught by Apple Lang, PT at 12/14/2024  5:39 PM.  Learner: Patient  Readiness: Acceptance  Method: Explanation  Response: Verbalizes Understanding, Needs Reinforcement    Mobility Training, taught by Apple Lagn, PT at 12/14/2024  5:39 PM.  Learner: Patient  Readiness: Acceptance  Method: Explanation  Response: Verbalizes Understanding, Needs Reinforcement    Education Comments  No comments found.        OP EDUCATION:       Encounter Problems       Encounter Problems  (Active)       Balance       STG - Maintains static standing balance with upper extremity support x 3' min A  (Progressing)       Start:  12/13/24    Expected End:  12/27/24            STG - Maintains dynamic sitting balance with upper extremity support x 10' supervision  (Progressing)       Start:  12/13/24    Expected End:  12/27/24               Mobility       STG - Patient will ambulate with LRD 10' min A  (Progressing)       Start:  12/13/24    Expected End:  12/27/24            STG - Patient will ascend and descend 4 stairs B rails min A  (Progressing)       Start:  12/13/24    Expected End:  12/27/24               PT Transfers       STG - Patient will perform bed mobility min A   (Progressing)       Start:  12/13/24    Expected End:  12/27/24            STG - Patient will transfer sit to and from stand min A   (Progressing)       Start:  12/13/24    Expected End:  12/27/24

## 2024-12-14 NOTE — CARE PLAN
Problem: General Stroke  Goal: Participate in treatment (ie., meds, therapy) throughout shift  Outcome: Progressing  Goal: No symptoms of aspiration throughout shift  Outcome: Progressing   The patient's goals for the shift include to go home    The clinical goals for the shift include to be able to rest comfortably throughout the shift

## 2024-12-14 NOTE — PROGRESS NOTES
"  Subjective    Reports right leg is still weak and not improved.  She reports right upper extremity is doing well.  She denies any weakness of the left upper or lower extremities.  Patient denies chest pain, shortness of breath, nausea, vomiting or diarrhea.    Objective    Vitals  Visit Vitals  /70   Pulse 81   Temp 36.4 °C (97.5 °F)   Resp 16   Ht 1.575 m (5' 2\")   Wt 71.6 kg (157 lb 13.6 oz)   SpO2 95%   BMI 28.87 kg/m²   Smoking Status Never   BSA 1.77 m²       Physical Exam   General: Patient is alert and oriented to self, bday, president, yr, age and month.  NAD.     HEENT: Clear sclera.  CVS: RRR.  Lungs: CTAB.   Abdomen: Soft.  NT. +BS.   Extremities: No pitting edema bilateral ankles. Left, second toe with ecchymosis.    Neurologic: NIH stroke score is 3. (3 points for right leg weakness).   Psychiatric: Cooperative.     IOs    Intake/Output Summary (Last 24 hours) at 12/14/2024 1242  Last data filed at 12/14/2024 0933  Gross per 24 hour   Intake 720 ml   Output 1100 ml   Net -380 ml       Labs:   Results from last 72 hours   Lab Units 12/14/24  0654 12/13/24  0659 12/12/24  1609   SODIUM mmol/L 142 138 137   POTASSIUM mmol/L 4.0 3.8 3.4*   CHLORIDE mmol/L 104 105 100   CO2 mmol/L 27 23 23   BUN mg/dL 27* 44* 48*   CREATININE mg/dL 0.75 0.87 1.04   GLUCOSE mg/dL 240* 215* 299*   CALCIUM mg/dL 8.0* 8.2* 8.2*   ANION GAP mmol/L 15 14 17   EGFR mL/min/1.73m*2 77 64 52*   PHOSPHORUS mg/dL 2.8 3.3 4.1      Results from last 72 hours   Lab Units 12/14/24  0654 12/13/24  0659 12/12/24  1609   WBC AUTO x10*3/uL 14.4* 12.5* 17.3*   HEMOGLOBIN g/dL 11.0* 11.3* 12.1   HEMATOCRIT % 35.1* 34.5* 37.6   PLATELETS AUTO x10*3/uL 364 374 417      Lab Results   Component Value Date    CALCIUM 8.0 (L) 12/14/2024    PHOS 2.8 12/14/2024      No results found for: \"CRP\"   [unfilled]       Images  Transthoracic Echo (TTE) [facility], [address] [place] [address]                Tel " [phone] and Fax [phone]    TRANSTHORACIC ECHOCARDIOGRAM REPORT       Patient Name:       DOREEN BARTON        Reading Physician:    [id number] Emery Tijerina MD  Study Date:         12/12/2024          Ordering Provider:    [id number] KAL REYNOSO                                                                JAJA  MRN/PID:            [id number]            Fellow:  Accession#:         [id number]        Nurse:                Elba Huffman RN  Date of Birth/Age:  1935 / 89 years Sonographer:          Jossie Seo RDCS  Gender assigned at  F                   Additional Staff:  Birth:  Height:             157.48 cm           Admit Date:           12/11/2024  Weight:             71.21 kg            Admission Status:     Inpatient -                                                                Routine  BSA / BMI:          1.72 m2 / 28.72     Encounter#:           [id number]                      kg/m2  Blood Pressure:     124/55 mmHg         Department Location:  [facility] Non                                                                Invasive    Study Type:    TRANSTHORACIC ECHO (TTE) COMPLETE  Diagnosis/ICD: Other transient cerebral ischemic attacks and related                 syndromes-G45.8  Indication:    Stroke  CPT Code:      Echo Complete w Full Doppler-08361    Patient History:  Diabetes:          Yes  Pertinent History: HTN, Hyperlipidemia and Cancer. CKD.    Study Detail: The following Echo studies were performed: 2D, M-Mode, Doppler and                color flow. Technically challenging study due to patient lying in                supine position. Agitated saline used as a contrast agent for                intraseptal flow evaluation. The patient was asleep.       PHYSICIAN INTERPRETATION:  Left Ventricle: The left ventricular systolic function is normal, with a visually  estimated ejection fraction of 60-65%. There are no regional left ventricular wall motion abnormalities. The left ventricular cavity size is normal. There is mildly increased septal and mildly increased posterior left ventricular wall thickness. There is left ventricular concentric remodeling. Spectral Doppler shows a Grade I (impaired relaxation pattern) of left ventricular diastolic filling with normal left atrial filling pressure.  Left Atrium: The left atrium is normal in size. There is no evidence of a patent foramen ovale.  Right Ventricle: The right ventricle is normal in size. There is normal right ventricular global systolic function.  Right Atrium: The right atrium is normal in size.  Aortic Valve: The aortic valve is trileaflet. There is evidence of mild aortic valve stenosis. The aortic valve dimensionless index is 0.64. There is mild aortic valve regurgitation. The peak instantaneous gradient of the aortic valve is 24 mmHg. The mean gradient of the aortic valve is 13 mmHg.  Mitral Valve: The mitral valve is normal in structure. There is mild mitral valve regurgitation.  Tricuspid Valve: The tricuspid valve is structurally normal. There is mild tricuspid regurgitation.  Pulmonic Valve: The pulmonic valve is structurally normal. There is mild pulmonic valve regurgitation.  Pericardium: There is no pericardial effusion noted.  Aorta: The aortic root is normal.  Pulmonary Artery: The tricuspid regurgitant velocity is 2.94 m/s, and with an estimated right atrial pressure of 10 mmHg, the estimated pulmonary artery pressure is mildly elevated with the RVSP at 44.6 mmHg.  Systemic Veins: The inferior vena cava appears normal in size.       CONCLUSIONS:   1. The left ventricular systolic function is normal, with a visually estimated ejection fraction of 60-65%.   2. Spectral Doppler shows a Grade I (impaired relaxation pattern) of left ventricular diastolic filling with normal left atrial filling pressure.   3.  There is normal right ventricular global systolic function.   4. Mild aortic valve stenosis.   5. Mild aortic valve regurgitation.   6. There is no evidence of a patent foramen ovale.   7. The estimated pulmonary artery pressure is mildly elevated with the RVSP at 44.6 mmHg.    QUANTITATIVE DATA SUMMARY:     2D MEASUREMENTS:          Normal Ranges:  LAs:             1.37 cm  (2.7-4.0cm)  IVSd:            1.00 cm  (0.6-1.1cm)  LVPWd:           1.11 cm  (0.6-1.1cm)  LVIDd:           3.62 cm  (3.9-5.9cm)  LVIDs:           2.36 cm  LV Mass Index:   68 g/m2  LVEDV Index:     24 ml/m2  LV % FS          34.7 %       LA VOLUME:                    Normal Ranges:  LA Vol A4C:        45.0 ml    (22+/-6mL/m2)  LA Vol A2C:        46.9 ml  LA Vol BP:         45.9 ml  LA Vol Index A4C:  26.1ml/m2  LA Vol Index A2C:  27.2 ml/m2  LA Vol Index BP:   26.6 ml/m2  LA Area A4C:       18.4 cm2  LA Area A2C:       18.8 cm2  LA Major Axis A4C: 6.4 cm  LA Major Axis A2C: 6.4 cm  LA Volume Index:   26.4 ml/m2  LA Vol A4C:        42.1 ml  LA Vol A2C:        45.3 ml  LA Vol Index BSA:  25.3 ml/m2       RA VOLUME BY A/L METHOD:          Normal Ranges:  RA Area A4C:             12.7 cm2       M-MODE MEASUREMENTS:         Normal Ranges:  Ao Root:             3.00 cm (2.0-3.7cm)  LAs:                 2.97 cm (2.7-4.0cm)       AORTA MEASUREMENTS:         Normal Ranges:  Ao Sinus, d:        2.90 cm (2.1-3.5cm)  Asc Ao, d:          3.20 cm (2.1-3.4cm)       LV SYSTOLIC FUNCTION BY 2D PLANIMETRY (MOD):                       Normal Ranges:  EF-A4C View:    68 % (>=55%)  EF-A2C View:    68 %  EF-Biplane:     69 %  EF-Visual:      63 %  LV EF Reported: 63 %       LV DIASTOLIC FUNCTION:             Normal Ranges:  MV Peak E:             0.63 m/s    (0.7-1.2 m/s)  MV Peak A:             0.89 m/s    (0.42-0.7 m/s)  E/A Ratio:             0.70        (1.0-2.2)  MV e'                  0.050 m/s   (>8.0)  MV lateral e'          0.06 m/s  MV medial e'            0.04 m/s  MV A Dur:              159.17 msec  E/e' Ratio:            12.57       (<8.0)  PulmV Sys Payam:         63.10 cm/s  PulmV Salazar Payam:        27.84 cm/s  PulmV S/D Payam:         2.27  PulmV A Revs Payam:      25.22 cm/s  PulmV A Revs Dur:      141.87 msec       MITRAL VALVE:          Normal Ranges:  MV DT:        286 msec (150-240msec)       AORTIC VALVE:                      Normal Ranges:  AoV Vmax:                2.47 m/s  (<=1.7m/s)  AoV Peak P.3 mmHg (<20mmHg)  AoV Mean P.8 mmHg (1.7-11.5mmHg)  LVOT Max Pyaam:            1.37 m/s  (<=1.1m/s)  AoV VTI:                 49.86 cm  (18-25cm)  LVOT VTI:                31.76 cm  LVOT Diameter:           1.96 cm   (1.8-2.4cm)  AoV Area, VTI:           1.92 cm2  (2.5-5.5cm2)  AoV Area,Vmax:           1.67 cm2  (2.5-4.5cm2)  AoV Dimensionless Index: 0.64       AORTIC INSUFFICIENCY:  AI Vmax:       2.98 m/s  AI Half-time:  512 msec  AI Decel Time: 1767 msec  AI Decel Rate: 168.58 cm/s2       RIGHT VENTRICLE:  RV Basal 2.90 cm  RV Mid   2.00 cm  RV Major 6.7 cm  TAPSE:   22.0 mm  RV s'    0.12 m/s       TRICUSPID VALVE/RVSP:          Normal Ranges:  Peak TR Velocity:     2.94 m/s  RV Syst Pressure:     45 mmHg  (< 30mmHg)  IVC Diam:             1.28 cm       PULMONIC VALVE:          Normal Ranges:  PV Max Payam:     0.9 m/s  (0.6-0.9m/s)  PV Max PG:      3.1 mmHg       Pulmonary Veins:  PulmV A Revs Dur: 141.87 msec  PulmV A Revs Payam: 25.22 cm/s  PulmV Salazar Payam:   27.84 cm/s  PulmV S/D Payam:    2.27  PulmV Sys Payam:    63.10 cm/s       AORTA:  Asc Ao Diam 3.21 cm       [id number] Emery Tijerina MD  Electronically signed on 2024 at 4:03:44 PM       ** Final **  CT brain attack head wo IV contrast  Narrative: Interpreted By:  Orville Esquivel,   STUDY:  CT BRAIN ATTACK HEAD WO IV CONTRAST;  2024 10:57 am      INDICATION:  Signs/Symptoms:acute stroke.      COMPARISON:  Comparison studies from 2024.. Correlation with MRI  from  12/11/2024.      ACCESSION NUMBER(S):  [id number]      ORDERING CLINICIAN:  KAL WILKINSON      TECHNIQUE:  Routine axial images were obtained from the skull base through the  vertex.  Sagittal and coronal reconstruction images were generated.  Brain, subdural, and bone windows were reviewed. N/A   N/A      FINDINGS:  INTRACRANIAL:  Mild-to-moderate prominence of ventricles and sulci. There is  mild-to-moderate patchy hypodensity throughout the deep  periventricular white matter. Small old lacunar infarct in the right  cerebellum. There is a localized grossly stable area of hypodensity  in the mid left parasagittal parietal white matter, consistent with  acute or subacute nonhemorrhagic infarct. There is no acute blood  density in the brain in this exam and there is no acute extra-axial  hematoma either in the current study. No midline shift. No  destructive bone lesion. No depressed skull fracture. Skullbase  arterial calcifications in the carotid siphons.      EXTRACRANIAL:  Visualized paranasal sinuses were clear.  Visualized mastoid air cells were clear.      Impression: Findings consistent with stable small acute to subacute  nonhemorrhagic infarct in the parasagittal mid left parietal white  matter just above the body of the left lateral ventricle.      No acute intracranial bleed based on today's exam. No midline shift.      Mild-to-moderate volume loss.      Mild-to-moderate chronic white matter ischemic disease in the deep  periventricular regions.      MACRO:  Orville Esquivel discussed the significance and urgency of this critical  finding by epic secure chat with  KAL WILKINSON on 12/12/2024 at 11:11  am.  (**-RCF-**) Findings:  See findings.      Signed by: Orville Esquivel 12/12/2024 11:11 AM  Dictation workstation:   [id number]  ECG 12 lead  Normal sinus rhythm  Nonspecific ST and T wave abnormality  QTcB >= 480 msec  Abnormal ECG  When compared with ECG of 27-AUG-2014 13:17,  No significant change was  found      Meds  Scheduled medications  [Held by provider] amLODIPine, 10 mg, oral, Daily  atenolol, 50 mg, oral, Daily  atorvastatin, 80 mg, oral, Nightly  cefTRIAXone, 1 g, intravenous, q24h  clopidogrel, 75 mg, oral, Daily  [Held by provider] enoxaparin, 40 mg, subcutaneous, q24h  insulin lispro, 0-10 Units, subcutaneous, TID AC  losartan, 25 mg, oral, Daily  perflutren lipid microspheres, 0.5-10 mL of dilution, intravenous, Once in imaging  perflutren protein A microsphere, 0.5 mL, intravenous, Once in imaging  sulfur hexafluoride microsphr, 2 mL, intravenous, Once in imaging      Continuous medications     PRN medications  PRN medications: acetaminophen **OR** acetaminophen **OR** acetaminophen, dextrose, dextrose, glucagon, glucagon, [] hydrALAZINE **FOLLOWED BY** hydrALAZINE, oxygen     Assessment and Plan    Soni Herrera is a 89 y.o. 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      Acute stroke  -CT of the head with acute left anterior cerebral artery infarct with no evidence for hemorrhage.  -ER provider spoke with Dr. Bill, neurology at [facility], who did not recommend any acute interventions.  -MRI of the brain with subacute ischemic infarct in the left anterior cerebral artery territory along the left parasagittal frontal lobe associated with mild petechial cortical hemorrhage.   -MRA of the brain and neck were also performed.  -Patient had an episode of increased weakness of the right upper extremity on .  Repeat CT scan was performed which revealed stable small acute to subacute nonhemorrhagic infarct in the parasagittal mid left parietal white matter just above the body of the left lateral ventricle. Dr. Ruiz, neurologist, was also made aware of the CT scan  findings and I discussed the case with him as well at that time.  -Neurology recommended to switch from aspirin 81 mg daily, which patient reports she was taking prior to admission, to Plavix 75 mg daily.  Neurology recommends atorvastatin 80 mg nightly and Holter monitor upon discharge.  -Echo was performed on 12/12/2024 with no evidence of a patent foramen ovale.  -PT/OT are following.  -We will restart atenolol and losartan and currently still holding amlodipine, and BP meds have been on hold due to allowing for permissive hypertension.  -Monitor.    Leukocytosis  -WBC up to 14.4 today; 12.5 on 12/13.  Not clear why WBCs uptrending.  Patient on Rocephin for E. coli, which appears to be pansensitive although Rocephin not listed specifically, but sensitive to cefazolin.  Will change antibiotics to renally dosed Cipro.  Patient afebrile as well.   -Monitor.      UTI  -Urine culture grew E. coli.  -As stated above, we will change Rocephin to renally dosed Cipro.  -Monitor.     Mildly elevated CPK  -Likely secondary to fall and patient being on the ground.  -Resolved.      Elevated troponin  -Likely secondary to demand ischemia.  Patient denies chest pain but is not a good historian of the last few days.  -I reviewed patient's EKG which reveals normal sinus rhythm with no acute ST-T changes  -Echo is pending.  -Cardiology following and patient will need further cardiac ambulatory monitoring on discharge.     Right lower extremity skin changes  -Likely secondary to fall.  Patient on Rocephin for UTI as well.    -Monitor.    Hypomagnesemia  -Resolved.  Monitor.       Lumbar spine deformity  -L4 chronic deformity found on CT scan.  Will monitor and follow-up with PCP as an outpatient.     Diabetes  -Holding home medication of metformin.  -HbA1c was 7.7 on 12/11/2024.  -Continue SSI and monitor.     Hypertension  -We had been holding home meds of amlodipine, losartan and atenolol to allow for permissive hypertension in  the setting of acute CVA.  -Restart losartan, atenolol and continue to hold amlodipine for now but possibly restart soon.  -Monitor.     History of diffuse large B-cell lymphoma  -Recommend follow-up with her regular oncologist as an outpatient and was seen by Dr. Means on 2/16/24.      Hyperlipidemia  -Placed patient on atorvastatin 80 mg daily due to acute CVA.     Stage IIIa CKD  -Renal function is around baseline.  Monitor.     DVT prophylaxis  -SCDs and holding Lovenox subcu in the setting of petechial hemorrhages on the brain on MRI.

## 2024-12-15 ENCOUNTER — APPOINTMENT (OUTPATIENT)
Dept: RADIOLOGY | Facility: HOSPITAL | Age: 89
DRG: 065 | End: 2024-12-15
Payer: MEDICARE

## 2024-12-15 VITALS
RESPIRATION RATE: 16 BRPM | SYSTOLIC BLOOD PRESSURE: 136 MMHG | DIASTOLIC BLOOD PRESSURE: 68 MMHG | HEART RATE: 68 BPM | BODY MASS INDEX: 29.05 KG/M2 | TEMPERATURE: 98.1 F | OXYGEN SATURATION: 93 % | WEIGHT: 157.85 LBS | HEIGHT: 62 IN

## 2024-12-15 LAB
ALBUMIN SERPL BCP-MCNC: 2.9 G/DL (ref 3.4–5)
ALP SERPL-CCNC: 66 U/L (ref 33–136)
ALT SERPL W P-5'-P-CCNC: 17 U/L (ref 7–45)
ANION GAP SERPL CALC-SCNC: 12 MMOL/L (ref 10–20)
AST SERPL W P-5'-P-CCNC: 22 U/L (ref 9–39)
BILIRUB SERPL-MCNC: 0.5 MG/DL (ref 0–1.2)
BUN SERPL-MCNC: 22 MG/DL (ref 6–23)
CALCIUM SERPL-MCNC: 8.3 MG/DL (ref 8.6–10.3)
CHLORIDE SERPL-SCNC: 106 MMOL/L (ref 98–107)
CO2 SERPL-SCNC: 25 MMOL/L (ref 21–32)
CREAT SERPL-MCNC: 0.59 MG/DL (ref 0.5–1.05)
EGFRCR SERPLBLD CKD-EPI 2021: 87 ML/MIN/1.73M*2
ERYTHROCYTE [DISTWIDTH] IN BLOOD BY AUTOMATED COUNT: 13.4 % (ref 11.5–14.5)
GLUCOSE BLD MANUAL STRIP-MCNC: 164 MG/DL (ref 74–99)
GLUCOSE BLD MANUAL STRIP-MCNC: 197 MG/DL (ref 74–99)
GLUCOSE BLD MANUAL STRIP-MCNC: 212 MG/DL (ref 74–99)
GLUCOSE BLD MANUAL STRIP-MCNC: 216 MG/DL (ref 74–99)
GLUCOSE SERPL-MCNC: 214 MG/DL (ref 74–99)
HCT VFR BLD AUTO: 34.8 % (ref 36–46)
HGB BLD-MCNC: 11.2 G/DL (ref 12–16)
MAGNESIUM SERPL-MCNC: 1.61 MG/DL (ref 1.6–2.4)
MCH RBC QN AUTO: 31.5 PG (ref 26–34)
MCHC RBC AUTO-ENTMCNC: 32.2 G/DL (ref 32–36)
MCV RBC AUTO: 98 FL (ref 80–100)
NRBC BLD-RTO: 0 /100 WBCS (ref 0–0)
PHOSPHATE SERPL-MCNC: 2.5 MG/DL (ref 2.5–4.9)
PLATELET # BLD AUTO: 393 X10*3/UL (ref 150–450)
POTASSIUM SERPL-SCNC: 3.8 MMOL/L (ref 3.5–5.3)
PROT SERPL-MCNC: 5.9 G/DL (ref 6.4–8.2)
RBC # BLD AUTO: 3.56 X10*6/UL (ref 4–5.2)
SODIUM SERPL-SCNC: 139 MMOL/L (ref 136–145)
WBC # BLD AUTO: 16.2 X10*3/UL (ref 4.4–11.3)

## 2024-12-15 PROCEDURE — 97530 THERAPEUTIC ACTIVITIES: CPT | Mod: GO,CO

## 2024-12-15 PROCEDURE — 9420000001 HC RT PATIENT EDUCATION 5 MIN

## 2024-12-15 PROCEDURE — 80053 COMPREHEN METABOLIC PANEL: CPT | Performed by: INTERNAL MEDICINE

## 2024-12-15 PROCEDURE — 94760 N-INVAS EAR/PLS OXIMETRY 1: CPT

## 2024-12-15 PROCEDURE — 71045 X-RAY EXAM CHEST 1 VIEW: CPT | Performed by: RADIOLOGY

## 2024-12-15 PROCEDURE — 2500000004 HC RX 250 GENERAL PHARMACY W/ HCPCS (ALT 636 FOR OP/ED): Performed by: INTERNAL MEDICINE

## 2024-12-15 PROCEDURE — 84145 PROCALCITONIN (PCT): CPT | Mod: GEALAB | Performed by: INTERNAL MEDICINE

## 2024-12-15 PROCEDURE — 84100 ASSAY OF PHOSPHORUS: CPT | Performed by: INTERNAL MEDICINE

## 2024-12-15 PROCEDURE — 2500000005 HC RX 250 GENERAL PHARMACY W/O HCPCS: Performed by: INTERNAL MEDICINE

## 2024-12-15 PROCEDURE — 85027 COMPLETE CBC AUTOMATED: CPT | Performed by: INTERNAL MEDICINE

## 2024-12-15 PROCEDURE — 99232 SBSQ HOSP IP/OBS MODERATE 35: CPT | Performed by: INTERNAL MEDICINE

## 2024-12-15 PROCEDURE — 36415 COLL VENOUS BLD VENIPUNCTURE: CPT | Performed by: INTERNAL MEDICINE

## 2024-12-15 PROCEDURE — 1200000002 HC GENERAL ROOM WITH TELEMETRY DAILY

## 2024-12-15 PROCEDURE — 83735 ASSAY OF MAGNESIUM: CPT | Performed by: INTERNAL MEDICINE

## 2024-12-15 PROCEDURE — 82947 ASSAY GLUCOSE BLOOD QUANT: CPT

## 2024-12-15 PROCEDURE — 2500000002 HC RX 250 W HCPCS SELF ADMINISTERED DRUGS (ALT 637 FOR MEDICARE OP, ALT 636 FOR OP/ED): Performed by: INTERNAL MEDICINE

## 2024-12-15 PROCEDURE — 2500000001 HC RX 250 WO HCPCS SELF ADMINISTERED DRUGS (ALT 637 FOR MEDICARE OP): Performed by: INTERNAL MEDICINE

## 2024-12-15 PROCEDURE — 71045 X-RAY EXAM CHEST 1 VIEW: CPT

## 2024-12-15 RX ORDER — HYDRALAZINE HYDROCHLORIDE 25 MG/1
25 TABLET, FILM COATED ORAL EVERY 6 HOURS PRN
Status: DISCONTINUED | OUTPATIENT
Start: 2024-12-15 | End: 2024-12-18 | Stop reason: HOSPADM

## 2024-12-15 ASSESSMENT — COGNITIVE AND FUNCTIONAL STATUS - GENERAL
PERSONAL GROOMING: A LITTLE
HELP NEEDED FOR BATHING: A LITTLE
MOVING FROM LYING ON BACK TO SITTING ON SIDE OF FLAT BED WITH BEDRAILS: A LOT
DAILY ACTIVITIY SCORE: 17
HELP NEEDED FOR BATHING: A LOT
DRESSING REGULAR UPPER BODY CLOTHING: A LOT
WALKING IN HOSPITAL ROOM: TOTAL
TURNING FROM BACK TO SIDE WHILE IN FLAT BAD: A LOT
MOVING TO AND FROM BED TO CHAIR: A LOT
PERSONAL GROOMING: A LITTLE
TOILETING: TOTAL
WALKING IN HOSPITAL ROOM: TOTAL
DAILY ACTIVITIY SCORE: 15
TOILETING: A LOT
MOVING TO AND FROM BED TO CHAIR: A LOT
PERSONAL GROOMING: A LITTLE
HELP NEEDED FOR BATHING: A LOT
MOBILITY SCORE: 10
MOVING FROM LYING ON BACK TO SITTING ON SIDE OF FLAT BED WITH BEDRAILS: A LOT
STANDING UP FROM CHAIR USING ARMS: A LOT
DRESSING REGULAR UPPER BODY CLOTHING: A LOT
DAILY ACTIVITIY SCORE: 12
TURNING FROM BACK TO SIDE WHILE IN FLAT BAD: A LOT
DRESSING REGULAR LOWER BODY CLOTHING: TOTAL
DRESSING REGULAR LOWER BODY CLOTHING: A LOT
EATING MEALS: A LITTLE
DRESSING REGULAR UPPER BODY CLOTHING: A LITTLE
STANDING UP FROM CHAIR USING ARMS: A LOT
MOBILITY SCORE: 10
CLIMB 3 TO 5 STEPS WITH RAILING: TOTAL
TOILETING: A LOT
DRESSING REGULAR LOWER BODY CLOTHING: A LOT
CLIMB 3 TO 5 STEPS WITH RAILING: TOTAL

## 2024-12-15 ASSESSMENT — PAIN SCALES - GENERAL
PAINLEVEL_OUTOF10: 0 - NO PAIN
PAINLEVEL_OUTOF10: 0 - NO PAIN

## 2024-12-15 ASSESSMENT — PAIN - FUNCTIONAL ASSESSMENT: PAIN_FUNCTIONAL_ASSESSMENT: 0-10

## 2024-12-15 NOTE — CONSULTS
Consults  Referred by EVA Barry    Primary MD: Jocelyn Bowens, APRN-CNP    Reason For Consult  UTI / possible pneumonia / CVA    History Of Present Illness  Soni Herrera is a 89 y.o. female, hx of DM, hx of HTN, hx of CKD, hx of lymphoma sp chemotherapy, hx of splenectomy, she was admitted after she was found on the floor at home with emesis, she was confused, she was dx with CVA, the WBC were up UA with pyuria, the cxr showed LLL infiltrate / ? Possible RUL infiltrate, the urine showed E. Coli, no fever, no cough or sob, no chest pain, no dysuria or hematuria, no abdominal pain or diarrhea     Past Medical History  She has a past medical history of Left upper quadrant pain (04/03/2014), Pallor (04/03/2014), Personal history of other specified conditions (03/13/2014), and Personal history of other specified conditions (04/03/2014).    Surgical History  She has a past surgical history that includes Total abdominal hysterectomy w/ bilateral salpingoophorectomy (03/13/2014) and Tonsillectomy (03/13/2014).     Social History     Occupational History    Not on file   Tobacco Use    Smoking status: Never    Smokeless tobacco: Never   Vaping Use    Vaping status: Never Used   Substance and Sexual Activity    Alcohol use: Never    Drug use: Never    Sexual activity: Not on file     Travel History   Travel since 11/15/24    No documented travel since 11/15/24          Family History  Family History   Problem Relation Name Age of Onset    Heart disease Mother      Hypertension Mother      Prostate cancer Son          last june     Allergies  Doxycycline     Immunization History   Administered Date(s) Administered    Flu vaccine, quadrivalent, high-dose, preservative free, age 65y+ (FLUZONE) 11/16/2020    Flu vaccine, trivalent, preservative free, HIGH-DOSE, age 65y+ (Fluzone) 11/03/2014, 11/04/2015    HiB, unspecified 12/01/2014    Meningococcal ACWY-D (Menactra) 4-valent conjugate vaccine 12/01/2014    Moderna SARS-CoV-2  Vaccination 04/27/2021, 05/27/2021    Pneumococcal conjugate vaccine, 13-valent (PREVNAR 13) 08/16/2018    Pneumococcal polysaccharide vaccine, 23-valent, age 2 years and older (PNEUMOVAX 23) 05/05/2014, 11/03/2014, 02/18/2020   Pneumonia vaccine is up to date, influenza vaccine is planned  Berry fall risk 85, preventive protocol was implemented  Depression screen is negative    Medications  Home medications:  Medications Prior to Admission   Medication Sig Dispense Refill Last Dose/Taking    acetaminophen (TylenoL) 325 mg capsule Take by mouth if needed.   12/10/2024 Morning    amLODIPine (Norvasc) 10 mg tablet Take 1 tablet (10 mg) by mouth once daily. 90 tablet 3 12/10/2024 Morning    aspirin 81 mg EC tablet Take 1 tablet (81 mg) by mouth once daily.   12/10/2024 Morning    atenolol (Tenormin) 50 mg tablet Take 1 tablet (50 mg) by mouth once daily. 90 tablet 1 12/10/2024 Morning    fish oil concentrate (Omega-3) 120-180 mg capsule Take 1 capsule (1 g) by mouth once daily in the morning.   12/10/2024 Morning    losartan (Cozaar) 25 mg tablet Take 1 tablet (25 mg) by mouth once daily. 90 tablet 3 12/10/2024 Morning    metFORMIN (Glucophage) 500 mg tablet Take 2 tablets (1,000 mg) by mouth once daily with breakfast. 180 tablet 1 12/10/2024 Morning     Current medications:  Scheduled medications  amLODIPine, 10 mg, oral, Daily  atenolol, 50 mg, oral, Daily  atorvastatin, 80 mg, oral, Nightly  clopidogrel, 75 mg, oral, Daily  [Held by provider] enoxaparin, 40 mg, subcutaneous, q24h  insulin lispro, 0-10 Units, subcutaneous, TID AC  losartan, 25 mg, oral, Daily  perflutren lipid microspheres, 0.5-10 mL of dilution, intravenous, Once in imaging  perflutren protein A microsphere, 0.5 mL, intravenous, Once in imaging  piperacillin-tazobactam, 4.5 g, intravenous, q6h  sulfur hexafluoride microsphr, 2 mL, intravenous, Once in imaging      Continuous medications     PRN medications  PRN medications: acetaminophen **OR**  acetaminophen **OR** acetaminophen, dextrose, dextrose, glucagon, glucagon, [] hydrALAZINE **FOLLOWED BY** hydrALAZINE, oxygen    Review of Systems   All other systems reviewed and are negative.       Objective  Range of Vitals (last 24 hours)  Heart Rate:  [65-77]   Temp:  [36.5 °C (97.7 °F)-37 °C (98.6 °F)]   Resp:  [16-18]   BP: (152-196)/(70-78)   SpO2:  [94 %-97 %]   Daily Weight  24 : 71.6 kg (157 lb 13.6 oz)    Body mass index is 28.87 kg/m².   Nutritional consult    Physical Exam  Constitutional:       Appearance: Normal appearance.   HENT:      Head: Normocephalic and atraumatic.      Mouth/Throat:      Mouth: Mucous membranes are moist.      Pharynx: Oropharynx is clear.   Eyes:      Pupils: Pupils are equal, round, and reactive to light.   Cardiovascular:      Rate and Rhythm: Normal rate and regular rhythm.      Heart sounds: Normal heart sounds.   Pulmonary:      Effort: Pulmonary effort is normal.      Breath sounds: Normal breath sounds.   Abdominal:      General: Abdomen is flat. Bowel sounds are normal.      Palpations: Abdomen is soft.   Musculoskeletal:      Cervical back: Normal range of motion.   Neurological:      Mental Status: She is alert.      Comments: Rt sided weakness          Relevant Results  Outside Hospital Results  reviewed  Labs  Results from last 72 hours   Lab Units 12/15/24  0602 24  0654 24  0659   WBC AUTO x10*3/uL 16.2* 14.4* 12.5*   HEMOGLOBIN g/dL 11.2* 11.0* 11.3*   HEMATOCRIT % 34.8* 35.1* 34.5*   PLATELETS AUTO x10*3/uL 393 364 374     Results from last 72 hours   Lab Units 12/15/24  0602 24  0654 24  0659   SODIUM mmol/L 139 142 138   POTASSIUM mmol/L 3.8 4.0 3.8   CHLORIDE mmol/L 106 104 105   CO2 mmol/L 25 27 23   BUN mg/dL 22 27* 44*   CREATININE mg/dL 0.59 0.75 0.87   GLUCOSE mg/dL 214* 240* 215*   CALCIUM mg/dL 8.3* 8.0* 8.2*   ANION GAP mmol/L 12 15 14   EGFR mL/min/1.73m*2 87 77 64   PHOSPHORUS mg/dL 2.5 2.8 3.3     Results  "from last 72 hours   Lab Units 12/15/24  0602 12/14/24  0654 12/13/24  0659   ALK PHOS U/L 66  --   --    BILIRUBIN TOTAL mg/dL 0.5  --   --    PROTEIN TOTAL g/dL 5.9*  --   --    ALT U/L 17  --   --    AST U/L 22  --   --    ALBUMIN g/dL 2.9* 2.7* 2.9*     Estimated Creatinine Clearance: 61.1 mL/min (by C-G formula based on SCr of 0.59 mg/dL).  No results found for: \"CRP\", \"SEDRATE\"  No results found for: \"HIV1X2\", \"HIVCONF\", \"FQCSRC2MV\"  No results found for: \"HEPCABINIT\", \"HEPCAB\", \"HCVPCRQUANT\"  Microbiology  Susceptibility data from last 90 days.  Collected Specimen Info Organism Ampicillin Cefazolin Cefazolin (uncomplicated UTIs only) Ciprofloxacin Gentamicin Nitrofurantoin Piperacillin/Tazobactam Trimethoprim/Sulfamethoxazole   12/11/24 Urine from Straight Catheter Escherichia coli  S  S  S  S  S  S  S  S   Imaging  Reviewed      Assessment/Plan   Possible pneumonia, suspect aspiration  UTI  Leukocytosis  Right hip pressure injury  Acquired absence of the spleen  Encephalopathy / CVA    Recommendations :  Can be deescalated to Unasyn  Keep the head of the bed elevated  Chest PT  Follow the WBC  Avoid pressure on the Rt hip  Wound care     I spent minutes in the professional and overall care of this patient.      Chele Watkins MD  "

## 2024-12-15 NOTE — CARE PLAN
The patient's goals for the shift include to go home    The clinical goals for the shift include Pt will have no negative change in neuro checks this shift.      Problem: General Stroke  Goal: Demonstrate improvement in neurological exam throughout the shift  Outcome: Progressing  Goal: Maintain BP within ordered limits throughout shift  Outcome: Progressing  Goal: Controlled blood glucose throughout shift  Outcome: Progressing     Problem: Skin  Goal: Decreased wound size/increased tissue granulation at next dressing change  Flowsheets (Taken 12/15/2024 0613)  Decreased wound size/increased tissue granulation at next dressing change: Promote sleep for wound healing  Goal: Participates in plan/prevention/treatment measures  Outcome: Progressing  Flowsheets (Taken 12/15/2024 0613)  Participates in plan/prevention/treatment measures: Elevate heels

## 2024-12-15 NOTE — CARE PLAN
Chest x-ray today with possibility of pneumonia or confluence of shadows in the medial right apex and also minimal left basilar infiltrate.  Due to patient having worsening leukocytosis, we will change antibiotics from Cipro to Zosyn.  Check procalcitonin level.  Consult ID.  Incentive spirometer.  Monitor.    Dorian Barry D.O.

## 2024-12-15 NOTE — PROGRESS NOTES
Occupational Therapy    OT Treatment    Patient Name: Soni Herrera  MRN: [id number]  Department: [address]  Room: [address] [place], [place] [address]A  Today's Date: 12/15/2024  Time Calculation  Start Time: 1022  Stop Time: 1049  Time Calculation (min): 27 min        Assessment:  OT Assessment: Pt presents with acute neurological deficits resulting in impaired mobility and decreased ADL performance. Will benefit from skilled OT services to increase functional outcomes  Prognosis: Good  Barriers to Discharge Home: Caregiver assistance, Physical needs  Caregiver Assistance: Patient lives alone and/or does not have reliable caregiver assistance  Physical Needs: Stair navigation to access bed limited by function/safety, Ambulating household distances limited by function/safety, 24hr mobility assistance needed, 24hr ADL assistance needed, High falls risk due to function or environment  Evaluation/Treatment Tolerance: Patient tolerated treatment well  Medical Staff Made Aware: Yes  End of Session Communication: Bedside nurse  End of Session Patient Position: Bed, 3 rail up, Alarm on  OT Assessment Results: Decreased ADL status, Decreased upper extremity range of motion, Decreased upper extremity strength, Decreased safe judgment during ADL, Decreased endurance, Decreased functional mobility  Prognosis: Good  Barriers to Discharge: Inaccessible home environment, Decreased caregiver support  Evaluation/Treatment Tolerance: Patient tolerated treatment well  Medical Staff Made Aware: Yes  Strengths: Ability to acquire knowledge  Barriers to Participation: Comorbidities  Plan:  Treatment Interventions: ADL retraining, Functional transfer training, UE strengthening/ROM, Endurance training, Neuromuscular reeducation, Compensatory technique education  OT Frequency: 3 times per week  OT Discharge Recommendations: Moderate intensity level of continued care  Equipment Recommended upon Discharge:  (TBD)  OT - OK to Discharge: Yes  Treatment Interventions: ADL  "retraining, Functional transfer training, UE strengthening/ROM, Endurance training, Neuromuscular reeducation, Compensatory technique education    Subjective   Previous Visit Info:  OT Last Visit  OT Received On: 12/15/24  General:  General  Reason for Referral: 89 yo female admit with acute ischemic left ANASTACIO stroke; referred to PT for impaired functional mobility  Referred By: Dorian Barry  Past Medical History Relevant to Rehab: history of diabetes, hypertension, hyperlipidemia, stage IIIa CKD, 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  Precautions:  Medical Precautions: Fall precautions    Vital Signs (Past 2hrs)        Date/Time Vitals Session Patient Position Pulse Resp SpO2 BP MAP (mmHg)    12/15/24 1200 --  --  71  17  94 %  162/72  --                      Objective    Cognition:  Cognition  Overall Cognitive Status: Within Functional Limits  Orientation Level: Oriented X4    Bed Mobility/Transfers: Bed Mobility  Bed Mobility: Yes  Bed Mobility 1  Bed Mobility 1: Supine to sitting, Sitting to supine  Level of Assistance 1: Moderate assistance, Maximum assistance  Bed Mobility Comments 1: Elevated HOB and mod-max assist to manage LE       Sitting Balance:  Static Sitting Balance  Static Sitting-Balance Support: Bilateral upper extremity supported, Feet supported  Static Sitting-Level of Assistance: Moderate assistance  Static Sitting-Comment/Number of Minutes: While seated at EOB pt demonstrated a R lateral and slight forward lean. Pt uable to self-correct on this date. Pt verbalized R arm \"just not feeling right\". Attempte to shift weight between arms however pt unable to indepepndently maintain upright sitting without mod-max " support.    Outcome Measures:[facility] Daily Activity  Putting on and taking off regular lower body clothing: Total  Bathing (including washing, rinsing, drying): A lot  Putting on and taking off regular upper body clothing: A lot  Toileting, which includes using toilet, bedpan or urinal: Total  Taking care of personal grooming such as brushing teeth: A little  Eating Meals: A little  Daily Activity - Total Score: 12    Education Documentation  Body Mechanics, taught by LATA Tinajero at 12/15/2024 12:20 PM.  Learner: Patient  Readiness: Acceptance  Method: Explanation, Demonstration  Response: Needs Reinforcement    Education Comments  No comments found.      IP EDUCATION:  Education  Individual(s) Educated: Patient  Risk and Benefits Discussed with Patient/Caregiver/Other: yes  Patient/Caregiver Demonstrated Understanding: yes  Plan of Care Discussed and Agreed Upon: yes  Patient Response to Education: Patient/Caregiver Verbalized Understanding of Information  Education Comment: Pt tolerated education however demonstrated need for continued reinforcement.    Goals:  Encounter Problems       Encounter Problems (Active)       OT Goals       Pt will complete bed mobility activities with Kellen (Progressing)       Start:  12/13/24    Expected End:  12/27/24            Pt will demo functional transfers to/ from EOB, chair and commode with ModA and LRD (Progressing)       Start:  12/13/24    Expected End:  12/27/24            Pt will demo ADL routine and meaningful daily activities with Kellen using modifications as needed  (Progressing)       Start:  12/13/24    Expected End:  12/27/24            Pt will demo improved static/ dynamic sitting balance, evidenced by completion of ADL or functional activity with < CG assist for duration of activity.   (Progressing)       Start:  12/13/24    Expected End:  12/27/24            Pt will complete A/AAROM BUE exercises, 10-15 reps 1-2 sets in all functional planes, to demo  improved functional strength and NM re-edu for increased participation in ADL and mobility  (Progressing)       Start:  12/13/24    Expected End:  12/27/24

## 2024-12-15 NOTE — PROGRESS NOTES
"  Subjective    Patient reports no improvement in strength of her right leg.  She states right arm is doing okay but is not at her baseline strength.  She denies nausea, vomiting, diarrhea, chest pain or shortness of breath.    Objective    Vitals  Visit Vitals  /72 (BP Location: Left arm, Patient Position: Lying)   Pulse 71   Temp 36.5 °C (97.7 °F) (Temporal)   Resp 17   Ht 1.575 m (5' 2\")   Wt 71.6 kg (157 lb 13.6 oz)   SpO2 94%   BMI 28.87 kg/m²   Smoking Status Never   BSA 1.77 m²       Physical Exam   General: Patient is alert. No acute distress.       HEENT: Clear sclera.  CVS: RRR.  Lungs: CTAB.   Abdomen: Soft.  Nontender.  Bowel sounds present.     Extremities: No pitting edema bilat ankles. Left, second toe with ecchymosis.    Neurologic: NIH stroke score is 3; (3 points for right leg weakness).   Psychiatric: Cooperative.     IOs    Intake/Output Summary (Last 24 hours) at 12/15/2024 1313  Last data filed at 12/15/2024 1125  Gross per 24 hour   Intake 576 ml   Output 650 ml   Net -74 ml       Labs:   Results from last 72 hours   Lab Units 12/15/24  0602 12/14/24  0654 12/13/24  0659   SODIUM mmol/L 139 142 138   POTASSIUM mmol/L 3.8 4.0 3.8   CHLORIDE mmol/L 106 104 105   CO2 mmol/L 25 27 23   BUN mg/dL 22 27* 44*   CREATININE mg/dL 0.59 0.75 0.87   GLUCOSE mg/dL 214* 240* 215*   CALCIUM mg/dL 8.3* 8.0* 8.2*   ANION GAP mmol/L 12 15 14   EGFR mL/min/1.73m*2 87 77 64   PHOSPHORUS mg/dL 2.5 2.8 3.3      Results from last 72 hours   Lab Units 12/15/24  0602 12/14/24  0654 12/13/24  0659   WBC AUTO x10*3/uL 16.2* 14.4* 12.5*   HEMOGLOBIN g/dL 11.2* 11.0* 11.3*   HEMATOCRIT % 34.8* 35.1* 34.5*   PLATELETS AUTO x10*3/uL 393 364 374      Lab Results   Component Value Date    CALCIUM 8.3 (L) 12/15/2024    PHOS 2.5 12/15/2024      No results found for: \"CRP\"   [unfilled]       Images  Transthoracic Echo (TTE) [facility], [address] [place] [address]                " Tel [phone] and Fax [phone]    TRANSTHORACIC ECHOCARDIOGRAM REPORT       Patient Name:       DOREEN BARTON        Reading Physician:    [id number] Emery Tijerina MD  Study Date:         12/12/2024          Ordering Provider:    [id number] KAL REYNOSO                                                                JAJA  MRN/PID:            [id number]            Fellow:  Accession#:         [id number]        Nurse:                Elba Huffman RN  Date of Birth/Age:  1935 / 89 years Sonographer:          Jossie Soe RDCS  Gender assigned at  F                   Additional Staff:  Birth:  Height:             157.48 cm           Admit Date:           12/11/2024  Weight:             71.21 kg            Admission Status:     Inpatient -                                                                Routine  BSA / BMI:          1.72 m2 / 28.72     Encounter#:           [id number]                      kg/m2  Blood Pressure:     124/55 mmHg         Department Location:  [facility] Non                                                                Invasive    Study Type:    TRANSTHORACIC ECHO (TTE) COMPLETE  Diagnosis/ICD: Other transient cerebral ischemic attacks and related                 syndromes-G45.8  Indication:    Stroke  CPT Code:      Echo Complete w Full Doppler-[id number]    Patient History:  Diabetes:          Yes  Pertinent History: HTN, Hyperlipidemia and Cancer. CKD.    Study Detail: The following Echo studies were performed: 2D, M-Mode, Doppler and                color flow. Technically challenging study due to patient lying in                supine position. Agitated saline used as a contrast agent for                intraseptal flow evaluation. The patient was asleep.       PHYSICIAN INTERPRETATION:  Left Ventricle: The left ventricular systolic function is normal, with a visually  estimated ejection fraction of 60-65%. There are no regional left ventricular wall motion abnormalities. The left ventricular cavity size is normal. There is mildly increased septal and mildly increased posterior left ventricular wall thickness. There is left ventricular concentric remodeling. Spectral Doppler shows a Grade I (impaired relaxation pattern) of left ventricular diastolic filling with normal left atrial filling pressure.  Left Atrium: The left atrium is normal in size. There is no evidence of a patent foramen ovale.  Right Ventricle: The right ventricle is normal in size. There is normal right ventricular global systolic function.  Right Atrium: The right atrium is normal in size.  Aortic Valve: The aortic valve is trileaflet. There is evidence of mild aortic valve stenosis. The aortic valve dimensionless index is 0.64. There is mild aortic valve regurgitation. The peak instantaneous gradient of the aortic valve is 24 mmHg. The mean gradient of the aortic valve is 13 mmHg.  Mitral Valve: The mitral valve is normal in structure. There is mild mitral valve regurgitation.  Tricuspid Valve: The tricuspid valve is structurally normal. There is mild tricuspid regurgitation.  Pulmonic Valve: The pulmonic valve is structurally normal. There is mild pulmonic valve regurgitation.  Pericardium: There is no pericardial effusion noted.  Aorta: The aortic root is normal.  Pulmonary Artery: The tricuspid regurgitant velocity is 2.94 m/s, and with an estimated right atrial pressure of 10 mmHg, the estimated pulmonary artery pressure is mildly elevated with the RVSP at 44.6 mmHg.  Systemic Veins: The inferior vena cava appears normal in size.       CONCLUSIONS:   1. The left ventricular systolic function is normal, with a visually estimated ejection fraction of 60-65%.   2. Spectral Doppler shows a Grade I (impaired relaxation pattern) of left ventricular diastolic filling with normal left atrial filling pressure.   3.  There is normal right ventricular global systolic function.   4. Mild aortic valve stenosis.   5. Mild aortic valve regurgitation.   6. There is no evidence of a patent foramen ovale.   7. The estimated pulmonary artery pressure is mildly elevated with the RVSP at 44.6 mmHg.    QUANTITATIVE DATA SUMMARY:     2D MEASUREMENTS:          Normal Ranges:  LAs:             1.37 cm  (2.7-4.0cm)  IVSd:            1.00 cm  (0.6-1.1cm)  LVPWd:           1.11 cm  (0.6-1.1cm)  LVIDd:           3.62 cm  (3.9-5.9cm)  LVIDs:           2.36 cm  LV Mass Index:   68 g/m2  LVEDV Index:     24 ml/m2  LV % FS          34.7 %       LA VOLUME:                    Normal Ranges:  LA Vol A4C:        45.0 ml    (22+/-6mL/m2)  LA Vol A2C:        46.9 ml  LA Vol BP:         45.9 ml  LA Vol Index A4C:  26.1ml/m2  LA Vol Index A2C:  27.2 ml/m2  LA Vol Index BP:   26.6 ml/m2  LA Area A4C:       18.4 cm2  LA Area A2C:       18.8 cm2  LA Major Axis A4C: 6.4 cm  LA Major Axis A2C: 6.4 cm  LA Volume Index:   26.4 ml/m2  LA Vol A4C:        42.1 ml  LA Vol A2C:        45.3 ml  LA Vol Index BSA:  25.3 ml/m2       RA VOLUME BY A/L METHOD:          Normal Ranges:  RA Area A4C:             12.7 cm2       M-MODE MEASUREMENTS:         Normal Ranges:  Ao Root:             3.00 cm (2.0-3.7cm)  LAs:                 2.97 cm (2.7-4.0cm)       AORTA MEASUREMENTS:         Normal Ranges:  Ao Sinus, d:        2.90 cm (2.1-3.5cm)  Asc Ao, d:          3.20 cm (2.1-3.4cm)       LV SYSTOLIC FUNCTION BY 2D PLANIMETRY (MOD):                       Normal Ranges:  EF-A4C View:    68 % (>=55%)  EF-A2C View:    68 %  EF-Biplane:     69 %  EF-Visual:      63 %  LV EF Reported: 63 %       LV DIASTOLIC FUNCTION:             Normal Ranges:  MV Peak E:             0.63 m/s    (0.7-1.2 m/s)  MV Peak A:             0.89 m/s    (0.42-0.7 m/s)  E/A Ratio:             0.70        (1.0-2.2)  MV e'                  0.050 m/s   (>8.0)  MV lateral e'          0.06 m/s  MV medial e'            0.04 m/s  MV A Dur:              159.17 msec  E/e' Ratio:            12.57       (<8.0)  PulmV Sys Payam:         63.10 cm/s  PulmV Salazar Payam:        27.84 cm/s  PulmV S/D Payam:         2.27  PulmV A Revs Payam:      25.22 cm/s  PulmV A Revs Dur:      141.87 msec       MITRAL VALVE:          Normal Ranges:  MV DT:        286 msec (150-240msec)       AORTIC VALVE:                      Normal Ranges:  AoV Vmax:                2.47 m/s  (<=1.7m/s)  AoV Peak P.3 mmHg (<20mmHg)  AoV Mean P.8 mmHg (1.7-11.5mmHg)  LVOT Max Payam:            1.37 m/s  (<=1.1m/s)  AoV VTI:                 49.86 cm  (18-25cm)  LVOT VTI:                31.76 cm  LVOT Diameter:           1.96 cm   (1.8-2.4cm)  AoV Area, VTI:           1.92 cm2  (2.5-5.5cm2)  AoV Area,Vmax:           1.67 cm2  (2.5-4.5cm2)  AoV Dimensionless Index: 0.64       AORTIC INSUFFICIENCY:  AI Vmax:       2.98 m/s  AI Half-time:  512 msec  AI Decel Time: 1767 msec  AI Decel Rate: 168.58 cm/s2       RIGHT VENTRICLE:  RV Basal 2.90 cm  RV Mid   2.00 cm  RV Major 6.7 cm  TAPSE:   22.0 mm  RV s'    0.12 m/s       TRICUSPID VALVE/RVSP:          Normal Ranges:  Peak TR Velocity:     2.94 m/s  RV Syst Pressure:     45 mmHg  (< 30mmHg)  IVC Diam:             1.28 cm       PULMONIC VALVE:          Normal Ranges:  PV Max Payam:     0.9 m/s  (0.6-0.9m/s)  PV Max PG:      3.1 mmHg       Pulmonary Veins:  PulmV A Revs Dur: 141.87 msec  PulmV A Revs Payam: 25.22 cm/s  PulmV Salazar Payam:   27.84 cm/s  PulmV S/D Payam:    2.27  PulmV Sys Payam:    63.10 cm/s       AORTA:  Asc Ao Diam 3.21 cm       [id number] Emery Tijerina MD  Electronically signed on 2024 at 4:03:44 PM       ** Final **  CT brain attack head wo IV contrast  Narrative: Interpreted By:  Orville Esquivel,   STUDY:  CT BRAIN ATTACK HEAD WO IV CONTRAST;  2024 10:57 am      INDICATION:  Signs/Symptoms:acute stroke.      COMPARISON:  Comparison studies from 2024.. Correlation with MRI  from  12/11/2024.      ACCESSION NUMBER(S):  [id number]      ORDERING CLINICIAN:  KAL WILKINSON      TECHNIQUE:  Routine axial images were obtained from the skull base through the  vertex.  Sagittal and coronal reconstruction images were generated.  Brain, subdural, and bone windows were reviewed. N/A   N/A      FINDINGS:  INTRACRANIAL:  Mild-to-moderate prominence of ventricles and sulci. There is  mild-to-moderate patchy hypodensity throughout the deep  periventricular white matter. Small old lacunar infarct in the right  cerebellum. There is a localized grossly stable area of hypodensity  in the mid left parasagittal parietal white matter, consistent with  acute or subacute nonhemorrhagic infarct. There is no acute blood  density in the brain in this exam and there is no acute extra-axial  hematoma either in the current study. No midline shift. No  destructive bone lesion. No depressed skull fracture. Skullbase  arterial calcifications in the carotid siphons.      EXTRACRANIAL:  Visualized paranasal sinuses were clear.  Visualized mastoid air cells were clear.      Impression: Findings consistent with stable small acute to subacute  nonhemorrhagic infarct in the parasagittal mid left parietal white  matter just above the body of the left lateral ventricle.      No acute intracranial bleed based on today's exam. No midline shift.      Mild-to-moderate volume loss.      Mild-to-moderate chronic white matter ischemic disease in the deep  periventricular regions.      MACRO:  Orville Esquivel discussed the significance and urgency of this critical  finding by epic secure chat with  KAL WILKINSON on 12/12/2024 at 11:11  am.  (**-RCF-**) Findings:  See findings.      Signed by: Orville Esquivel 12/12/2024 11:11 AM  Dictation workstation:   [id number]  ECG 12 lead  Normal sinus rhythm  Nonspecific ST and T wave abnormality  QTcB >= 480 msec  Abnormal ECG  When compared with ECG of 27-AUG-2014 13:17,  No significant change was  found      Meds  Scheduled medications  amLODIPine, 10 mg, oral, Daily  atenolol, 50 mg, oral, Daily  atorvastatin, 80 mg, oral, Nightly  ciprofloxacin, 250 mg, oral, q12h JUANCARLOS  clopidogrel, 75 mg, oral, Daily  [Held by provider] enoxaparin, 40 mg, subcutaneous, q24h  insulin lispro, 0-10 Units, subcutaneous, TID AC  losartan, 25 mg, oral, Daily  perflutren lipid microspheres, 0.5-10 mL of dilution, intravenous, Once in imaging  perflutren protein A microsphere, 0.5 mL, intravenous, Once in imaging  sulfur hexafluoride microsphr, 2 mL, intravenous, Once in imaging      Continuous medications     PRN medications  PRN medications: acetaminophen **OR** acetaminophen **OR** acetaminophen, dextrose, dextrose, glucagon, glucagon, [] hydrALAZINE **FOLLOWED BY** hydrALAZINE, oxygen     Assessment and Plan    Soni Herrera is a 89 y.o. 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      Acute stroke  -CT of the head with acute left anterior cerebral artery infarct with no evidence for hemorrhage.  -ER provider spoke with Dr. Bill, neurology at [facility], who did not recommend any acute interventions.  -MRI of the brain with subacute ischemic infarct in the left anterior cerebral artery territory along the left parasagittal frontal lobe associated with mild petechial cortical hemorrhage.   -MRA of the brain and neck were also performed.  -Patient had an episode of increased weakness of the right upper extremity on .  Repeat CT scan was performed which revealed stable small acute to subacute nonhemorrhagic infarct in the parasagittal mid left parietal white matter just above the body of the left lateral ventricle. Dr. Ruiz, neurologist, was also made aware of the CT scan findings and I  discussed the case with him as well at that time.  -Neurology recommended to switch from aspirin 81 mg daily, which patient reports she was taking prior to admission, to Plavix 75 mg daily.  Neurology recommends atorvastatin 80 mg nightly and Holter monitor upon discharge.  -Echo was performed on 12/12/2024 with no evidence of a patent foramen ovale.  -PT/OT are following.  -Restarted atenolol and losartan on 12/14 and will restart amlodipine today, 12/15, as meds had been on hold to allow for permissive hypertension.    -Monitor.     Leukocytosis  -WBC up to 16.2 today; 14.4 on 12/14; 12.5 on 12/13.  Not clear why WBCs uptrending.  Patient was on Rocephin for E. coli, which appears to be pansensitive although Rocephin not listed specifically, but sensitive to cefazolin.  Changed antibiotics to renally dosed Cipro on 12/14. Patient afebrile as well. Will check a chest x-ray although patient not complaining of respiratory symptoms.  -Monitor.      UTI  -Urine culture grew E. coli.  -As stated above, we changed Rocephin to renally dosed Cipro on 12/14.  -Monitor.     Mildly elevated CPK  -Likely secondary to fall and patient being on the ground.  -Resolved.      Elevated troponin  -Likely secondary to demand ischemia.  Patient denies chest pain but is not a good historian of the last few days.  -I reviewed patient's EKG which reveals normal sinus rhythm with no acute ST-T changes  -Echo was performed on 12/12/2024.  -Cardiology following and patient will need further cardiac ambulatory monitoring on discharge.     Right lower extremity skin changes  -Likely secondary to fall.  Patient on Rocephin for UTI as well.    -Monitor.     Hypomagnesemia  -Resolved.  Monitor.       Lumbar spine deformity  -L4 chronic deformity found on CT scan.  Will monitor and follow-up with PCP as an outpatient.     Diabetes  -Holding home medication of metformin.  -HbA1c was 7.7 on 12/11/2024.  -Continue SSI and monitor.      Hypertension  -We had been holding home meds of amlodipine, losartan and atenolol to allow for permissive hypertension in the setting of acute CVA.  -Restart losartan, atenolol on 12/14 and we will resume amlodipine today, 12/15.    -Monitor.     History of diffuse large B-cell lymphoma  -Recommend follow-up with her regular oncologist as an outpatient and was seen by Dr. Means on 2/16/24.      Hyperlipidemia  -Placed patient on atorvastatin 80 mg daily due to acute CVA.     Stage IIIa CKD  -Renal function is around baseline.  Monitor.     DVT prophylaxis  -SCDs and holding Lovenox subcu in the setting of petechial hemorrhages on the brain on MRI.

## 2024-12-15 NOTE — CARE PLAN
The patient's goals for the shift include remain comfortable    The clinical goals for the shift include Pt will have no negative change in neuro checks this shift.    .  Problem: General Stroke  Goal: Establish a mutual long term goal with patient by discharge  Outcome: Progressing     Problem: General Stroke  Goal: Demonstrate improvement in neurological exam throughout the shift  Outcome: Progressing     Problem: General Stroke  Goal: Maintain BP within ordered limits throughout shift  Outcome: Progressing     Problem: General Stroke  Goal: No symptoms of aspiration throughout shift  Outcome: Progressing     Problem: General Stroke  Goal: Controlled blood glucose throughout shift  Outcome: Progressing

## 2024-12-16 LAB
ALBUMIN SERPL BCP-MCNC: 2.7 G/DL (ref 3.4–5)
ALBUMIN SERPL BCP-MCNC: 2.8 G/DL (ref 3.4–5)
ANION GAP SERPL CALC-SCNC: 14 MMOL/L (ref 10–20)
ANION GAP SERPL CALC-SCNC: 15 MMOL/L (ref 10–20)
BUN SERPL-MCNC: 18 MG/DL (ref 6–23)
BUN SERPL-MCNC: 19 MG/DL (ref 6–23)
CALCIUM SERPL-MCNC: 7.9 MG/DL (ref 8.6–10.3)
CALCIUM SERPL-MCNC: 8.1 MG/DL (ref 8.6–10.3)
CHLORIDE SERPL-SCNC: 102 MMOL/L (ref 98–107)
CHLORIDE SERPL-SCNC: 103 MMOL/L (ref 98–107)
CO2 SERPL-SCNC: 24 MMOL/L (ref 21–32)
CO2 SERPL-SCNC: 25 MMOL/L (ref 21–32)
CREAT SERPL-MCNC: 0.67 MG/DL (ref 0.5–1.05)
CREAT SERPL-MCNC: 0.78 MG/DL (ref 0.5–1.05)
EGFRCR SERPLBLD CKD-EPI 2021: 73 ML/MIN/1.73M*2
EGFRCR SERPLBLD CKD-EPI 2021: 84 ML/MIN/1.73M*2
ERYTHROCYTE [DISTWIDTH] IN BLOOD BY AUTOMATED COUNT: 13.4 % (ref 11.5–14.5)
GLUCOSE BLD MANUAL STRIP-MCNC: 173 MG/DL (ref 74–99)
GLUCOSE BLD MANUAL STRIP-MCNC: 211 MG/DL (ref 74–99)
GLUCOSE BLD MANUAL STRIP-MCNC: 223 MG/DL (ref 74–99)
GLUCOSE BLD MANUAL STRIP-MCNC: 246 MG/DL (ref 74–99)
GLUCOSE SERPL-MCNC: 210 MG/DL (ref 74–99)
GLUCOSE SERPL-MCNC: 248 MG/DL (ref 74–99)
HCT VFR BLD AUTO: 33.4 % (ref 36–46)
HGB BLD-MCNC: 11 G/DL (ref 12–16)
MAGNESIUM SERPL-MCNC: 1.7 MG/DL (ref 1.6–2.4)
MCH RBC QN AUTO: 32.4 PG (ref 26–34)
MCHC RBC AUTO-ENTMCNC: 32.9 G/DL (ref 32–36)
MCV RBC AUTO: 99 FL (ref 80–100)
NRBC BLD-RTO: 0 /100 WBCS (ref 0–0)
PHOSPHATE SERPL-MCNC: 3.4 MG/DL (ref 2.5–4.9)
PHOSPHATE SERPL-MCNC: 3.9 MG/DL (ref 2.5–4.9)
PLATELET # BLD AUTO: 370 X10*3/UL (ref 150–450)
POTASSIUM SERPL-SCNC: 3.6 MMOL/L (ref 3.5–5.3)
POTASSIUM SERPL-SCNC: 3.8 MMOL/L (ref 3.5–5.3)
PROCALCITONIN SERPL-MCNC: 0.05 NG/ML
RBC # BLD AUTO: 3.39 X10*6/UL (ref 4–5.2)
SODIUM SERPL-SCNC: 137 MMOL/L (ref 136–145)
SODIUM SERPL-SCNC: 138 MMOL/L (ref 136–145)
WBC # BLD AUTO: 15.9 X10*3/UL (ref 4.4–11.3)

## 2024-12-16 PROCEDURE — 2500000001 HC RX 250 WO HCPCS SELF ADMINISTERED DRUGS (ALT 637 FOR MEDICARE OP): Performed by: INTERNAL MEDICINE

## 2024-12-16 PROCEDURE — 97535 SELF CARE MNGMENT TRAINING: CPT | Mod: GO,CO

## 2024-12-16 PROCEDURE — 97530 THERAPEUTIC ACTIVITIES: CPT | Mod: GO,CO

## 2024-12-16 PROCEDURE — 82947 ASSAY GLUCOSE BLOOD QUANT: CPT

## 2024-12-16 PROCEDURE — 84132 ASSAY OF SERUM POTASSIUM: CPT | Performed by: INTERNAL MEDICINE

## 2024-12-16 PROCEDURE — 83735 ASSAY OF MAGNESIUM: CPT | Performed by: INTERNAL MEDICINE

## 2024-12-16 PROCEDURE — 99232 SBSQ HOSP IP/OBS MODERATE 35: CPT | Performed by: INTERNAL MEDICINE

## 2024-12-16 PROCEDURE — 97112 NEUROMUSCULAR REEDUCATION: CPT | Mod: GP

## 2024-12-16 PROCEDURE — 97530 THERAPEUTIC ACTIVITIES: CPT | Mod: GP

## 2024-12-16 PROCEDURE — 36415 COLL VENOUS BLD VENIPUNCTURE: CPT | Performed by: INTERNAL MEDICINE

## 2024-12-16 PROCEDURE — 2500000002 HC RX 250 W HCPCS SELF ADMINISTERED DRUGS (ALT 637 FOR MEDICARE OP, ALT 636 FOR OP/ED): Performed by: INTERNAL MEDICINE

## 2024-12-16 PROCEDURE — 94760 N-INVAS EAR/PLS OXIMETRY 1: CPT

## 2024-12-16 PROCEDURE — 87493 C DIFF AMPLIFIED PROBE: CPT | Mod: GEALAB | Performed by: INTERNAL MEDICINE

## 2024-12-16 PROCEDURE — 2500000004 HC RX 250 GENERAL PHARMACY W/ HCPCS (ALT 636 FOR OP/ED): Performed by: INTERNAL MEDICINE

## 2024-12-16 PROCEDURE — 1200000002 HC GENERAL ROOM WITH TELEMETRY DAILY

## 2024-12-16 PROCEDURE — 80069 RENAL FUNCTION PANEL: CPT | Performed by: INTERNAL MEDICINE

## 2024-12-16 PROCEDURE — 85027 COMPLETE CBC AUTOMATED: CPT | Performed by: INTERNAL MEDICINE

## 2024-12-16 RX ORDER — EAR PLUGS
1 EACH OTIC (EAR) AS NEEDED
Status: DISCONTINUED | OUTPATIENT
Start: 2024-12-16 | End: 2024-12-18 | Stop reason: HOSPADM

## 2024-12-16 RX ORDER — INSULIN LISPRO 100 [IU]/ML
0-10 INJECTION, SOLUTION INTRAVENOUS; SUBCUTANEOUS
Status: DISCONTINUED | OUTPATIENT
Start: 2024-12-16 | End: 2024-12-18 | Stop reason: HOSPADM

## 2024-12-16 ASSESSMENT — COGNITIVE AND FUNCTIONAL STATUS - GENERAL
DRESSING REGULAR UPPER BODY CLOTHING: A LITTLE
CLIMB 3 TO 5 STEPS WITH RAILING: TOTAL
CLIMB 3 TO 5 STEPS WITH RAILING: TOTAL
MOBILITY SCORE: 8
MOVING FROM LYING ON BACK TO SITTING ON SIDE OF FLAT BED WITH BEDRAILS: A LOT
EATING MEALS: A LITTLE
WALKING IN HOSPITAL ROOM: TOTAL
WALKING IN HOSPITAL ROOM: TOTAL
DRESSING REGULAR UPPER BODY CLOTHING: A LOT
MOVING FROM LYING ON BACK TO SITTING ON SIDE OF FLAT BED WITH BEDRAILS: A LOT
MOBILITY SCORE: 10
TURNING FROM BACK TO SIDE WHILE IN FLAT BAD: A LOT
TOILETING: TOTAL
STANDING UP FROM CHAIR USING ARMS: A LOT
HELP NEEDED FOR BATHING: A LITTLE
HELP NEEDED FOR BATHING: A LOT
EATING MEALS: A LITTLE
STANDING UP FROM CHAIR USING ARMS: TOTAL
PERSONAL GROOMING: A LITTLE
DAILY ACTIVITIY SCORE: 12
DAILY ACTIVITIY SCORE: 16
PERSONAL GROOMING: A LITTLE
MOVING TO AND FROM BED TO CHAIR: TOTAL
TOILETING: A LOT
TURNING FROM BACK TO SIDE WHILE IN FLAT BAD: A LOT
MOVING TO AND FROM BED TO CHAIR: A LOT
DRESSING REGULAR LOWER BODY CLOTHING: A LOT
DRESSING REGULAR LOWER BODY CLOTHING: TOTAL

## 2024-12-16 ASSESSMENT — PAIN SCALES - GENERAL
PAINLEVEL_OUTOF10: 0 - NO PAIN

## 2024-12-16 ASSESSMENT — ACTIVITIES OF DAILY LIVING (ADL): HOME_MANAGEMENT_TIME_ENTRY: 13

## 2024-12-16 ASSESSMENT — PAIN - FUNCTIONAL ASSESSMENT
PAIN_FUNCTIONAL_ASSESSMENT: 0-10

## 2024-12-16 NOTE — PROGRESS NOTES
12/16/24 1445   Discharge Planning   Living Arrangements Alone;Other (Comment)  ((May discharge to son and daalethea in laws home in [facility]))   Support Systems Children;Friends/neighbors   Assistance Needed Alert and oriented x 3, Independent with ADL's, Uses cane as needed, Doesn't drive anymore; Room air baseline and currently room air; PCP Jocelyn Bowens CNP   Type of Residence Private residence   Number of Stairs to Enter Residence 3   Number of Stairs Within Residence 14   Do you have animals or pets at home? No   Who is requesting discharge planning? Provider   Home or Post Acute Services Post acute facilities (Rehab/SNF/etc)   Type of Post Acute Facility Services Skilled nursing   Expected Discharge Disposition SNF  [facility] awaiting [facility] from insurance)   Does the patient need discharge transport arranged? Yes   RoundTrip coordination needed? Yes   Has discharge transport been arranged? No   Patient Choice   Provider Choice list and CMS website (https://medicare.gov/care-compare#search) for post-acute Quality and Resource Measure Data were provided and reviewed with: Patient;Family   Patient / Family choosing to utilize agency / facility established prior to hospitalization No

## 2024-12-16 NOTE — CARE PLAN
The patient's goals for the shift include to go home    The clinical goals for the shift include pt will have no neuro changes during shift      Problem: General Stroke  Goal: Establish a mutual long term goal with patient by discharge  Outcome: Progressing  Goal: Demonstrate improvement in neurological exam throughout the shift  Outcome: Progressing  Goal: Maintain BP within ordered limits throughout shift  Outcome: Progressing  Goal: Participate in treatment (ie., meds, therapy) throughout shift  Outcome: Progressing     Problem: Skin  Goal: Decreased wound size/increased tissue granulation at next dressing change  Outcome: Progressing  Flowsheets (Taken 12/16/2024 0512)  Decreased wound size/increased tissue granulation at next dressing change: Promote sleep for wound healing  Goal: Prevent/manage excess moisture  Outcome: Progressing  Flowsheets (Taken 12/16/2024 0512)  Prevent/manage excess moisture: Cleanse incontinence/protect with barrier cream

## 2024-12-16 NOTE — PROGRESS NOTES
Occupational Therapy    Occupational Therapy Treatment    Name: Soni Herrera  MRN: [id number]  Department: [address]  Room: [address] [place] [address]  Date: 12/16/24  Time Calculation  Start Time: 1339  Stop Time: 1405  Time Calculation (min): 26 min    Assessment:  End of Session Communication: Bedside nurse  End of Session Patient Position: Bed, 3 rail up, Alarm on  Plan:  Treatment Interventions: ADL retraining, Functional transfer training, UE strengthening/ROM, Neuromuscular reeducation, Compensatory technique education  OT Frequency: 3 times per week  OT Discharge Recommendations: Moderate intensity level of continued care  Equipment Recommended upon Discharge:  (TBD)  OT - OK to Discharge: Yes    Subjective   Previous Visit Info:  OT Last Visit  OT Received On: 12/16/24  General:  General  Reason for Referral: 88 yo female admit with acute ischemic left ANASTACIO stroke; referred to OT for impaired functional mobility  Referred By: Dorian Barry  Past Medical History Relevant to Rehab: history of diabetes, hypertension, hyperlipidemia, stage IIIa CKD, 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  Co-Treatment: PT  Co-Treatment Reason: To maximize pt outcomes  Prior to Session Communication: Bedside nurse  Patient Position Received: Bed, 3 rail up, Alarm on  General Comment:  (New CONTACT PRECAUTIONS for C-DIFF this date. Pt. pleasant, coopeartive, reports feeling very tired after multiple bowel movements this date.)  Precautions:  Medical Precautions: Fall precautions  Precautions Comment: sharita, tony, B SCD's, pillow positioned for heel pressure relief    Vital Signs (Past 2hrs)                Pain Assessment:  Pain Assessment  Pain Assessment: 0-10  0-10 (Numeric) Pain Score: 0 - No pain (no pain reported this date)     Objective   Cognition:  Orientation Level: Oriented X4  Activities of Daily Living:      Toileting  Toileting Comments:  (Pt incontinent in bm and required max a x2  for rear kev care)    Functional Standing Tolerance:     Bed Mobility/Transfers: Bed Mobility  Bed Mobility: Yes  Bed Mobility 1  Bed Mobility 1: Supine to sitting, Sitting to supine  Level of Assistance 1: Moderate assistance, Maximum assistance  Bed Mobility Comments 1:  (HOB flat, use of bedrail, verbal and tactile cues and instruction for technique)  Bed Mobility 2  Bed Mobility  2: Rolling left  Level of Assistance 2: Maximum assistance, Moderate verbal cues, Moderate tactile cues  Bed Mobility 3  Bed Mobility 3: Rolling right  Level of Assistance 3: Minimum assistance  Bed Mobility Comments 3:  (with use of bedrails and vc)  Bed Mobility 4  Bed Mobility 4: Sitting to supine  Level of Assistance 4: Moderate assistance (x2)    Transfers  Transfer: Yes  Transfer 1  Trials/Comments 1:  (Initated sit/stand transfer training with max/total assist pt. leaning forward to weight bear thru BLE and shift COG forward (nose over toes) with therapist in front of pt. and supporting RLE.)      Outcome Measures:  [facility] Daily Activity  Putting on and taking off regular lower body clothing: Total  Bathing (including washing, rinsing, drying): A lot  Putting on and taking off regular upper body clothing: A lot  Toileting, which includes using toilet, bedpan or urinal: Total  Taking care of personal grooming such as brushing teeth: A little  Eating Meals: A little  Daily Activity - Total Score: 12      Goals:  Encounter Problems       Encounter Problems (Active)       OT Goals       Pt will complete bed mobility activities with Kellen (Progressing)       Start:  12/13/24    Expected End:  12/27/24            Pt will demo functional transfers to/ from EOB, chair and commode with ModA and LRD (Progressing)       Start:  12/13/24    Expected End:  12/27/24            Pt will demo ADL routine and meaningful daily activities with Kellen using modifications as needed  (Progressing)       Start:  12/13/24    Expected End:  12/27/24             Pt will demo improved static/ dynamic sitting balance, evidenced by completion of ADL or functional activity with < CG assist for duration of activity.   (Progressing)       Start:  12/13/24    Expected End:  12/27/24            Pt will complete A/AAROM BUE exercises, 10-15 reps 1-2 sets in all functional planes, to demo improved functional strength and NM re-edu for increased participation in ADL and mobility  (Progressing)       Start:  12/13/24    Expected End:  12/27/24

## 2024-12-16 NOTE — PROGRESS NOTES
Physical Therapy    Physical Therapy Treatment    Patient Name: Soni Herrera  MRN: [id number]  Department: [address]  [facility]  Today's Date: 12/16/2024  Time Calculation  Start Time: 1339  Stop Time: 1405  Time Calculation (min): 26 min         Assessment/Plan   PT Assessment  PT Assessment Results: Decreased strength, Decreased endurance, Impaired balance, Decreased mobility  Rehab Prognosis: Fair  Barriers to Discharge Home: Physical needs  Physical Needs: 24hr mobility assistance needed, 24hr ADL assistance needed  Evaluation/Treatment Tolerance: Patient tolerated treatment well  Medical Staff Made Aware: Yes  Strengths: Ability to acquire knowledge  Barriers to Participation: Comorbidities  End of Session Communication: Bedside nurse  Assessment Comment: Patient with significant R sided weakness specifially at trunk and R LE resulting in decreased functional mobility and increased assist. Continue to recommend MOD intensity PT intervention.  End of Session Patient Position: Bed, 3 rail up, Alarm on     PT Plan  Treatment/Interventions: Bed mobility, Transfer training, Gait training, Balance training, Neuromuscular re-education, Strengthening  PT Plan: Ongoing PT  PT Frequency: 3 times per week  PT Discharge Recommendations: Moderate intensity level of continued care  Equipment Recommended upon Discharge:  (TBD)  PT Recommended Transfer Status: Assist x2  PT - OK to Discharge: Yes      General Visit Information:   PT  Visit  PT Received On: 12/16/24  Response to Previous Treatment: Patient with no complaints from previous session.  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  Pt. pleasant, coopeartive, reports feeling very tired after multiple bowel movements  this date.    Subjective   Precautions:  Precautions  Medical Precautions: Fall precautions, Infection precautions (CONTAACT PRECAUTIONS - C-DIFF)  Precautions Comment: tony sheriff B SCD's, pillow positioned for heel pressure relief; Discussed with RN recommendation for PRAFO boots to better position pt. B ankles in neutral (plantarflexors getting tight, especially on R)    Vital Signs (Past 2hrs)                 Objective   Pain:  Pain Assessment  Pain Assessment: 0-10  0-10 (Numeric) Pain Score: 0 - No pain  Cognition:  Cognition  Orientation Level: Oriented X4  Coordination:     Postural Control:  Postural Control  Postural Control: Impaired  Posture Comment: lateral lean toward R in sitting - especially when fatigued.  Able to lean toward L til elbow touched bed and regain midline with SBA x 3attempts  Static Sitting Balance  Static Sitting-Balance Support: Bilateral upper extremity supported, Feet supported  Static Sitting-Level of Assistance:  (SBA to Nithya)  Static Sitting-Comment/Number of Minutes: R sided lean  Extremity/Trunk Assessments:        RLE   RLE :  (tightness noted in R ankle plantarflexors)     Activity Tolerance:  Activity Tolerance  Endurance:  (Tolerated 20-25 min activity with multiple rest breaks)  Early Mobility/Exercise Safety Screen: Proceed with mobilization - No exclusion criteria met  Treatments:  Therapeutic Exercise  Therapeutic Exercise Performed: Yes  Therapeutic Exercise Activity 1: facilitated LLE AAROM hip flex/ext, knee flex/ext, ankle PF/DF    Therapeutic Activity  Therapeutic Activity Performed: Yes  Therapeutic Activity 1: Completed rolling,  supine/sit, initiated sit/stand training    Balance/Neuromuscular Re-Education  Balance/Neuromuscular Re-Education Activity Performed: Yes  Balance/Neuromuscular Re-Education Activity 1: Sitting balance training with focus on midline awareness, equal weight shift anterior/posterior and laterally, movement from lateral L leaning to midline x 5 min, SBA to Nithya with verbal and tactile cues    Bed Mobility  Bed Mobility: Yes  Bed Mobility 1  Bed Mobility 1: Supine to sitting  Level of Assistance 1: Moderate assistance  Bed Mobility Comments 1: HOB flat, use of bedrail, verbal and tactile cues and instruction for technique  Bed Mobility 2  Bed Mobility  2: Rolling left  Level of Assistance 2: Moderate assistance, Moderate verbal cues  Bed Mobility Comments 2: assist for RLE  Bed Mobility 3  Bed Mobility 3: Rolling right  Level of Assistance 3: Minimum assistance  Bed Mobility Comments 3: with use of bedrail and VC's  Bed Mobility 4  Bed Mobility 4: Sitting to supine  Level of Assistance 4: Moderate assistance (x2)    Ambulation/Gait Training  Ambulation/Gait Training Performed: No  Transfers  Transfer: Yes  Transfer 1  Trials/Comments 1: Iniitated sit/stand transfer training with max/total assist pt. leaning forward to weight bear thru BLE and shift COG forward (nose over toes) with therapist in front of pt. and supporting RLE.    Outcome Measures:  [facility] Basic Mobility  Turning from your back to your side while in a flat bed without using bedrails: A lot  Moving from lying on your back to sitting on the side of a flat bed without using bedrails: A lot  Moving to and from bed to chair (including a wheelchair): Total  Standing up from a chair using your arms (e.g. wheelchair or bedside chair): Total  To walk in hospital room: Total  Climbing 3-5 steps with railing: Total  Basic Mobility - Total Score: 8    Education Documentation  Mobility Training, taught by Apple Lang, PT at 12/16/2024  2:32 PM.  Learner:  Patient  Readiness: Acceptance  Method: Explanation  Response: Needs Reinforcement, Verbalizes Understanding    Education Comments  No comments found.        OP EDUCATION:       Encounter Problems       Encounter Problems (Active)       Balance       STG - Maintains static standing balance with upper extremity support x 3' min A  (Progressing)       Start:  12/13/24    Expected End:  12/27/24            STG - Maintains dynamic sitting balance with upper extremity support x 10' supervision  (Progressing)       Start:  12/13/24    Expected End:  12/27/24               Mobility       STG - Patient will ambulate with LRD 10' min A  (Progressing)       Start:  12/13/24    Expected End:  12/27/24            STG - Patient will ascend and descend 4 stairs B rails min A  (Progressing)       Start:  12/13/24    Expected End:  12/27/24               PT Transfers       STG - Patient will perform bed mobility min A   (Progressing)       Start:  12/13/24    Expected End:  12/27/24            STG - Patient will transfer sit to and from stand min A   (Progressing)       Start:  12/13/24    Expected End:  12/27/24                    Tibia fracture

## 2024-12-16 NOTE — CARE PLAN
Problem: General Stroke  Goal: Establish a mutual long term goal with patient by discharge  Outcome: Progressing  Goal: Demonstrate improvement in neurological exam throughout the shift  Outcome: Progressing   The patient's goals for the shift include to go home    The clinical goals for the shift include Pt will have no neuro changes during shift

## 2024-12-16 NOTE — PROGRESS NOTES
Soni Herrera is a 89 y.o. female on day 5 of admission presenting with Stroke with cerebral ischemia (Multi).    Subjective   Interval History: no fever, no new complaints        Review of Systems    Objective   Range of Vitals (last 24 hours)  Heart Rate:  [60-69]   Temp:  [36.4 °C (97.5 °F)-37.2 °C (99 °F)]   Resp:  [16-18]   BP: (131-196)/(63-74)   SpO2:  [91 %-93 %]   Daily Weight  12/11/24 : 71.6 kg (157 lb 13.6 oz)    Body mass index is 28.87 kg/m².    Physical Exam  Constitutional:       Appearance: Normal appearance.   HENT:      Head: Normocephalic and atraumatic.      Mouth/Throat:      Mouth: Mucous membranes are moist.      Pharynx: Oropharynx is clear.   Eyes:      Pupils: Pupils are equal, round, and reactive to light.   Cardiovascular:      Rate and Rhythm: Normal rate and regular rhythm.      Heart sounds: Normal heart sounds.   Pulmonary:      Effort: Pulmonary effort is normal.      Breath sounds: Normal breath sounds.   Abdominal:      General: Abdomen is flat. Bowel sounds are normal.      Palpations: Abdomen is soft.   Musculoskeletal:      Cervical back: Normal range of motion.   Neurological:      Mental Status: She is alert.         Antibiotics  ampicillin-sulbactam - 3 gram/100 mL    Relevant Results  Labs  Results from last 72 hours   Lab Units 12/16/24  0648 12/15/24  0602 12/14/24  0654   WBC AUTO x10*3/uL 15.9* 16.2* 14.4*   HEMOGLOBIN g/dL 11.0* 11.2* 11.0*   HEMATOCRIT % 33.4* 34.8* 35.1*   PLATELETS AUTO x10*3/uL 370 393 364     Results from last 72 hours   Lab Units 12/16/24  1435 12/16/24  0648 12/15/24  0602   SODIUM mmol/L 137 138 139   POTASSIUM mmol/L 3.6 3.8 3.8   CHLORIDE mmol/L 102 103 106   CO2 mmol/L 25 24 25   BUN mg/dL 19 18 22   CREATININE mg/dL 0.78 0.67 0.59   GLUCOSE mg/dL 248* 210* 214*   CALCIUM mg/dL 7.9* 8.1* 8.3*   ANION GAP mmol/L 14 15 12   EGFR mL/min/1.73m*2 73 84 87   PHOSPHORUS mg/dL 3.4 3.9 2.5     Results from last 72 hours   Lab Units 12/16/24  8236  "12/16/24  0648 12/15/24  0602   ALK PHOS U/L  --   --  66   BILIRUBIN TOTAL mg/dL  --   --  0.5   PROTEIN TOTAL g/dL  --   --  5.9*   ALT U/L  --   --  17   AST U/L  --   --  22   ALBUMIN g/dL 2.8* 2.7* 2.9*     Estimated Creatinine Clearance: 46.2 mL/min (by C-G formula based on SCr of 0.78 mg/dL).  No results found for: \"CRP\"  Microbiology  Susceptibility data from last 14 days.  Collected Specimen Info Organism Ampicillin Cefazolin Cefazolin (uncomplicated UTIs only) Ciprofloxacin Gentamicin Nitrofurantoin Piperacillin/Tazobactam Trimethoprim/Sulfamethoxazole   12/11/24 Urine from Straight Catheter Escherichia coli  S  S  S  S  S  S  S  S   Imaging  Reviewed        Assessment/Plan   Possible pneumonia, suspect aspiration  UTI, urine with E. coli  Leukocytosis  Right hip pressure injury  Acquired absence of the spleen  Encephalopathy / CVA     Recommendations :  ContinueUnasyn  Discussed with the medical team     I spent minutes in the professional and overall care of this patient.      Chele Watkins MD  "

## 2024-12-16 NOTE — PROGRESS NOTES
"  Subjective    Patient reports some slight improvement of right foot/right lower extremity weakness today, but still unable to lift the leg.  She reports right hand is getting better as well.  Patient denies chest pain, cough, shortness of breath, nausea or vomiting.  Nurse reports patient having 4-5 loose stools this morning.    Objective    Vitals  Visit Vitals  /69 (BP Location: Left arm, Patient Position: Lying)   Pulse 64   Temp 37.2 °C (99 °F) (Temporal)   Resp 16   Ht 1.575 m (5' 2\")   Wt 71.6 kg (157 lb 13.6 oz)   SpO2 93%   BMI 28.87 kg/m²   Smoking Status Never   BSA 1.77 m²       Physical Exam   General: Patient is alert. NAD.       HEENT: Clear sclera.  CVS: RRR.  Lungs: CTAB.   Abdomen: Soft.  NT. +BS.      Extremities: No pitting edema bilateral ankles.   Neurologic: NIH stroke score is 3 (3 points for right leg weakness).   Psychiatric: Cooperative.     IOs    Intake/Output Summary (Last 24 hours) at 12/16/2024 1408  Last data filed at 12/16/2024 1104  Gross per 24 hour   Intake 1280 ml   Output 400 ml   Net 880 ml       Labs:   Results from last 72 hours   Lab Units 12/16/24  0648 12/15/24  0602 12/14/24  0654   SODIUM mmol/L 138 139 142   POTASSIUM mmol/L 3.8 3.8 4.0   CHLORIDE mmol/L 103 106 104   CO2 mmol/L 24 25 27   BUN mg/dL 18 22 27*   CREATININE mg/dL 0.67 0.59 0.75   GLUCOSE mg/dL 210* 214* 240*   CALCIUM mg/dL 8.1* 8.3* 8.0*   ANION GAP mmol/L 15 12 15   EGFR mL/min/1.73m*2 84 87 77   PHOSPHORUS mg/dL 3.9 2.5 2.8      Results from last 72 hours   Lab Units 12/16/24  0648 12/15/24  0602 12/14/24  0654   WBC AUTO x10*3/uL 15.9* 16.2* 14.4*   HEMOGLOBIN g/dL 11.0* 11.2* 11.0*   HEMATOCRIT % 33.4* 34.8* 35.1*   PLATELETS AUTO x10*3/uL 370 393 364      Lab Results   Component Value Date    CALCIUM 8.1 (L) 12/16/2024    PHOS 3.9 12/16/2024      No results found for: \"CRP\"   [unfilled]       Images  XR chest 1 view  Narrative: Interpreted By:  Angle Burnette,   STUDY:  XR CHEST 1 VIEW;  " 12/15/2024 1:34 pm      INDICATION:  Signs/Symptoms:Leukocytosis.          COMPARISON:  Chest CT 2024      ACCESSION NUMBER(S):  [id number]      ORDERING CLINICIAN:  KAL WILKINSON      FINDINGS:  Artifact from overlying monitoring leads noted. Ovoid density in the  medial right apex without definite correlate on recent CT. Minimal  retrocardiac density with indistinctness of the left diaphragm. No  pleural effusion or pneumothorax. The cardiac silhouette is within  normal limits for size. Prominent tracheobronchial calcifications.      Impression: Minimal left basilar infiltrate.      Also ovoid opacity in the medial right apex without definite  correlate on recent chest CT 2024, therefore could be acute  rounded pneumonia or confluence of shadows. Clinical correlation and  attention at follow-up and/ or further evaluation with CT recommended.      MACRO:  None.      Signed by: Angle Burnette 12/15/2024 2:51 PM  Dictation workstation:   [id number]      Meds  Scheduled medications  amLODIPine, 10 mg, oral, Daily  ampicillin-sulbactam, 3 g, intravenous, q6h  atenolol, 50 mg, oral, Daily  atorvastatin, 80 mg, oral, Nightly  clopidogrel, 75 mg, oral, Daily  [Held by provider] enoxaparin, 40 mg, subcutaneous, q24h  insulin lispro, 0-10 Units, subcutaneous, TID AC  losartan, 25 mg, oral, Daily  perflutren lipid microspheres, 0.5-10 mL of dilution, intravenous, Once in imaging  perflutren protein A microsphere, 0.5 mL, intravenous, Once in imaging  sulfur hexafluoride microsphr, 2 mL, intravenous, Once in imaging      Continuous medications     PRN medications  PRN medications: acetaminophen **OR** acetaminophen **OR** acetaminophen, dextrose, dextrose, glucagon, glucagon, [] hydrALAZINE **FOLLOWED BY** hydrALAZINE, oxygen     Assessment and Plan    Soni Herrera is a 89 y.o. female with past medical history of diabetes, hypertension, hyperlipidemia, stage IIIa CKD, history of diffuse large B-cell  lymphoma status post mini-R-CHOP x 6 and splenectomy in 9/2014, who came to hospital secondary to fall and admitted to the hospital with acute stroke, UTI.      Acute stroke  -CT of the head with acute left anterior cerebral artery infarct with no evidence for hemorrhage.  -ER provider spoke with Dr. Bill, neurology at [facility], who did not recommend any acute interventions.  -MRI of the brain with subacute ischemic infarct in the left anterior cerebral artery territory along the left parasagittal frontal lobe associated with mild petechial cortical hemorrhage.   -MRA of the brain and neck were also performed.  -Patient had an episode of increased weakness of the right upper extremity on 12/12.  Repeat CT scan was performed which revealed stable small acute to subacute nonhemorrhagic infarct in the parasagittal mid left parietal white matter just above the body of the left lateral ventricle. Dr. Ruiz, neurologist, was also made aware of the CT scan findings and I discussed the case with him as well at that time.  -Neurology recommended to switch from aspirin 81 mg daily, which patient reports she was taking prior to admission, to Plavix 75 mg daily.  Neurology recommends atorvastatin 80 mg nightly and Holter monitor upon discharge.  -Echo was performed on 12/12/2024 with no evidence of a patent foramen ovale.  -PT/OT are following.  -Restarted atenolol and losartan on 12/14 and restarted amlodipine on 12/15, as meds had been on hold to allow for permissive hypertension.    -Monitor.     Leukocytosis with possible pneumonia  -Patient also has history of lymphoma as well.  -WBC down to 15.9 today; 16.2 on 12/15; 14.4 on 12/14; 12.5 on 12/13.    -Patient was being treated for UTI with Rocephin for E. coli, which was pansensitive although ceftriaxone was not specifically listed.  Changed Rocephin to oral Cipro on 12/14.  -Chest x-ray on 12/15 with concern for pneumonia, and change antibiotics to Zosyn.  -Incentive  spirometer.  -Today, 12/16, patient with multiple episodes of loose stools and sending stool for C. difficile.  -Will consult speech therapy to rule out dysphagia.  -Await further recommendations from ID.    Diarrhea  -Will check stool for C. difficile, as patient with 4-5 loose bowel movements today, 12/16.  -Monitor.     UTI  -Urine culture grew E. coli.  -As stated above, we changed Rocephin to renally dosed Cipro on 12/14, And subsequent changed to Zosyn on 12/15 due to concern for pneumonia.  -Monitor.     Mildly elevated CPK  -Likely secondary to fall and patient being on the ground.  -Resolved.      Elevated troponin  -Likely secondary to demand ischemia.  Patient denies chest pain but is not a good historian of the last few days.  -I reviewed patient's EKG which reveals normal sinus rhythm with no acute ST-T changes  -Echo was performed on 12/12/2024.  -Cardiology following and patient will need further cardiac ambulatory monitoring on discharge.     Right lower extremity skin changes  -Likely secondary to fall.  Patient on Rocephin for UTI as well.    -Monitor.     Hypomagnesemia  -Resolved.  Monitor.       Lumbar spine deformity  -L4 chronic deformity found on CT scan.  Will monitor and follow-up with PCP as an outpatient.     Diabetes  -Holding home medication of metformin.  -HbA1c was 7.7 on 12/11/2024.  -Continue SSI and monitor.     Hypertension  -We had been holding home meds of amlodipine, losartan and atenolol to allow for permissive hypertension in the setting of acute CVA.  -Restarted losartan, atenolol on 12/14 and restarted amlodipine on 12/15.   -Continue to monitor and patient has as needed oral hydralazine ordered as well.  -Monitor.     History of diffuse large B-cell lymphoma  -Recommend follow-up with her regular oncologist as an outpatient and was seen by Dr. Means on 2/16/24.      Hyperlipidemia  -Placed patient on atorvastatin 80 mg daily due to acute CVA.     Stage IIIa CKD  -Renal  function is around baseline.  Monitor.     DVT prophylaxis  -SCDs and holding Lovenox subcu in the setting of petechial hemorrhages on the brain on MRI.    Disposition: Starting antibiotics for possible pneumonia, although patient not hypoxic.  Consulting speech therapy to evaluate for possible dysphagia.  Stool studies pending to rule out C. difficile.  Also awaiting precertification for SNF placement.

## 2024-12-17 LAB
ATRIAL RATE: 66 BPM
BASOPHILS # BLD AUTO: 0.06 X10*3/UL (ref 0–0.1)
BASOPHILS NFR BLD AUTO: 0.3 %
C DIF TOX TCDA+TCDB STL QL NAA+PROBE: NOT DETECTED
EOSINOPHIL # BLD AUTO: 0.7 X10*3/UL (ref 0–0.4)
EOSINOPHIL NFR BLD AUTO: 3.8 %
ERYTHROCYTE [DISTWIDTH] IN BLOOD BY AUTOMATED COUNT: 13.4 % (ref 11.5–14.5)
GLUCOSE BLD MANUAL STRIP-MCNC: 155 MG/DL (ref 74–99)
GLUCOSE BLD MANUAL STRIP-MCNC: 175 MG/DL (ref 74–99)
GLUCOSE BLD MANUAL STRIP-MCNC: 177 MG/DL (ref 74–99)
GLUCOSE BLD MANUAL STRIP-MCNC: 216 MG/DL (ref 74–99)
HCT VFR BLD AUTO: 37.6 % (ref 36–46)
HGB BLD-MCNC: 12 G/DL (ref 12–16)
IMM GRANULOCYTES # BLD AUTO: 0.29 X10*3/UL (ref 0–0.5)
IMM GRANULOCYTES NFR BLD AUTO: 1.6 % (ref 0–0.9)
LYMPHOCYTES # BLD AUTO: 1.53 X10*3/UL (ref 0.8–3)
LYMPHOCYTES NFR BLD AUTO: 8.3 %
MAGNESIUM SERPL-MCNC: 1.57 MG/DL (ref 1.6–2.4)
MCH RBC QN AUTO: 31.6 PG (ref 26–34)
MCHC RBC AUTO-ENTMCNC: 31.9 G/DL (ref 32–36)
MCV RBC AUTO: 99 FL (ref 80–100)
MONOCYTES # BLD AUTO: 1.11 X10*3/UL (ref 0.05–0.8)
MONOCYTES NFR BLD AUTO: 6 %
NEUTROPHILS # BLD AUTO: 14.67 X10*3/UL (ref 1.6–5.5)
NEUTROPHILS NFR BLD AUTO: 80 %
NRBC BLD-RTO: 0.1 /100 WBCS (ref 0–0)
P AXIS: 84 DEGREES
P OFFSET: 198 MS
P ONSET: 148 MS
PLATELET # BLD AUTO: 380 X10*3/UL (ref 150–450)
PR INTERVAL: 158 MS
Q ONSET: 227 MS
QRS COUNT: 11 BEATS
QRS DURATION: 82 MS
QT INTERVAL: 462 MS
QTC CALCULATION(BAZETT): 484 MS
QTC FREDERICIA: 477 MS
R AXIS: -23 DEGREES
RBC # BLD AUTO: 3.8 X10*6/UL (ref 4–5.2)
T AXIS: 64 DEGREES
T OFFSET: 458 MS
VENTRICULAR RATE: 66 BPM
WBC # BLD AUTO: 18.4 X10*3/UL (ref 4.4–11.3)

## 2024-12-17 PROCEDURE — 2500000001 HC RX 250 WO HCPCS SELF ADMINISTERED DRUGS (ALT 637 FOR MEDICARE OP): Performed by: INTERNAL MEDICINE

## 2024-12-17 PROCEDURE — 2500000002 HC RX 250 W HCPCS SELF ADMINISTERED DRUGS (ALT 637 FOR MEDICARE OP, ALT 636 FOR OP/ED): Performed by: INTERNAL MEDICINE

## 2024-12-17 PROCEDURE — 85025 COMPLETE CBC W/AUTO DIFF WBC: CPT | Performed by: INTERNAL MEDICINE

## 2024-12-17 PROCEDURE — 94760 N-INVAS EAR/PLS OXIMETRY 1: CPT

## 2024-12-17 PROCEDURE — 83735 ASSAY OF MAGNESIUM: CPT | Performed by: INTERNAL MEDICINE

## 2024-12-17 PROCEDURE — 92610 EVALUATE SWALLOWING FUNCTION: CPT | Mod: GN

## 2024-12-17 PROCEDURE — 82947 ASSAY GLUCOSE BLOOD QUANT: CPT

## 2024-12-17 PROCEDURE — 1200000002 HC GENERAL ROOM WITH TELEMETRY DAILY

## 2024-12-17 PROCEDURE — 36415 COLL VENOUS BLD VENIPUNCTURE: CPT | Performed by: INTERNAL MEDICINE

## 2024-12-17 PROCEDURE — 2500000004 HC RX 250 GENERAL PHARMACY W/ HCPCS (ALT 636 FOR OP/ED): Performed by: INTERNAL MEDICINE

## 2024-12-17 PROCEDURE — 99233 SBSQ HOSP IP/OBS HIGH 50: CPT | Performed by: FAMILY MEDICINE

## 2024-12-17 ASSESSMENT — COGNITIVE AND FUNCTIONAL STATUS - GENERAL
WALKING IN HOSPITAL ROOM: TOTAL
DRESSING REGULAR UPPER BODY CLOTHING: A LITTLE
HELP NEEDED FOR BATHING: A LOT
MOVING TO AND FROM BED TO CHAIR: A LOT
MOVING TO AND FROM BED TO CHAIR: A LOT
EATING MEALS: A LITTLE
CLIMB 3 TO 5 STEPS WITH RAILING: TOTAL
DRESSING REGULAR LOWER BODY CLOTHING: A LOT
STANDING UP FROM CHAIR USING ARMS: A LOT
PERSONAL GROOMING: A LITTLE
MOVING FROM LYING ON BACK TO SITTING ON SIDE OF FLAT BED WITH BEDRAILS: A LOT
PERSONAL GROOMING: A LITTLE
MOVING FROM LYING ON BACK TO SITTING ON SIDE OF FLAT BED WITH BEDRAILS: A LOT
MOBILITY SCORE: 10
MOVING TO AND FROM BED TO CHAIR: A LOT
DRESSING REGULAR UPPER BODY CLOTHING: A LITTLE
PERSONAL GROOMING: A LITTLE
WALKING IN HOSPITAL ROOM: TOTAL
DRESSING REGULAR UPPER BODY CLOTHING: A LITTLE
CLIMB 3 TO 5 STEPS WITH RAILING: TOTAL
TOILETING: A LOT
TURNING FROM BACK TO SIDE WHILE IN FLAT BAD: A LOT
MOBILITY SCORE: 10
MOBILITY SCORE: 10
DRESSING REGULAR LOWER BODY CLOTHING: A LOT
STANDING UP FROM CHAIR USING ARMS: A LOT
DAILY ACTIVITIY SCORE: 15
EATING MEALS: A LITTLE
TOILETING: A LOT
TURNING FROM BACK TO SIDE WHILE IN FLAT BAD: A LOT
DAILY ACTIVITIY SCORE: 15
WALKING IN HOSPITAL ROOM: TOTAL
DRESSING REGULAR LOWER BODY CLOTHING: A LOT
DAILY ACTIVITIY SCORE: 15
EATING MEALS: A LITTLE
STANDING UP FROM CHAIR USING ARMS: A LOT
MOVING FROM LYING ON BACK TO SITTING ON SIDE OF FLAT BED WITH BEDRAILS: A LOT
CLIMB 3 TO 5 STEPS WITH RAILING: TOTAL
HELP NEEDED FOR BATHING: A LOT
TURNING FROM BACK TO SIDE WHILE IN FLAT BAD: A LOT
HELP NEEDED FOR BATHING: A LOT
TOILETING: A LOT

## 2024-12-17 ASSESSMENT — PAIN - FUNCTIONAL ASSESSMENT
PAIN_FUNCTIONAL_ASSESSMENT: 0-10

## 2024-12-17 ASSESSMENT — PAIN SCALES - GENERAL
PAINLEVEL_OUTOF10: 0 - NO PAIN
PAINLEVEL_OUTOF10: 0 - NO PAIN
PAINLEVEL_OUTOF10: 4

## 2024-12-17 NOTE — PROGRESS NOTES
12/17/24 1141   Discharge Planning   Expected Discharge Disposition SNF  ([facility], precert obtained, auth number is [id number]. good for 12/17-12/23 NRD 12/23 Fax to [phone] nurse reviewer number is [phone] code was I63)

## 2024-12-17 NOTE — PROGRESS NOTES
"Speech-Language Pathology    Speech-Language Pathology Clinical Swallow Evaluation    Patient Name: Soni Herrera  MRN: [id number]  : 1935  Today's Date: 24  Start Time: 910  Stop Time: 945  Time Calculation (min): 35 min      ASSESSMENT  Impressions:  Normal swallow based on clinical swallow evaluation.  It is possible that absent oral care and limited mobility have contributed to the patient's onset of pneumonia.   Encouraged oral care 5 times a day (first thing, last thing, and after each meal) in addition to regular use of incentive spirometer.       Prognosis: Good    PLAN  Recommendations:  Is MBSS recommended? No; no pharyngeal dysphagia suspected.  Solid consistency: Regular (IDDSI level 7)  Liquid consistency: Thin (IDDSI 0)  Medication administration: Whole, in thin liquid  Compensatory swallow strategies:  - Upright positioning for all PO intake  - regular incentive spirometer use  - consistent oral care 5 times a day    Recommended frequency/duration:  Skilled SLP services recommended: No    SUBJECTIVE    PMHx relevant to rehab:     Chief complaint: Pt was admitted on 24 due to   Chief Complaint   Patient presents with    Fall     Pt with CO fall this am per pt.   found her on the ground and states that she has not been over for a week.  Pt was soiled in urine but no stool noted.  Pts bathroom was \"tore up\" according to EMS.  Pt has a large Red area on her R hip.     . She was found to have Stroke with cerebral ischemia (Multi).    Relevant imaging results:  12/15/24 Chest x ray  IMPRESSION:  Minimal left basilar infiltrate.    General Visit Information:  SLP Received On: 24  Patient Class: Inpatient  Living Environment: Home  Ordering Physician: Dr. Barry  Reason for Referral: concern for aspiration as cause for pneumonia.  Prior to Session Communication: Bedside nurse    RN cleared pt to participate in session and reported that she has not had difficulty eating/ " swallowing that has been noticed by nursing.     Pt denies dysphagia, but admits to not having brushed her teeth or participated in any oral care since arriving here.    [employer] Diet: Regular/thin  Current Diet : regular, thin    Status at time of evaluation:  Pain Assessment  Pain Assessment: 0-10  0-10 (Numeric) Pain Score: 0 - No pain  Pt was alert, pleasant, cooperative, and attentive for session.  Orientation: Ox4  Ability to follow functional commands: WFL  Nutritional status: Appears well-nourished/no concerns  Respiratory status: Room air  Baseline Vocal Quality: Normal  Volitional Cough: Strong  Volitional Swallow: Within Functional Limits  Patient positioning: Upright in bed      OBJECTIVE  Clinical swallow evaluation completed and consisted of interview, oral motor assessment, and PO trials (cracker, applesauce and water).  ORAL PHASE: Natural dentition in good condition. Oral mucosa were pink, moist, and free of obvious lesions. Lingual strength and ROM were normal. Labial strength/ROM were normal. Labial seal was adequate. Mastication of regular solids was timely and efficient. A/P transit and oral clearance were normal.  PHARYNGEAL PHASE: Laryngeal elevation was visualized or palpated with all trials, however adequacy of hyolaryngeal elevation/excursion cannot be determined at bedside. No immediate or delayed s/sx aspiration/penetration were observed with any consistencies.    Was 3oz challenge administered: Yes; pt unable to successfully complete due to taking breaks. No overt s/sx aspiration were observed.      Treatment/Education:  Results and recommendations were relayed to: Patient and nurse  Education provided: Yes   Learner: Patient   Barriers to learning: None   Method of teaching: Verbal, Written, and Demonstration   Topic: role of ST, results of assessment, risk for aspiration, recommended safe swallow strategies, and swallow anatomy/physiology, recommendation for oral care and use of  incentive spirometer.    Outcome of teaching: Pt/family demonstrated good understanding  Treatment provided: No

## 2024-12-17 NOTE — CARE PLAN
Problem: General Stroke  Goal: Maintain BP within ordered limits throughout shift  Outcome: Progressing  Goal: No symptoms of aspiration throughout shift  Outcome: Progressing  Goal: Controlled blood glucose throughout shift  Outcome: Progressing     Problem: Skin  Goal: Prevent/manage excess moisture  Outcome: Progressing  Goal: Prevent/minimize sheer/friction injuries  Outcome: Progressing  Goal: Promote skin healing  Outcome: Progressing   The patient's goals for the shift include going to rehab to regain strength    The clinical goals for the shift include no further decline with neuro checks.    Over the shift, the patient did not make progress toward the following goals. Barriers to progression include none. Recommendations to address these barriers include none.

## 2024-12-17 NOTE — PROGRESS NOTES
Soni Herrera is a 89 y.o. female on day 6 of admission presenting with Stroke with cerebral ischemia (Multi).    Subjective   Interval History: no fever, no new complaints        Review of Systems    Objective   Range of Vitals (last 24 hours)  Heart Rate:  [60-83]   Temp:  [36.6 °C (97.9 °F)-37.3 °C (99.1 °F)]   Resp:  [14-16]   BP: (115-149)/(69-77)   SpO2:  [92 %-98 %]   Daily Weight  12/11/24 : 71.6 kg (157 lb 13.6 oz)    Body mass index is 28.87 kg/m².    Physical Exam  Constitutional:       Appearance: Normal appearance.   HENT:      Head: Normocephalic and atraumatic.      Mouth/Throat:      Mouth: Mucous membranes are moist.      Pharynx: Oropharynx is clear.   Eyes:      Pupils: Pupils are equal, round, and reactive to light.   Cardiovascular:      Rate and Rhythm: Normal rate and regular rhythm.      Heart sounds: Normal heart sounds.   Pulmonary:      Effort: Pulmonary effort is normal.      Breath sounds: Normal breath sounds.   Abdominal:      General: Abdomen is flat. Bowel sounds are normal.      Palpations: Abdomen is soft.   Musculoskeletal:      Cervical back: Normal range of motion.   Neurological:      Mental Status: She is alert.         Antibiotics  ampicillin-sulbactam - 3 gram/100 mL    Relevant Results  Labs  Results from last 72 hours   Lab Units 12/17/24  0617 12/16/24  0648 12/15/24  0602   WBC AUTO x10*3/uL 18.4* 15.9* 16.2*   HEMOGLOBIN g/dL 12.0 11.0* 11.2*   HEMATOCRIT % 37.6 33.4* 34.8*   PLATELETS AUTO x10*3/uL 380 370 393   NEUTROS PCT AUTO % 80.0  --   --    LYMPHS PCT AUTO % 8.3  --   --    MONOS PCT AUTO % 6.0  --   --    EOS PCT AUTO % 3.8  --   --      Results from last 72 hours   Lab Units 12/16/24  1435 12/16/24  0648 12/15/24  0602   SODIUM mmol/L 137 138 139   POTASSIUM mmol/L 3.6 3.8 3.8   CHLORIDE mmol/L 102 103 106   CO2 mmol/L 25 24 25   BUN mg/dL 19 18 22   CREATININE mg/dL 0.78 0.67 0.59   GLUCOSE mg/dL 248* 210* 214*   CALCIUM mg/dL 7.9* 8.1* 8.3*   ANION GAP mmol/L  "14 15 12   EGFR mL/min/1.73m*2 73 84 87   PHOSPHORUS mg/dL 3.4 3.9 2.5     Results from last 72 hours   Lab Units 12/16/24  1435 12/16/24  0648 12/15/24  0602   ALK PHOS U/L  --   --  66   BILIRUBIN TOTAL mg/dL  --   --  0.5   PROTEIN TOTAL g/dL  --   --  5.9*   ALT U/L  --   --  17   AST U/L  --   --  22   ALBUMIN g/dL 2.8* 2.7* 2.9*     Estimated Creatinine Clearance: 46.2 mL/min (by C-G formula based on SCr of 0.78 mg/dL).  No results found for: \"CRP\"  Microbiology  Susceptibility data from last 14 days.  Collected Specimen Info Organism Ampicillin Cefazolin Cefazolin (uncomplicated UTIs only) Ciprofloxacin Gentamicin Nitrofurantoin Piperacillin/Tazobactam Trimethoprim/Sulfamethoxazole   12/11/24 Urine from Straight Catheter Escherichia coli  S  S  S  S  S  S  S  S   Imaging  Reviewed        Assessment/Plan   Possible pneumonia, suspect aspiration  UTI, urine with E. coli  Leukocytosis, fluctuating  Right hip pressure injury  Acquired absence of the spleen  Encephalopathy / CVA     Recommendations :  Continue Unasyn  Follow the WBC  Discussed with the medical team     I spent minutes in the professional and overall care of this patient.      Chele Watkins MD  "

## 2024-12-17 NOTE — PROGRESS NOTES
Soni Herrera is a 89 y.o. female on day 6 of admission presenting with Stroke with cerebral ischemia (Multi).      Subjective   No acute events overnight       Objective     Last Recorded Vitals  /77   Pulse 71   Temp 36.9 °C (98.4 °F)   Resp 16   Wt 71.6 kg (157 lb 13.6 oz)   SpO2 96%   Intake/Output last 3 Shifts:    Intake/Output Summary (Last 24 hours) at 12/17/2024 0808  Last data filed at 12/17/2024 0431  Gross per 24 hour   Intake 1460 ml   Output 450 ml   Net 1010 ml       Admission Weight  Weight: 70.6 kg (155 lb 10.3 oz) (12/11/24 1121)    Daily Weight  12/11/24 : 71.6 kg (157 lb 13.6 oz)    Image Results  XR chest 1 view  Narrative: Interpreted By:  Angle Burnette,   STUDY:  XR CHEST 1 VIEW;  12/15/2024 1:34 pm      INDICATION:  Signs/Symptoms:Leukocytosis.          COMPARISON:  Chest CT 12/11/2024      ACCESSION NUMBER(S):  [id number]      ORDERING CLINICIAN:  KAL WILKINSON      FINDINGS:  Artifact from overlying monitoring leads noted. Ovoid density in the  medial right apex without definite correlate on recent CT. Minimal  retrocardiac density with indistinctness of the left diaphragm. No  pleural effusion or pneumothorax. The cardiac silhouette is within  normal limits for size. Prominent tracheobronchial calcifications.      Impression: Minimal left basilar infiltrate.      Also ovoid opacity in the medial right apex without definite  correlate on recent chest CT 12/11/2024, therefore could be acute  rounded pneumonia or confluence of shadows. Clinical correlation and  attention at follow-up and/ or further evaluation with CT recommended.      MACRO:  None.      Signed by: Angle Burnette 12/15/2024 2:51 PM  Dictation workstation:   [id number]      Physical Exam    General: Patient is alert. NAD.       HEENT: Clear sclera.  CVS: RRR.  Lungs: CTAB.   Abdomen: Soft.  NT. +BS.      Extremities: No pitting edema bilateral ankles.   Neurologic: Cranial nerves II through XII are intact, mild  weakness in right lower extremity, retained sensation throughout  Psychiatric: Cooperative.       Assessment/Plan      Soni Herrera is a 89 y.o. 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      Acute stroke  -CT of the head with acute left anterior cerebral artery infarct with no evidence for hemorrhage.  -ER provider spoke with Dr. Bill, neurology at [facility], who did not recommend any acute interventions.  -MRI of the brain with subacute ischemic infarct in the left anterior cerebral artery territory along the left parasagittal frontal lobe associated with mild petechial cortical hemorrhage.   -MRA of the brain and neck were also performed.  -Patient had an episode of increased weakness of the right upper extremity on 12/12.  Repeat CT scan was performed which revealed stable small acute to subacute nonhemorrhagic infarct in the parasagittal mid left parietal white matter just above the body of the left lateral ventricle. Dr. Ruiz, neurologist, was also made aware of the CT scan findings  -Neurology recommended to switch from aspirin 81 mg daily, which patient reports she was taking prior to admission, to Plavix 75 mg daily.  Neurology recommends atorvastatin 80 mg nightly and Holter monitor upon discharge.  -Echo was performed on 12/12/2024 with no evidence of a patent foramen ovale.  -PT/OT are following.  -Restarted atenolol and losartan on 12/14 and restarted amlodipine on 12/15     Leukocytosis with possible pneumonia  -Patient also has history of lymphoma as well.  -WBC increased slightly overnight  -Patient was being treated for UTI with Rocephin for E. coli, which was pansensitive although ceftriaxone was not specifically listed.  Changed Rocephin to oral Cipro on  12/14.  -Chest x-ray on 12/15 with concern for pneumonia, and change antibiotics to Zosyn.  -Incentive spirometer.  -2/16, patient with multiple episodes of loose stools and sending stool for C. Difficile /C. difficile PCR pending  -Will consult speech therapy to rule out dysphagia.  -ID switched agustin to Unasyn     Diarrhea  -Patient with 4-5 loose bowel movements on  12/16.  C. difficile PCR pending     UTI  -Urine culture grew E. coli.  -As stated above, we changed Rocephin to renally dosed Cipro on 12/14, And subsequent changed to Zosyn on 12/15 due to concern for pneumonia.  The has switched her to Unasyn     Mildly elevated CPK  -Likely secondary to fall and patient being on the ground.  -Resolved.      Elevated troponin  -Likely secondary to demand ischemia.  Patient denies chest pain but is not a good historian of the last few days.  -EKG which reveals normal sinus rhythm with no acute ST-T changes  -Echo was performed on 12/12/2024.  -Cardiology following and patient will need further cardiac ambulatory monitoring on discharge.     Right lower extremity skin changes  -Likely secondary to fall.      Hypomagnesemia  -Resolved.  Monitor.       Lumbar spine deformity  -L4 chronic deformity found on CT scan.    Will monitor and follow-up with PCP as an outpatient.     Diabetes  -Holding home medication of metformin.  -HbA1c was 7.7 on 12/11/2024.  -Continue SSI and monitor.     Hypertension  -Restarted losartan, atenolol on 12/14 and restarted amlodipine on 12/15.   -Continue to monitor and patient has as needed oral hydralazine ordered as well.     History of diffuse large B-cell lymphoma  -Recommend follow-up with her regular oncologist as an outpatient and was seen by Dr. Means on 2/16/24.      Hyperlipidemia  -Placed patient on atorvastatin 80 mg daily due to acute CVA.     Stage IIIa CKD  -Renal function is around baseline.  Monitor.     DVT prophylaxis  -SCDs and holding Lovenox subcu in the setting of  petechial hemorrhages on the brain on MRI.     Disposition: Continue treatment for pneumonia and urinary tract infection with Unasyn per ID recommendations.  Awaiting C. difficile PCR  Will likely be stable for discharge to a SNF in the next 24 - 48 hours    Lorenzo Das DO

## 2024-12-18 VITALS
HEIGHT: 62 IN | RESPIRATION RATE: 16 BRPM | SYSTOLIC BLOOD PRESSURE: 152 MMHG | WEIGHT: 157.85 LBS | BODY MASS INDEX: 29.05 KG/M2 | OXYGEN SATURATION: 94 % | HEART RATE: 69 BPM | DIASTOLIC BLOOD PRESSURE: 60 MMHG | TEMPERATURE: 99 F

## 2024-12-18 LAB
GLUCOSE BLD MANUAL STRIP-MCNC: 178 MG/DL (ref 74–99)
GLUCOSE BLD MANUAL STRIP-MCNC: 194 MG/DL (ref 74–99)

## 2024-12-18 PROCEDURE — 99239 HOSP IP/OBS DSCHRG MGMT >30: CPT | Performed by: FAMILY MEDICINE

## 2024-12-18 PROCEDURE — 2500000002 HC RX 250 W HCPCS SELF ADMINISTERED DRUGS (ALT 637 FOR MEDICARE OP, ALT 636 FOR OP/ED): Performed by: INTERNAL MEDICINE

## 2024-12-18 PROCEDURE — 2500000004 HC RX 250 GENERAL PHARMACY W/ HCPCS (ALT 636 FOR OP/ED): Performed by: INTERNAL MEDICINE

## 2024-12-18 PROCEDURE — 2500000001 HC RX 250 WO HCPCS SELF ADMINISTERED DRUGS (ALT 637 FOR MEDICARE OP): Performed by: INTERNAL MEDICINE

## 2024-12-18 PROCEDURE — 82947 ASSAY GLUCOSE BLOOD QUANT: CPT

## 2024-12-18 PROCEDURE — 97535 SELF CARE MNGMENT TRAINING: CPT | Mod: GO,CO

## 2024-12-18 RX ORDER — ZINC OXIDE 20 G/100G
1 OINTMENT TOPICAL AS NEEDED
Qty: 90 G | Refills: 0 | Status: SHIPPED | OUTPATIENT
Start: 2024-12-18

## 2024-12-18 RX ORDER — CLOPIDOGREL BISULFATE 75 MG/1
75 TABLET ORAL DAILY
Start: 2024-12-19

## 2024-12-18 RX ORDER — HYDROCORTISONE 1 %
CREAM (GRAM) TOPICAL 2 TIMES DAILY
Start: 2024-12-18

## 2024-12-18 RX ORDER — HYDROCORTISONE 1 %
CREAM (GRAM) TOPICAL 2 TIMES DAILY
Status: DISCONTINUED | OUTPATIENT
Start: 2024-12-18 | End: 2024-12-18 | Stop reason: HOSPADM

## 2024-12-18 RX ORDER — ATORVASTATIN CALCIUM 80 MG/1
80 TABLET, FILM COATED ORAL NIGHTLY
Start: 2024-12-18

## 2024-12-18 ASSESSMENT — COGNITIVE AND FUNCTIONAL STATUS - GENERAL
DRESSING REGULAR LOWER BODY CLOTHING: TOTAL
DAILY ACTIVITIY SCORE: 15
TOILETING: A LOT
TOILETING: TOTAL
MOBILITY SCORE: 9
HELP NEEDED FOR BATHING: A LOT
STANDING UP FROM CHAIR USING ARMS: TOTAL
WALKING IN HOSPITAL ROOM: TOTAL
PERSONAL GROOMING: A LITTLE
DRESSING REGULAR LOWER BODY CLOTHING: A LOT
DRESSING REGULAR UPPER BODY CLOTHING: A LOT
CLIMB 3 TO 5 STEPS WITH RAILING: TOTAL
TURNING FROM BACK TO SIDE WHILE IN FLAT BAD: A LOT
HELP NEEDED FOR BATHING: A LOT
DRESSING REGULAR UPPER BODY CLOTHING: A LOT
DAILY ACTIVITIY SCORE: 12
MOVING FROM LYING ON BACK TO SITTING ON SIDE OF FLAT BED WITH BEDRAILS: A LOT
PERSONAL GROOMING: A LITTLE
EATING MEALS: A LITTLE
MOVING TO AND FROM BED TO CHAIR: A LOT

## 2024-12-18 ASSESSMENT — PAIN - FUNCTIONAL ASSESSMENT
PAIN_FUNCTIONAL_ASSESSMENT: 0-10
PAIN_FUNCTIONAL_ASSESSMENT: 0-10

## 2024-12-18 ASSESSMENT — PAIN SCALES - GENERAL
PAINLEVEL_OUTOF10: 0 - NO PAIN
PAINLEVEL_OUTOF10: 0 - NO PAIN

## 2024-12-18 ASSESSMENT — ACTIVITIES OF DAILY LIVING (ADL): HOME_MANAGEMENT_TIME_ENTRY: 14

## 2024-12-18 NOTE — CARE PLAN
The patient's goals for the shift include having decreased right hip and leg pain    The clinical goals for the shift include pt will show no neurological changes during this shift    Over the shift, the patient did not show any neurological changes over the shift. She did have some right hip and leg pain over the shift that was relieved with tylenol, massaging, heat packs and repostioning.

## 2024-12-18 NOTE — PROGRESS NOTES
Occupational Therapy    Occupational Therapy Treatment    Name: Soni Herrera  MRN: [id number]  Department: [address]  Room: [address] [place] [address]  Date: 12/18/24  Time Calculation  Start Time: 1038  Stop Time: 1052  Time Calculation (min): 14 min    Assessment:  OT Assessment:  (Pt continues to present with acute neurological deficits resulting in impaired mobility and decreased ADL performance. Will benefit from skilled OT services to increase functional outcomes)  Prognosis: Good  Barriers to Discharge Home: Caregiver assistance, Physical needs  Caregiver Assistance: Patient lives alone and/or does not have reliable caregiver assistance  Physical Needs: Stair navigation to access bed limited by function/safety, Ambulating household distances limited by function/safety, 24hr mobility assistance needed, 24hr ADL assistance needed, High falls risk due to function or environment  Evaluation/Treatment Tolerance: Patient tolerated treatment well  Medical Staff Made Aware: Yes  End of Session Communication: Bedside nurse  End of Session Patient Position: Bed, 3 rail up, Alarm on (Nurse notified at end of session of pts increased fatigue)  Plan:  Treatment Interventions: ADL retraining, Functional transfer training, UE strengthening/ROM, Neuromuscular reeducation, Compensatory technique education  OT Frequency: 3 times per week  OT Discharge Recommendations: Moderate intensity level of continued care  Equipment Recommended upon Discharge:  (TBD)  OT - OK to Discharge: Yes    Subjective   Previous Visit Info:  OT Last Visit  OT Received On: 12/18/24  General:  General  Reason for Referral: 89 yo female admit with acute ischemic left ANASTACIO stroke; referred to PT for impaired functional mobility  Referred By: Dorian Barry  Past Medical History Relevant to Rehab: history of diabetes, hypertension, hyperlipidemia, stage IIIa CKD, 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 Pt was agreeable and pleasent however, wanted to remain in bed for tx. Nurse notified at end of session.)  Precautions:  Medical Precautions: Fall precautions  Precautions Comment: tony sheriff B SCD's, pillow positioned for heel pressure relief    Vital Signs (Past 2hrs)                Pain Assessment:  Pain Assessment  Pain Assessment: 0-10  0-10 (Numeric) Pain Score: 0 - No pain (pt reported no pain this date)     Objective   Cognition:  Overall Cognitive Status: Within Functional Limits  Orientation Level: Oriented X4  Activities of Daily Living: Grooming  Grooming Level of Assistance: Minimum assistance  Grooming Where Assessed: Bed level  Grooming Comments:  (Pt required Min A to apply lotion onto hand and pt able to apply lotion onto self ind)    Functional Standing Tolerance:     Bed Mobility/Transfers: Bed Mobility  Bed Mobility: No    Transfers  Transfer: No            Therapy/Activity: Therapeutic Exercise  Therapeutic Exercise Performed: Yes  Therapeutic Exercise Activity 1:  (Pt hesitant d/t fatigue and required encouragement to perform task with education on importance and pt agreeable. B shoulder flex/ext, elbow flex/ext against gravity x15 reps 1 sets to increase strength and Ind in ADLs.)       Outcome Measures:  [facility] Daily Activity  Putting on and taking off regular lower body clothing: Total  Bathing (including washing, rinsing, drying): A lot  Putting on and taking off regular upper body clothing: A lot  Toileting, which includes using toilet, bedpan or urinal: Total  Taking care of personal grooming such as brushing teeth: A little  Eating Meals: A little  Daily Activity - Total Score: 12        Education Documentation  ADL Training, taught by CAROLYN Estrada at 12/18/2024 11:24 AM.  Learner: Patient  Readiness: Acceptance  Method:  Explanation  Response: Verbalizes Understanding    Body Mechanics, taught by CAROLYN Estrada at 12/18/2024 11:24 AM.  Learner: Patient  Readiness: Acceptance  Method: Explanation  Response: Verbalizes Understanding    Education Comments  Pt educated on importance of moving R UE 1-2x per day to continue to increase strength/coordination. Carryover of education F       Goals:  Encounter Problems       Encounter Problems (Active)       OT Goals       Pt will complete bed mobility activities with Kellen (Progressing)       Start:  12/13/24    Expected End:  12/27/24            Pt will demo functional transfers to/ from EOB, chair and commode with ModA and LRD (Progressing)       Start:  12/13/24    Expected End:  12/27/24            Pt will demo ADL routine and meaningful daily activities with Kellen using modifications as needed  (Progressing)       Start:  12/13/24    Expected End:  12/27/24            Pt will demo improved static/ dynamic sitting balance, evidenced by completion of ADL or functional activity with < CG assist for duration of activity.   (Progressing)       Start:  12/13/24    Expected End:  12/27/24            Pt will complete A/AAROM BUE exercises, 10-15 reps 1-2 sets in all functional planes, to demo improved functional strength and NM re-edu for increased participation in ADL and mobility  (Progressing)       Start:  12/13/24    Expected End:  12/27/24

## 2024-12-18 NOTE — DISCHARGE SUMMARY
Discharge Diagnosis  Stroke with cerebral ischemia, UTI with E. coli, pneumonia, aspiration pneumonia ruled out, diarrhea, C. difficile colitis ruled out, demand Ischemia, hypomagnesemia, L4 chronic deformity, diabetes mellitus type 2, HTN, HLD, CKD IIIA    Issues Requiring Follow-Up  Follow up with Cardiology for cardiac monitoring   Follow up with Neurology for continued surveillance  Follow up with PCP for hospital follow-up and L4 deformity on CT which is reportedly chronic    Discharge Meds     Medication List      START taking these medications     atorvastatin 80 mg tablet; Commonly known as: Lipitor; Take 1 tablet (80   mg) by mouth once daily at bedtime.   clopidogrel 75 mg tablet; Commonly known as: Plavix; Take 1 tablet (75   mg) by mouth once daily.; Start taking on: December 19, 2024   zinc oxide 40 % ointment ointment; Apply 1 Application topically if   needed (diaper rash).     CONTINUE taking these medications     amLODIPine 10 mg tablet; Commonly known as: Norvasc; Take 1 tablet (10   mg) by mouth once daily.   atenolol 50 mg tablet; Commonly known as: Tenormin; Take 1 tablet (50   mg) by mouth once daily.   fish oil concentrate 120-180 mg capsule; Commonly known as: Omega-3   losartan 25 mg tablet; Commonly known as: Cozaar; Take 1 tablet (25 mg)   by mouth once daily.   metFORMIN 500 mg tablet; Commonly known as: Glucophage; Take 2 tablets   (1,000 mg) by mouth once daily with breakfast.   TylenoL 325 mg capsule; Generic drug: acetaminophen     STOP taking these medications     aspirin 81 mg EC tablet       Test Results Pending At Discharge  Pending Labs       No current pending labs.            Hospital Course   Soni Herrera is a 89 y.o. 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   Initial CT of the head showed acute left anterior cerebral artery infarct with no evidence of hemorrhage.  The ER provider spoke to neurology at [facility] who did not recommend any acute interventions.  The patient followed up with a MRI of the brain which revealed a subacute ischemic infarct in the left anterior cerebral artery territory along with left parasagittal frontal lobe associated with mild petechiae cortical hemorrhage.  MRA of the head and neck revealed an absent right V2/V3 and proximal V4 vertebral arteries however it is felt these are chronic given the chronic infarct in the right cerebellar hemisphere.  During her hospitalization she had another episode where she had increasing weakness of her right upper extremity.  A stat CT was performed which revealed a stable small acute to subacute nonhemorrhagic infarct.  This was discussed with neurology who advised to transition patient aspirin to Plavix and continue Lipitor.  Patient is also advised to follow-up with cardiology for outpatient telemetry monitoring.  Patient was noted to leukocytosis overnight she was treated with pneumonia for Unasyn with concerns of aspiration pneumonia.  Speech therapy was consulted and found no concerns for aspiration or dysphagia.  She is also found to have a E. coli urinary tract infection for which she completed treatment during the hospitalization.  She also reported to 5 loose stools on December 16.  This led to C. difficile PCR which was negative.  Incidental finding of a chronic L4 deformity on CAT scan which patient is advised to follow-up with her PCP  Initially blood pressure medications were held due to permissive hypertension but restarted prior to discharge.  Hospitalization hemoglobin A1c was drawn and found to be 7.6.  On occasion of metformin was continued at discharge.    Greater than 30 minutes were spent in evaluating chart, evaluating patient, discussing discharge plan with care  coordinator and patient.    Pertinent Physical Exam At Time of Discharge  Physical Exam  General: Patient is alert. NAD.       HEENT: Clear sclera.  CVS: RRR.  Lungs: CTAB.   Abdomen: Soft.  NT. +BS.      Extremities: No pitting edema bilateral ankles.   Neurologic: Cranial nerves II through XII are intact, mild weakness in right lower extremity, retained sensation throughout  Psychiatric: Cooperative.  Outpatient Follow-Up  Future Appointments   Date Time Provider Department Center   12/30/2024 11:00 AM GEAUGA HOLTER/ECG [facility] GEANIC1 [place] [facility]   2/11/2025 11:30 AM JEAN Turpin-CNP GEACR1 [facility]   2/13/2025 11:00 AM GEA MRI 1 GEAMRI [place] [facility]   2/21/2025  3:00 PM Josie Means MD [id number] [facility]         Lorenzo Das DO

## 2024-12-18 NOTE — CARE PLAN
The patient's goals for the shift include to go home    The clinical goals for the shift include pt will show no neurological changes during this shift      Problem: General Stroke  Goal: Demonstrate improvement in neurological exam throughout the shift  Outcome: Progressing  Goal: Participate in treatment (ie., meds, therapy) throughout shift  Outcome: Progressing  Goal: No symptoms of aspiration throughout shift  Outcome: Progressing  Goal: No symptoms of hemorrhage throughout shift  Outcome: Progressing  Goal: Controlled blood glucose throughout shift  Outcome: Progressing     Problem: Skin  Goal: Prevent/manage excess moisture  Outcome: Progressing  Goal: Prevent/minimize sheer/friction injuries  Outcome: Progressing

## 2024-12-18 NOTE — PROGRESS NOTES
Soni Herrera is a 89 y.o. female on day 7 of admission presenting with Stroke with cerebral ischemia (Multi).    Subjective   Interval History: no fever, no new complaints        Review of Systems    Objective   Range of Vitals (last 24 hours)  Heart Rate:  [60-69]   Temp:  [36.3 °C (97.3 °F)-37.6 °C (99.7 °F)]   Resp:  [16-22]   BP: (105-152)/(60-69)   SpO2:  [92 %-96 %]   Daily Weight  12/11/24 : 71.6 kg (157 lb 13.6 oz)    Body mass index is 28.87 kg/m².    Physical Exam  Constitutional:       Appearance: Normal appearance.   HENT:      Head: Normocephalic and atraumatic.      Mouth/Throat:      Mouth: Mucous membranes are moist.      Pharynx: Oropharynx is clear.   Eyes:      Pupils: Pupils are equal, round, and reactive to light.   Cardiovascular:      Rate and Rhythm: Normal rate and regular rhythm.      Heart sounds: Normal heart sounds.   Pulmonary:      Effort: Pulmonary effort is normal.      Breath sounds: Normal breath sounds.   Abdominal:      General: Abdomen is flat. Bowel sounds are normal.      Palpations: Abdomen is soft.   Musculoskeletal:      Cervical back: Normal range of motion.   Skin:     Comments: rash   Neurological:      Mental Status: She is alert.         Antibiotics  ampicillin-sulbactam - 3 gram/100 mL    Relevant Results  Labs  Results from last 72 hours   Lab Units 12/17/24  0617 12/16/24  0648   WBC AUTO x10*3/uL 18.4* 15.9*   HEMOGLOBIN g/dL 12.0 11.0*   HEMATOCRIT % 37.6 33.4*   PLATELETS AUTO x10*3/uL 380 370   NEUTROS PCT AUTO % 80.0  --    LYMPHS PCT AUTO % 8.3  --    MONOS PCT AUTO % 6.0  --    EOS PCT AUTO % 3.8  --      Results from last 72 hours   Lab Units 12/16/24  1435 12/16/24  0648   SODIUM mmol/L 137 138   POTASSIUM mmol/L 3.6 3.8   CHLORIDE mmol/L 102 103   CO2 mmol/L 25 24   BUN mg/dL 19 18   CREATININE mg/dL 0.78 0.67   GLUCOSE mg/dL 248* 210*   CALCIUM mg/dL 7.9* 8.1*   ANION GAP mmol/L 14 15   EGFR mL/min/1.73m*2 73 84   PHOSPHORUS mg/dL 3.4 3.9     Results from  "last 72 hours   Lab Units 12/16/24  1435 12/16/24  0648   ALBUMIN g/dL 2.8* 2.7*     Estimated Creatinine Clearance: 46.2 mL/min (by C-G formula based on SCr of 0.78 mg/dL).  No results found for: \"CRP\"  Microbiology  Susceptibility data from last 14 days.  Collected Specimen Info Organism Ampicillin Cefazolin Cefazolin (uncomplicated UTIs only) Ciprofloxacin Gentamicin Nitrofurantoin Piperacillin/Tazobactam Trimethoprim/Sulfamethoxazole   12/11/24 Urine from Straight Catheter Escherichia coli  S  S  S  S  S  S  S  S   Imaging  Reviewed        Assessment/Plan   Possible pneumonia, suspect aspiration  UTI, urine with E. coli  Leukocytosis, fluctuating  Right hip pressure injury  Acquired absence of the spleen  Encephalopathy / CVA  Rash     Recommendations :  Discontinue Unasyn  Discussed with the medical team     I spent minutes in the professional and overall care of this patient.      Chele Watkins MD  "

## 2024-12-19 ENCOUNTER — LAB REQUISITION (OUTPATIENT)
Dept: LAB | Facility: HOSPITAL | Age: 89
End: 2024-12-19
Payer: MEDICARE

## 2024-12-19 DIAGNOSIS — I10 ESSENTIAL (PRIMARY) HYPERTENSION: ICD-10-CM

## 2024-12-19 LAB
ALBUMIN SERPL BCP-MCNC: 2.7 G/DL (ref 3.4–5)
ALP SERPL-CCNC: 60 U/L (ref 33–136)
ALT SERPL W P-5'-P-CCNC: 11 U/L (ref 7–45)
ANION GAP SERPL CALC-SCNC: 15 MMOL/L (ref 10–20)
AST SERPL W P-5'-P-CCNC: 15 U/L (ref 9–39)
BASOPHILS # BLD AUTO: 0.08 X10*3/UL (ref 0–0.1)
BASOPHILS NFR BLD AUTO: 0.4 %
BILIRUB SERPL-MCNC: 0.6 MG/DL (ref 0–1.2)
BUN SERPL-MCNC: 18 MG/DL (ref 6–23)
CALCIUM SERPL-MCNC: 7.7 MG/DL (ref 8.6–10.3)
CHLORIDE SERPL-SCNC: 103 MMOL/L (ref 98–107)
CO2 SERPL-SCNC: 24 MMOL/L (ref 21–32)
CREAT SERPL-MCNC: 0.63 MG/DL (ref 0.5–1.05)
EGFRCR SERPLBLD CKD-EPI 2021: 85 ML/MIN/1.73M*2
EOSINOPHIL # BLD AUTO: 0.96 X10*3/UL (ref 0–0.4)
EOSINOPHIL NFR BLD AUTO: 4.9 %
ERYTHROCYTE [DISTWIDTH] IN BLOOD BY AUTOMATED COUNT: 13.4 % (ref 11.5–14.5)
GLUCOSE SERPL-MCNC: 234 MG/DL (ref 74–99)
HCT VFR BLD AUTO: 35.5 % (ref 36–46)
HGB BLD-MCNC: 11.1 G/DL (ref 12–16)
IMM GRANULOCYTES # BLD AUTO: 0.15 X10*3/UL (ref 0–0.5)
IMM GRANULOCYTES NFR BLD AUTO: 0.8 % (ref 0–0.9)
LYMPHOCYTES # BLD AUTO: 2.61 X10*3/UL (ref 0.8–3)
LYMPHOCYTES NFR BLD AUTO: 13.4 %
MCH RBC QN AUTO: 31.4 PG (ref 26–34)
MCHC RBC AUTO-ENTMCNC: 31.3 G/DL (ref 32–36)
MCV RBC AUTO: 101 FL (ref 80–100)
MONOCYTES # BLD AUTO: 1.06 X10*3/UL (ref 0.05–0.8)
MONOCYTES NFR BLD AUTO: 5.4 %
NEUTROPHILS # BLD AUTO: 14.66 X10*3/UL (ref 1.6–5.5)
NEUTROPHILS NFR BLD AUTO: 75.1 %
NRBC BLD-RTO: 0 /100 WBCS (ref 0–0)
PLATELET # BLD AUTO: 412 X10*3/UL (ref 150–450)
POTASSIUM SERPL-SCNC: 3.9 MMOL/L (ref 3.5–5.3)
PROT SERPL-MCNC: 5.4 G/DL (ref 6.4–8.2)
RBC # BLD AUTO: 3.53 X10*6/UL (ref 4–5.2)
SODIUM SERPL-SCNC: 138 MMOL/L (ref 136–145)
WBC # BLD AUTO: 19.5 X10*3/UL (ref 4.4–11.3)

## 2024-12-19 PROCEDURE — 87086 URINE CULTURE/COLONY COUNT: CPT | Mod: OUT,GEALAB

## 2024-12-19 PROCEDURE — 85025 COMPLETE CBC W/AUTO DIFF WBC: CPT | Mod: OUT | Performed by: EMERGENCY MEDICINE

## 2024-12-19 PROCEDURE — 81001 URINALYSIS AUTO W/SCOPE: CPT | Mod: OUT

## 2024-12-19 PROCEDURE — 3000000012 STAT CHARGE BILL (LAB USE ONLY): Mod: OUT | Performed by: EMERGENCY MEDICINE

## 2024-12-19 PROCEDURE — 80053 COMPREHEN METABOLIC PANEL: CPT | Mod: OUT | Performed by: EMERGENCY MEDICINE

## 2024-12-19 PROCEDURE — 36415 COLL VENOUS BLD VENIPUNCTURE: CPT | Mod: OUT | Performed by: EMERGENCY MEDICINE

## 2024-12-20 ENCOUNTER — LAB REQUISITION (OUTPATIENT)
Dept: LAB | Facility: HOSPITAL | Age: 89
End: 2024-12-20
Payer: MEDICARE

## 2024-12-20 DIAGNOSIS — R30.0 DYSURIA: ICD-10-CM

## 2024-12-20 LAB
APPEARANCE UR: CLEAR
BILIRUB UR STRIP.AUTO-MCNC: NEGATIVE MG/DL
COLOR UR: ABNORMAL
GLUCOSE UR STRIP.AUTO-MCNC: ABNORMAL MG/DL
KETONES UR STRIP.AUTO-MCNC: NEGATIVE MG/DL
LEUKOCYTE ESTERASE UR QL STRIP.AUTO: NEGATIVE
MUCOUS THREADS #/AREA URNS AUTO: NORMAL /LPF
NITRITE UR QL STRIP.AUTO: NEGATIVE
PH UR STRIP.AUTO: 6 [PH]
PROT UR STRIP.AUTO-MCNC: ABNORMAL MG/DL
RBC # UR STRIP.AUTO: NEGATIVE /UL
RBC #/AREA URNS AUTO: NORMAL /HPF
SP GR UR STRIP.AUTO: 1.02
UROBILINOGEN UR STRIP.AUTO-MCNC: NORMAL MG/DL
WBC #/AREA URNS AUTO: NORMAL /HPF

## 2024-12-21 LAB — BACTERIA UR CULT: NO GROWTH

## 2024-12-22 PROCEDURE — 87493 C DIFF AMPLIFIED PROBE: CPT | Mod: OUT,GEALAB | Performed by: EMERGENCY MEDICINE

## 2024-12-23 ENCOUNTER — LAB REQUISITION (OUTPATIENT)
Dept: LAB | Facility: HOSPITAL | Age: 89
End: 2024-12-23
Payer: MEDICARE

## 2024-12-23 DIAGNOSIS — N18.30 CHRONIC KIDNEY DISEASE, STAGE 3 UNSPECIFIED (MULTI): ICD-10-CM

## 2024-12-23 DIAGNOSIS — R19.5 OTHER FECAL ABNORMALITIES: ICD-10-CM

## 2024-12-23 DIAGNOSIS — Z85.71 PERSONAL HISTORY OF HODGKIN LYMPHOMA: ICD-10-CM

## 2024-12-23 DIAGNOSIS — E78.5 HYPERLIPIDEMIA, UNSPECIFIED: ICD-10-CM

## 2024-12-23 DIAGNOSIS — E11.9 TYPE 2 DIABETES MELLITUS WITHOUT COMPLICATIONS (MULTI): ICD-10-CM

## 2024-12-23 LAB
ALBUMIN SERPL BCP-MCNC: 2.9 G/DL (ref 3.4–5)
ALP SERPL-CCNC: 68 U/L (ref 33–136)
ALT SERPL W P-5'-P-CCNC: 8 U/L (ref 7–45)
ANION GAP SERPL CALC-SCNC: 14 MMOL/L (ref 10–20)
AST SERPL W P-5'-P-CCNC: 11 U/L (ref 9–39)
BASOPHILS # BLD AUTO: 0.12 X10*3/UL (ref 0–0.1)
BASOPHILS NFR BLD AUTO: 0.6 %
BILIRUB SERPL-MCNC: 0.5 MG/DL (ref 0–1.2)
BUN SERPL-MCNC: 12 MG/DL (ref 6–23)
CALCIUM SERPL-MCNC: 8.4 MG/DL (ref 8.6–10.3)
CHLORIDE SERPL-SCNC: 103 MMOL/L (ref 98–107)
CO2 SERPL-SCNC: 28 MMOL/L (ref 21–32)
CREAT SERPL-MCNC: 0.54 MG/DL (ref 0.5–1.05)
EGFRCR SERPLBLD CKD-EPI 2021: 89 ML/MIN/1.73M*2
EOSINOPHIL # BLD AUTO: 1.13 X10*3/UL (ref 0–0.4)
EOSINOPHIL NFR BLD AUTO: 6.1 %
ERYTHROCYTE [DISTWIDTH] IN BLOOD BY AUTOMATED COUNT: 13.1 % (ref 11.5–14.5)
GLUCOSE SERPL-MCNC: 179 MG/DL (ref 74–99)
HCT VFR BLD AUTO: 33.9 % (ref 36–46)
HGB BLD-MCNC: 11.3 G/DL (ref 12–16)
IMM GRANULOCYTES # BLD AUTO: 0.11 X10*3/UL (ref 0–0.5)
IMM GRANULOCYTES NFR BLD AUTO: 0.6 % (ref 0–0.9)
LYMPHOCYTES # BLD AUTO: 3.4 X10*3/UL (ref 0.8–3)
LYMPHOCYTES NFR BLD AUTO: 18.3 %
MCH RBC QN AUTO: 32.6 PG (ref 26–34)
MCHC RBC AUTO-ENTMCNC: 33.3 G/DL (ref 32–36)
MCV RBC AUTO: 98 FL (ref 80–100)
MONOCYTES # BLD AUTO: 1.23 X10*3/UL (ref 0.05–0.8)
MONOCYTES NFR BLD AUTO: 6.6 %
NEUTROPHILS # BLD AUTO: 12.64 X10*3/UL (ref 1.6–5.5)
NEUTROPHILS NFR BLD AUTO: 67.8 %
NRBC BLD-RTO: 0 /100 WBCS (ref 0–0)
PLATELET # BLD AUTO: 496 X10*3/UL (ref 150–450)
POTASSIUM SERPL-SCNC: 3.9 MMOL/L (ref 3.5–5.3)
PROT SERPL-MCNC: 5.8 G/DL (ref 6.4–8.2)
RBC # BLD AUTO: 3.47 X10*6/UL (ref 4–5.2)
SODIUM SERPL-SCNC: 141 MMOL/L (ref 136–145)
WBC # BLD AUTO: 18.6 X10*3/UL (ref 4.4–11.3)

## 2024-12-23 PROCEDURE — 36415 COLL VENOUS BLD VENIPUNCTURE: CPT | Mod: OUT | Performed by: EMERGENCY MEDICINE

## 2024-12-23 PROCEDURE — 80053 COMPREHEN METABOLIC PANEL: CPT | Mod: OUT | Performed by: EMERGENCY MEDICINE

## 2024-12-23 PROCEDURE — 85025 COMPLETE CBC W/AUTO DIFF WBC: CPT | Mod: OUT | Performed by: EMERGENCY MEDICINE

## 2024-12-24 LAB — C DIF TOX TCDA+TCDB STL QL NAA+PROBE: NOT DETECTED

## 2024-12-26 ENCOUNTER — LAB REQUISITION (OUTPATIENT)
Dept: LAB | Facility: HOSPITAL | Age: 89
End: 2024-12-26
Payer: MEDICARE

## 2024-12-26 DIAGNOSIS — Z85.71 PERSONAL HISTORY OF HODGKIN LYMPHOMA: ICD-10-CM

## 2024-12-26 LAB
BASOPHILS # BLD AUTO: 0.13 X10*3/UL (ref 0–0.1)
BASOPHILS NFR BLD AUTO: 0.8 %
EOSINOPHIL # BLD AUTO: 1.39 X10*3/UL (ref 0–0.4)
EOSINOPHIL NFR BLD AUTO: 8.9 %
ERYTHROCYTE [DISTWIDTH] IN BLOOD BY AUTOMATED COUNT: 13.3 % (ref 11.5–14.5)
HCT VFR BLD AUTO: 35.7 % (ref 36–46)
HGB BLD-MCNC: 11.5 G/DL (ref 12–16)
IMM GRANULOCYTES # BLD AUTO: 0.09 X10*3/UL (ref 0–0.5)
IMM GRANULOCYTES NFR BLD AUTO: 0.6 % (ref 0–0.9)
LYMPHOCYTES # BLD AUTO: 2.75 X10*3/UL (ref 0.8–3)
LYMPHOCYTES NFR BLD AUTO: 17.5 %
MCH RBC QN AUTO: 31.4 PG (ref 26–34)
MCHC RBC AUTO-ENTMCNC: 32.2 G/DL (ref 32–36)
MCV RBC AUTO: 98 FL (ref 80–100)
MONOCYTES # BLD AUTO: 1.24 X10*3/UL (ref 0.05–0.8)
MONOCYTES NFR BLD AUTO: 7.9 %
NEUTROPHILS # BLD AUTO: 10.09 X10*3/UL (ref 1.6–5.5)
NEUTROPHILS NFR BLD AUTO: 64.3 %
NRBC BLD-RTO: 0 /100 WBCS (ref 0–0)
PLATELET # BLD AUTO: 578 X10*3/UL (ref 150–450)
RBC # BLD AUTO: 3.66 X10*6/UL (ref 4–5.2)
WBC # BLD AUTO: 15.7 X10*3/UL (ref 4.4–11.3)

## 2024-12-26 PROCEDURE — 85025 COMPLETE CBC W/AUTO DIFF WBC: CPT | Mod: OUT | Performed by: EMERGENCY MEDICINE

## 2024-12-26 PROCEDURE — 36415 COLL VENOUS BLD VENIPUNCTURE: CPT | Mod: OUT | Performed by: EMERGENCY MEDICINE

## 2024-12-30 ENCOUNTER — APPOINTMENT (OUTPATIENT)
Dept: CARDIOLOGY | Facility: HOSPITAL | Age: 89
End: 2024-12-30
Payer: MEDICARE

## 2024-12-30 ENCOUNTER — HOSPITAL ENCOUNTER (OUTPATIENT)
Dept: CARDIOLOGY | Facility: HOSPITAL | Age: 89
Setting detail: SPECIMEN
Discharge: HOME | End: 2024-12-30
Payer: MEDICARE

## 2024-12-30 ENCOUNTER — LAB REQUISITION (OUTPATIENT)
Dept: LAB | Facility: HOSPITAL | Age: 89
End: 2024-12-30
Payer: MEDICARE

## 2024-12-30 DIAGNOSIS — Z85.71 PERSONAL HISTORY OF HODGKIN LYMPHOMA: ICD-10-CM

## 2024-12-30 DIAGNOSIS — E11.9 TYPE 2 DIABETES MELLITUS WITHOUT COMPLICATIONS (MULTI): ICD-10-CM

## 2024-12-30 DIAGNOSIS — N18.30 CHRONIC KIDNEY DISEASE, STAGE 3 UNSPECIFIED (MULTI): ICD-10-CM

## 2024-12-30 DIAGNOSIS — I63.522 ACUTE ISCHEMIC LEFT ACA STROKE (MULTI): ICD-10-CM

## 2024-12-30 DIAGNOSIS — I49.8 OTHER CARDIAC ARRHYTHMIA: ICD-10-CM

## 2024-12-30 LAB
ALBUMIN SERPL BCP-MCNC: 3.2 G/DL (ref 3.4–5)
ALP SERPL-CCNC: 66 U/L (ref 33–136)
ALT SERPL W P-5'-P-CCNC: 13 U/L (ref 7–45)
ANION GAP SERPL CALC-SCNC: 14 MMOL/L (ref 10–20)
AST SERPL W P-5'-P-CCNC: 17 U/L (ref 9–39)
BILIRUB SERPL-MCNC: 0.5 MG/DL (ref 0–1.2)
BUN SERPL-MCNC: 21 MG/DL (ref 6–23)
CALCIUM SERPL-MCNC: 8.1 MG/DL (ref 8.6–10.3)
CHLORIDE SERPL-SCNC: 105 MMOL/L (ref 98–107)
CO2 SERPL-SCNC: 25 MMOL/L (ref 21–32)
CREAT SERPL-MCNC: 0.58 MG/DL (ref 0.5–1.05)
EGFRCR SERPLBLD CKD-EPI 2021: 87 ML/MIN/1.73M*2
ERYTHROCYTE [DISTWIDTH] IN BLOOD BY AUTOMATED COUNT: 13.5 % (ref 11.5–14.5)
GLUCOSE SERPL-MCNC: 132 MG/DL (ref 74–99)
HCT VFR BLD AUTO: 35.3 % (ref 36–46)
HGB BLD-MCNC: 11.4 G/DL (ref 12–16)
MCH RBC QN AUTO: 31.5 PG (ref 26–34)
MCHC RBC AUTO-ENTMCNC: 32.3 G/DL (ref 32–36)
MCV RBC AUTO: 98 FL (ref 80–100)
NRBC BLD-RTO: 0 /100 WBCS (ref 0–0)
PLATELET # BLD AUTO: 539 X10*3/UL (ref 150–450)
POTASSIUM SERPL-SCNC: 4.1 MMOL/L (ref 3.5–5.3)
PROT SERPL-MCNC: 6.1 G/DL (ref 6.4–8.2)
RBC # BLD AUTO: 3.62 X10*6/UL (ref 4–5.2)
SODIUM SERPL-SCNC: 140 MMOL/L (ref 136–145)
WBC # BLD AUTO: 14.2 X10*3/UL (ref 4.4–11.3)

## 2024-12-30 PROCEDURE — 85027 COMPLETE CBC AUTOMATED: CPT | Mod: OUT

## 2024-12-30 PROCEDURE — 80053 COMPREHEN METABOLIC PANEL: CPT | Mod: OUT

## 2024-12-30 PROCEDURE — 36415 COLL VENOUS BLD VENIPUNCTURE: CPT | Mod: OUT

## 2025-01-03 ENCOUNTER — APPOINTMENT (OUTPATIENT)
Dept: CARDIOLOGY | Facility: HOSPITAL | Age: 89
End: 2025-01-03
Payer: MEDICARE

## 2025-01-06 ENCOUNTER — LAB REQUISITION (OUTPATIENT)
Dept: LAB | Facility: HOSPITAL | Age: 89
End: 2025-01-06
Payer: MEDICARE

## 2025-01-06 DIAGNOSIS — Z85.71 PERSONAL HISTORY OF HODGKIN LYMPHOMA: ICD-10-CM

## 2025-01-06 DIAGNOSIS — E11.9 TYPE 2 DIABETES MELLITUS WITHOUT COMPLICATIONS (MULTI): ICD-10-CM

## 2025-01-06 DIAGNOSIS — N18.30 CHRONIC KIDNEY DISEASE, STAGE 3 UNSPECIFIED (MULTI): ICD-10-CM

## 2025-01-06 LAB
ALBUMIN SERPL BCP-MCNC: 3.2 G/DL (ref 3.4–5)
ALP SERPL-CCNC: 66 U/L (ref 33–136)
ALT SERPL W P-5'-P-CCNC: 13 U/L (ref 7–45)
ANION GAP SERPL CALC-SCNC: 13 MMOL/L (ref 10–20)
AST SERPL W P-5'-P-CCNC: 17 U/L (ref 9–39)
BILIRUB SERPL-MCNC: 0.7 MG/DL (ref 0–1.2)
BUN SERPL-MCNC: 20 MG/DL (ref 6–23)
CALCIUM SERPL-MCNC: 8 MG/DL (ref 8.6–10.3)
CHLORIDE SERPL-SCNC: 105 MMOL/L (ref 98–107)
CO2 SERPL-SCNC: 25 MMOL/L (ref 21–32)
CREAT SERPL-MCNC: 0.56 MG/DL (ref 0.5–1.05)
EGFRCR SERPLBLD CKD-EPI 2021: 88 ML/MIN/1.73M*2
ERYTHROCYTE [DISTWIDTH] IN BLOOD BY AUTOMATED COUNT: 13.6 % (ref 11.5–14.5)
GLUCOSE SERPL-MCNC: 179 MG/DL (ref 74–99)
HCT VFR BLD AUTO: 37.5 % (ref 36–46)
HGB BLD-MCNC: 12 G/DL (ref 12–16)
MCH RBC QN AUTO: 30.9 PG (ref 26–34)
MCHC RBC AUTO-ENTMCNC: 32 G/DL (ref 32–36)
MCV RBC AUTO: 97 FL (ref 80–100)
NRBC BLD-RTO: 0 /100 WBCS (ref 0–0)
PLATELET # BLD AUTO: 405 X10*3/UL (ref 150–450)
POTASSIUM SERPL-SCNC: 4.1 MMOL/L (ref 3.5–5.3)
PROT SERPL-MCNC: 6 G/DL (ref 6.4–8.2)
RBC # BLD AUTO: 3.88 X10*6/UL (ref 4–5.2)
SODIUM SERPL-SCNC: 139 MMOL/L (ref 136–145)
WBC # BLD AUTO: 12.4 X10*3/UL (ref 4.4–11.3)

## 2025-01-06 PROCEDURE — 36415 COLL VENOUS BLD VENIPUNCTURE: CPT | Mod: OUT

## 2025-01-06 PROCEDURE — 80053 COMPREHEN METABOLIC PANEL: CPT | Mod: OUT

## 2025-01-06 PROCEDURE — 85027 COMPLETE CBC AUTOMATED: CPT | Mod: OUT

## 2025-01-13 ENCOUNTER — LAB REQUISITION (OUTPATIENT)
Dept: LAB | Facility: HOSPITAL | Age: 89
End: 2025-01-13
Payer: MEDICARE

## 2025-01-13 DIAGNOSIS — N18.30 CHRONIC KIDNEY DISEASE, STAGE 3 UNSPECIFIED (MULTI): ICD-10-CM

## 2025-01-13 DIAGNOSIS — Z85.71 PERSONAL HISTORY OF HODGKIN LYMPHOMA: ICD-10-CM

## 2025-01-13 DIAGNOSIS — E11.9 TYPE 2 DIABETES MELLITUS WITHOUT COMPLICATIONS (MULTI): ICD-10-CM

## 2025-01-13 LAB
ALBUMIN SERPL BCP-MCNC: 3.4 G/DL (ref 3.4–5)
ALP SERPL-CCNC: 71 U/L (ref 33–136)
ALT SERPL W P-5'-P-CCNC: 10 U/L (ref 7–45)
ANION GAP SERPL CALC-SCNC: 16 MMOL/L (ref 10–20)
AST SERPL W P-5'-P-CCNC: 16 U/L (ref 9–39)
BILIRUB SERPL-MCNC: 0.9 MG/DL (ref 0–1.2)
BUN SERPL-MCNC: 20 MG/DL (ref 6–23)
CALCIUM SERPL-MCNC: 8.5 MG/DL (ref 8.6–10.3)
CHLORIDE SERPL-SCNC: 104 MMOL/L (ref 98–107)
CO2 SERPL-SCNC: 24 MMOL/L (ref 21–32)
CREAT SERPL-MCNC: 0.56 MG/DL (ref 0.5–1.05)
EGFRCR SERPLBLD CKD-EPI 2021: 88 ML/MIN/1.73M*2
ERYTHROCYTE [DISTWIDTH] IN BLOOD BY AUTOMATED COUNT: 14 % (ref 11.5–14.5)
GLUCOSE SERPL-MCNC: 187 MG/DL (ref 74–99)
HCT VFR BLD AUTO: 40.8 % (ref 36–46)
HGB BLD-MCNC: 12.5 G/DL (ref 12–16)
MCH RBC QN AUTO: 31.2 PG (ref 26–34)
MCHC RBC AUTO-ENTMCNC: 30.6 G/DL (ref 32–36)
MCV RBC AUTO: 102 FL (ref 80–100)
NRBC BLD-RTO: 0 /100 WBCS (ref 0–0)
PLATELET # BLD AUTO: 349 X10*3/UL (ref 150–450)
POTASSIUM SERPL-SCNC: 4.5 MMOL/L (ref 3.5–5.3)
PROT SERPL-MCNC: 6.1 G/DL (ref 6.4–8.2)
RBC # BLD AUTO: 4.01 X10*6/UL (ref 4–5.2)
SODIUM SERPL-SCNC: 139 MMOL/L (ref 136–145)
WBC # BLD AUTO: 13.6 X10*3/UL (ref 4.4–11.3)

## 2025-01-13 PROCEDURE — 80053 COMPREHEN METABOLIC PANEL: CPT | Mod: OUT

## 2025-01-13 PROCEDURE — 85027 COMPLETE CBC AUTOMATED: CPT | Mod: OUT

## 2025-01-13 PROCEDURE — 36415 COLL VENOUS BLD VENIPUNCTURE: CPT | Mod: OUT

## 2025-01-20 ENCOUNTER — LAB REQUISITION (OUTPATIENT)
Dept: LAB | Facility: HOSPITAL | Age: 89
End: 2025-01-20
Payer: MEDICARE

## 2025-01-20 DIAGNOSIS — N18.30 CHRONIC KIDNEY DISEASE, STAGE 3 UNSPECIFIED (MULTI): ICD-10-CM

## 2025-01-20 DIAGNOSIS — E11.9 TYPE 2 DIABETES MELLITUS WITHOUT COMPLICATIONS (MULTI): ICD-10-CM

## 2025-01-20 DIAGNOSIS — Z85.71 PERSONAL HISTORY OF HODGKIN LYMPHOMA: ICD-10-CM

## 2025-01-20 LAB
ALBUMIN SERPL BCP-MCNC: 3 G/DL (ref 3.4–5)
ALP SERPL-CCNC: 72 U/L (ref 33–136)
ALT SERPL W P-5'-P-CCNC: 8 U/L (ref 7–45)
ANION GAP SERPL CALC-SCNC: 14 MMOL/L (ref 10–20)
APPEARANCE UR: ABNORMAL
AST SERPL W P-5'-P-CCNC: 11 U/L (ref 9–39)
BACTERIA #/AREA URNS AUTO: ABNORMAL /HPF
BILIRUB SERPL-MCNC: 1 MG/DL (ref 0–1.2)
BILIRUB UR STRIP.AUTO-MCNC: NEGATIVE MG/DL
BUN SERPL-MCNC: 18 MG/DL (ref 6–23)
CALCIUM SERPL-MCNC: 8.2 MG/DL (ref 8.6–10.3)
CHLORIDE SERPL-SCNC: 106 MMOL/L (ref 98–107)
CO2 SERPL-SCNC: 26 MMOL/L (ref 21–32)
COLOR UR: ABNORMAL
CREAT SERPL-MCNC: 0.51 MG/DL (ref 0.5–1.05)
EGFRCR SERPLBLD CKD-EPI 2021: 90 ML/MIN/1.73M*2
ERYTHROCYTE [DISTWIDTH] IN BLOOD BY AUTOMATED COUNT: 13.8 % (ref 11.5–14.5)
GLUCOSE SERPL-MCNC: 168 MG/DL (ref 74–99)
GLUCOSE UR STRIP.AUTO-MCNC: NORMAL MG/DL
HCT VFR BLD AUTO: 35 % (ref 36–46)
HGB BLD-MCNC: 11.4 G/DL (ref 12–16)
KETONES UR STRIP.AUTO-MCNC: NEGATIVE MG/DL
LEUKOCYTE ESTERASE UR QL STRIP.AUTO: ABNORMAL
MCH RBC QN AUTO: 31.6 PG (ref 26–34)
MCHC RBC AUTO-ENTMCNC: 32.6 G/DL (ref 32–36)
MCV RBC AUTO: 97 FL (ref 80–100)
MUCOUS THREADS #/AREA URNS AUTO: ABNORMAL /LPF
NITRITE UR QL STRIP.AUTO: NEGATIVE
NRBC BLD-RTO: 0 /100 WBCS (ref 0–0)
PH UR STRIP.AUTO: 5.5 [PH]
PLATELET # BLD AUTO: 422 X10*3/UL (ref 150–450)
POTASSIUM SERPL-SCNC: 3.8 MMOL/L (ref 3.5–5.3)
PROT SERPL-MCNC: 5.8 G/DL (ref 6.4–8.2)
PROT UR STRIP.AUTO-MCNC: ABNORMAL MG/DL
RBC # BLD AUTO: 3.61 X10*6/UL (ref 4–5.2)
RBC # UR STRIP.AUTO: ABNORMAL /UL
RBC #/AREA URNS AUTO: >20 /HPF
SODIUM SERPL-SCNC: 142 MMOL/L (ref 136–145)
SP GR UR STRIP.AUTO: 1.02
UROBILINOGEN UR STRIP.AUTO-MCNC: NORMAL MG/DL
WBC # BLD AUTO: 14.9 X10*3/UL (ref 4.4–11.3)
WBC #/AREA URNS AUTO: >50 /HPF
WBC CLUMPS #/AREA URNS AUTO: ABNORMAL /HPF

## 2025-01-20 PROCEDURE — 36415 COLL VENOUS BLD VENIPUNCTURE: CPT | Mod: OUT

## 2025-01-20 PROCEDURE — 84075 ASSAY ALKALINE PHOSPHATASE: CPT | Mod: OUT

## 2025-01-20 PROCEDURE — 85027 COMPLETE CBC AUTOMATED: CPT | Mod: OUT

## 2025-01-20 PROCEDURE — 81001 URINALYSIS AUTO W/SCOPE: CPT | Mod: OUT

## 2025-01-20 PROCEDURE — 87086 URINE CULTURE/COLONY COUNT: CPT | Mod: OUT,GEALAB

## 2025-01-21 LAB — BACTERIA UR CULT: NORMAL

## 2025-02-13 ENCOUNTER — APPOINTMENT (OUTPATIENT)
Dept: PRIMARY CARE | Facility: CLINIC | Age: 89
End: 2025-02-13
Payer: MEDICARE

## 2025-02-13 ENCOUNTER — HOSPITAL ENCOUNTER (OUTPATIENT)
Dept: RADIOLOGY | Facility: HOSPITAL | Age: 89
End: 2025-02-13
Payer: MEDICARE

## 2025-02-13 VITALS
HEART RATE: 71 BPM | WEIGHT: 154 LBS | BODY MASS INDEX: 28.34 KG/M2 | DIASTOLIC BLOOD PRESSURE: 66 MMHG | TEMPERATURE: 96.3 F | OXYGEN SATURATION: 97 % | HEIGHT: 62 IN | SYSTOLIC BLOOD PRESSURE: 138 MMHG

## 2025-02-13 DIAGNOSIS — I10 ESSENTIAL HYPERTENSION: Primary | ICD-10-CM

## 2025-02-13 DIAGNOSIS — E11.9 TYPE 2 DIABETES MELLITUS WITHOUT COMPLICATION, WITHOUT LONG-TERM CURRENT USE OF INSULIN (MULTI): ICD-10-CM

## 2025-02-13 DIAGNOSIS — C83.30 DIFFUSE LARGE B-CELL LYMPHOMA, UNSPECIFIED BODY REGION (MULTI): ICD-10-CM

## 2025-02-13 DIAGNOSIS — S06.35AA: ICD-10-CM

## 2025-02-13 DIAGNOSIS — N18.31 STAGE 3A CHRONIC KIDNEY DISEASE (MULTI): ICD-10-CM

## 2025-02-13 DIAGNOSIS — I63.9 STROKE WITH CEREBRAL ISCHEMIA (MULTI): ICD-10-CM

## 2025-02-13 PROCEDURE — 99495 TRANSJ CARE MGMT MOD F2F 14D: CPT | Performed by: NURSE PRACTITIONER

## 2025-02-13 PROCEDURE — 1126F AMNT PAIN NOTED NONE PRSNT: CPT | Performed by: NURSE PRACTITIONER

## 2025-02-13 PROCEDURE — 1124F ACP DISCUSS-NO DSCNMKR DOCD: CPT | Performed by: NURSE PRACTITIONER

## 2025-02-13 PROCEDURE — 1160F RVW MEDS BY RX/DR IN RCRD: CPT | Performed by: NURSE PRACTITIONER

## 2025-02-13 PROCEDURE — 1159F MED LIST DOCD IN RCRD: CPT | Performed by: NURSE PRACTITIONER

## 2025-02-13 PROCEDURE — 1157F ADVNC CARE PLAN IN RCRD: CPT | Performed by: NURSE PRACTITIONER

## 2025-02-13 PROCEDURE — 3075F SYST BP GE 130 - 139MM HG: CPT | Performed by: NURSE PRACTITIONER

## 2025-02-13 PROCEDURE — 3078F DIAST BP <80 MM HG: CPT | Performed by: NURSE PRACTITIONER

## 2025-02-13 PROCEDURE — 1036F TOBACCO NON-USER: CPT | Performed by: NURSE PRACTITIONER

## 2025-02-13 RX ORDER — AMLODIPINE BESYLATE 5 MG/1
TABLET ORAL
COMMUNITY
Start: 2025-02-08

## 2025-02-13 RX ORDER — FLUTICASONE PROPIONATE 50 MCG
SPRAY, SUSPENSION (ML) NASAL EVERY 24 HOURS
COMMUNITY
Start: 2023-05-30 | End: 2025-02-13 | Stop reason: WASHOUT

## 2025-02-13 RX ORDER — MIRTAZAPINE 7.5 MG/1
TABLET, FILM COATED ORAL
COMMUNITY
Start: 2025-02-08 | End: 2025-02-13 | Stop reason: WASHOUT

## 2025-02-13 RX ORDER — LIDOCAINE 50 MG/G
1 PATCH TOPICAL DAILY
COMMUNITY

## 2025-02-13 RX ORDER — METFORMIN HYDROCHLORIDE 1000 MG/1
TABLET ORAL
COMMUNITY
Start: 2025-02-08

## 2025-02-13 ASSESSMENT — ENCOUNTER SYMPTOMS
EYE DISCHARGE: 0
BRUISES/BLEEDS EASILY: 0
DIARRHEA: 0
SHORTNESS OF BREATH: 0
ARTHRALGIAS: 0
JOINT SWELLING: 0
BACK PAIN: 0
ADENOPATHY: 0
PALPITATIONS: 0
COUGH: 0
WHEEZING: 0
SORE THROAT: 0
NERVOUS/ANXIOUS: 0
PHOTOPHOBIA: 0
LOSS OF SENSATION IN FEET: 0
DEPRESSION: 0
APPETITE CHANGE: 0
TREMORS: 0
DIZZINESS: 0
OCCASIONAL FEELINGS OF UNSTEADINESS: 1
SINUS PAIN: 0
BLOOD IN STOOL: 0
AGITATION: 0
CONSTIPATION: 0
ABDOMINAL PAIN: 0
HEMATURIA: 0
WEAKNESS: 1
HEADACHES: 0
DYSURIA: 0
ACTIVITY CHANGE: 0

## 2025-02-13 ASSESSMENT — LIFESTYLE VARIABLES: HOW MANY STANDARD DRINKS CONTAINING ALCOHOL DO YOU HAVE ON A TYPICAL DAY: PATIENT DOES NOT DRINK

## 2025-02-13 ASSESSMENT — PAIN SCALES - GENERAL: PAINLEVEL_OUTOF10: 0-NO PAIN

## 2025-02-13 NOTE — PROGRESS NOTES
"Patient was identified as a fall risk. Risk prevention instructions provided.  Patient: Soni Herrera  : 1935  PCP: MEGHANN Alcocer  MRN: [id number]  Program: Transitional Care Management  Status: Enrolled  Effective Dates: 2025 - present  Responsible Staff: Yara Abel  Social Drivers to be Addressed: No information to display      Soni Herrera is a 89 y.o. female presenting today for follow-up after being discharged from the hospital 4 days ago. The main problem requiring admission was Cerebral Infarction. The discharge summary and/or Transitional Care Management documentation was reviewed. Medication reconciliation was performed as indicated via the \"Allan as Reviewed\" timestamp.     Soni Herrera was contacted by Transitional Care Management services two days after her discharge. This encounter and supporting documentation was reviewed.  Presents today with son and daughter in law for post hospital follow up.  She reports that she was found down at home by her [facility] helper.  She had apparently been on the floor for two days before the helper arrived.  She went to the hospital and was diagnosed with a cerebral infarction.  She was also diagnosed with a urinary tract infection.  She was hospitalized for 1 week and subsequently transferred to [facility].  She received therapy while there.  She reports she continues to have some right sided weakness and randolph have some difficulty going from seated to standing.  She reports once standing she is able to ambulate with the use of a walker.  She is able to independently dress but does need some stand by assistance for balance and standing.  She reports her daughter in law will be managing her medications to help with her medication compliance.  She will be receiving home physical therapy and evaluated for other home care needs  She will be living with her son and daughter in law in [facility]. She will establish care with a provider in that " "area.  The plan will be for her to remain with them.  They are not planning on her returning home independently.    Review of Systems   Constitutional:  Negative for activity change and appetite change.   HENT:  Negative for congestion, ear pain, hearing loss, sinus pain and sore throat.    Eyes:  Negative for photophobia, discharge and visual disturbance.   Respiratory:  Negative for cough, shortness of breath and wheezing.    Cardiovascular:  Negative for chest pain, palpitations and leg swelling.   Gastrointestinal:  Negative for abdominal pain, blood in stool, constipation and diarrhea.   Endocrine: Negative for cold intolerance, heat intolerance and polyuria.   Genitourinary:  Negative for dysuria and hematuria.   Musculoskeletal:  Negative for arthralgias, back pain and joint swelling.   Skin:  Negative for rash.   Allergic/Immunologic: Negative for environmental allergies and food allergies.   Neurological:  Positive for weakness. Negative for dizziness, tremors, syncope and headaches.   Hematological:  Negative for adenopathy. Does not bruise/bleed easily.   Psychiatric/Behavioral:  Negative for agitation and suicidal ideas. The patient is not nervous/anxious.        /66   Pulse 71   Temp 35.7 °C (96.3 °F) (Temporal)   Ht 1.575 m (5' 2\")   Wt 69.9 kg (154 lb) Comment: per patient  SpO2 97%   BMI 28.17 kg/m²     Physical Exam  Vitals reviewed.   Constitutional:       General: She is not in acute distress.     Appearance: Normal appearance.   HENT:      Head: Normocephalic.      Right Ear: Tympanic membrane normal.      Left Ear: Tympanic membrane normal.      Nose: Nose normal.      Mouth/Throat:      Mouth: Mucous membranes are moist.   Eyes:      Conjunctiva/sclera: Conjunctivae normal.      Pupils: Pupils are equal, round, and reactive to light.   Cardiovascular:      Rate and Rhythm: Normal rate and regular rhythm.      Pulses: Normal pulses.      Heart sounds: Normal heart sounds. "   Pulmonary:      Effort: Pulmonary effort is normal.      Breath sounds: Normal breath sounds.   Abdominal:      General: Bowel sounds are normal.      Palpations: Abdomen is soft.   Musculoskeletal:         General: Normal range of motion.   Skin:     General: Skin is warm and dry.      Capillary Refill: Capillary refill takes less than 2 seconds.   Neurological:      Mental Status: She is alert and oriented to person, place, and time.      Motor: Weakness present. No seizure activity.      Coordination: Coordination is intact.      Comments: Right arm weaker than left   Psychiatric:         Mood and Affect: Mood normal.         Speech: Speech normal.         The complexity of medical decision making for this patient's transitional care is moderate.    Assessment/Plan   Problem List Items Addressed This Visit             ICD-10-CM    Type 2 diabetes mellitus without complications (Multi) E11.9    Relevant Orders    Follow Up In Primary Care - Established    Essential hypertension - Primary I10    Stage 3a chronic kidney disease (Multi) N18.31    Stroke with cerebral ischemia (Multi) I63.9    Diffuse large B-cell lymphoma, unspecified body region (Multi) C83.30    Traumatic hemorrhage of left cerebrum with loss of consciousness status unknown, initial encounter (Multi) S06.35AA   Keep appointment For MRI   Follow up with Oncology  Avoid high carbohydrate high sugar foods and drinks

## 2025-02-13 NOTE — PATIENT INSTRUCTIONS
Keep appointment For MRI   Follow up with Oncology  Avoid high carbohydrate high sugar foods and drinks        Ways to Help Prevent Falls at Home    Quick Tips   ? Ask for help if you need it. Most people want to help!   ? Get up slowly after sitting or laying down   ? Wear a medical alert device or keep cell phone in your pocket   ? Use night lights, especially areas near a bathroom   ? Keep the items you use often within reach on a small stool or end table   ? Use an assistive device such as walker or cane, as directed by provider/physical therapy   ? Use a non-slip mat and grab bars in your bathroom. Look for home health sections for best options     Other Areas to Focus On   ? Exercise and nutrition: Regular exercise or taking a falls prevention class are great ways improve strength and balance. Don’t forget to stay hydrated and bring a snack!   ? Medicine side effects: Some medicines can make you sleepy or dizzy, which could cause a fall. Ask your healthcare provider about the side effects your medicines could cause. Be sure to let them know if you take any vitamins or supplements as well.   ? Tripping hazards: Remove items you could trip on, such as loose mats, rugs, cords, and clutter. Wear closed toe shoes with rubber soles.   ? Health and wellness: Get regular checkups with your healthcare provider, plus routine vision and hearing screenings. Talk with your healthcare provider about:   o Your medicines and the possible side effects - bring them in a bag if that is easier!   o Problems with balance or feeling dizzy   o Ways to promote bone health, such as Vitamin D and calcium supplements   o Questions or concerns about falling     *Ask your healthcare team if you have questions     ©[facility], 2022